# Patient Record
Sex: MALE | Race: WHITE | NOT HISPANIC OR LATINO | Employment: OTHER | URBAN - METROPOLITAN AREA
[De-identification: names, ages, dates, MRNs, and addresses within clinical notes are randomized per-mention and may not be internally consistent; named-entity substitution may affect disease eponyms.]

---

## 2017-02-11 ENCOUNTER — RESULTS ENCOUNTER (OUTPATIENT)
Dept: FAMILY MEDICINE CLINIC | Facility: CLINIC | Age: 82
End: 2017-02-11

## 2017-02-11 DIAGNOSIS — I10 ESSENTIAL HYPERTENSION: ICD-10-CM

## 2017-02-11 DIAGNOSIS — E78.5 HYPERLIPIDEMIA: ICD-10-CM

## 2017-02-11 DIAGNOSIS — R79.89 ELEVATED TSH: ICD-10-CM

## 2017-03-14 LAB
ALBUMIN SERPL-MCNC: 4 G/DL (ref 3.5–5.2)
ALBUMIN/GLOB SERPL: 1.5 G/DL
ALP SERPL-CCNC: 58 U/L (ref 39–117)
ALT SERPL-CCNC: 17 U/L (ref 1–41)
AST SERPL-CCNC: 16 U/L (ref 1–40)
BASOPHILS # BLD AUTO: 0.08 10*3/MM3 (ref 0–0.2)
BASOPHILS NFR BLD AUTO: 1.1 % (ref 0–1.5)
BILIRUB SERPL-MCNC: 0.7 MG/DL (ref 0.1–1.2)
BUN SERPL-MCNC: 16 MG/DL (ref 8–23)
BUN/CREAT SERPL: 15.4 (ref 7–25)
CALCIUM SERPL-MCNC: 9.8 MG/DL (ref 8.6–10.5)
CHLORIDE SERPL-SCNC: 103 MMOL/L (ref 98–107)
CHOLEST SERPL-MCNC: 175 MG/DL (ref 0–200)
CO2 SERPL-SCNC: 25.6 MMOL/L (ref 22–29)
CREAT SERPL-MCNC: 1.04 MG/DL (ref 0.76–1.27)
EOSINOPHIL # BLD AUTO: 0.48 10*3/MM3 (ref 0–0.7)
EOSINOPHIL NFR BLD AUTO: 6.7 % (ref 0.3–6.2)
ERYTHROCYTE [DISTWIDTH] IN BLOOD BY AUTOMATED COUNT: 14.1 % (ref 11.5–14.5)
GLOBULIN SER CALC-MCNC: 2.6 GM/DL
GLUCOSE SERPL-MCNC: 95 MG/DL (ref 65–99)
HCT VFR BLD AUTO: 42.7 % (ref 40.4–52.2)
HDLC SERPL-MCNC: 49 MG/DL (ref 40–60)
HGB BLD-MCNC: 13.9 G/DL (ref 13.7–17.6)
IMM GRANULOCYTES # BLD: 0.02 10*3/MM3 (ref 0–0.03)
IMM GRANULOCYTES NFR BLD: 0.3 % (ref 0–0.5)
LDLC SERPL CALC-MCNC: 92 MG/DL (ref 0–100)
LDLC/HDLC SERPL: 1.88 {RATIO}
LYMPHOCYTES # BLD AUTO: 2.08 10*3/MM3 (ref 0.9–4.8)
LYMPHOCYTES NFR BLD AUTO: 28.9 % (ref 19.6–45.3)
MCH RBC QN AUTO: 31.4 PG (ref 27–32.7)
MCHC RBC AUTO-ENTMCNC: 32.6 G/DL (ref 32.6–36.4)
MCV RBC AUTO: 96.6 FL (ref 79.8–96.2)
MONOCYTES # BLD AUTO: 0.6 10*3/MM3 (ref 0.2–1.2)
MONOCYTES NFR BLD AUTO: 8.3 % (ref 5–12)
NEUTROPHILS # BLD AUTO: 3.94 10*3/MM3 (ref 1.9–8.1)
NEUTROPHILS NFR BLD AUTO: 54.7 % (ref 42.7–76)
PLATELET # BLD AUTO: 258 10*3/MM3 (ref 140–500)
POTASSIUM SERPL-SCNC: 4.9 MMOL/L (ref 3.5–5.2)
PROT SERPL-MCNC: 6.6 G/DL (ref 6–8.5)
RBC # BLD AUTO: 4.42 10*6/MM3 (ref 4.6–6)
SODIUM SERPL-SCNC: 141 MMOL/L (ref 136–145)
TRIGL SERPL-MCNC: 170 MG/DL (ref 0–150)
TSH SERPL DL<=0.005 MIU/L-ACNC: 5.54 MIU/ML (ref 0.27–4.2)
VLDLC SERPL CALC-MCNC: 34 MG/DL (ref 5–40)
WBC # BLD AUTO: 7.2 10*3/MM3 (ref 4.5–10.7)

## 2017-03-30 ENCOUNTER — OFFICE VISIT (OUTPATIENT)
Dept: FAMILY MEDICINE CLINIC | Facility: CLINIC | Age: 82
End: 2017-03-30

## 2017-03-30 VITALS
DIASTOLIC BLOOD PRESSURE: 77 MMHG | WEIGHT: 178 LBS | HEART RATE: 59 BPM | RESPIRATION RATE: 16 BRPM | HEIGHT: 69 IN | SYSTOLIC BLOOD PRESSURE: 130 MMHG | TEMPERATURE: 98 F | BODY MASS INDEX: 26.36 KG/M2

## 2017-03-30 DIAGNOSIS — R79.89 ELEVATED TSH: ICD-10-CM

## 2017-03-30 DIAGNOSIS — I10 ESSENTIAL HYPERTENSION: Primary | ICD-10-CM

## 2017-03-30 DIAGNOSIS — E78.49 OTHER HYPERLIPIDEMIA: ICD-10-CM

## 2017-03-30 PROCEDURE — 99214 OFFICE O/P EST MOD 30 MIN: CPT | Performed by: FAMILY MEDICINE

## 2017-03-30 RX ORDER — ATORVASTATIN CALCIUM 10 MG/1
10 TABLET, FILM COATED ORAL NIGHTLY
Qty: 90 TABLET | Refills: 1 | Status: SHIPPED | OUTPATIENT
Start: 2017-03-30 | End: 2017-09-20 | Stop reason: SDUPTHER

## 2017-03-30 RX ORDER — LISINOPRIL 20 MG/1
20 TABLET ORAL DAILY
Qty: 90 TABLET | Refills: 1 | Status: SHIPPED | OUTPATIENT
Start: 2017-03-30 | End: 2017-09-20 | Stop reason: SDUPTHER

## 2017-03-30 NOTE — PROGRESS NOTES
"Chief Complaint   Patient presents with   • Hypertension   • Hyperlipidemia       Subjective   This patient presents the office to review labs.  His blood pressure is controlled on current therapy.  Lipids are stable.  He continues to have elevated thyroid-stimulating hormone.  We discussed the mechanism of this abnormal lab during today's office visit.  His previous neck pain has resolved.    Review of Systems   Constitutional: Negative for fatigue.   Cardiovascular: Negative for chest pain.       Objective   /77  Pulse 59  Temp 98 °F (36.7 °C) (Oral)   Resp 16  Ht 68.5\" (174 cm)  Wt 178 lb (80.7 kg)  BMI 26.67 kg/m2  Body mass index is 26.67 kg/(m^2).  Physical Exam   Constitutional: He is cooperative. No distress.   Eyes: Conjunctivae and lids are normal.   Neck: Carotid bruit is not present. No tracheal deviation present.   Cardiovascular: Normal rate, regular rhythm and normal heart sounds.    No murmur heard.  Pulmonary/Chest: Effort normal and breath sounds normal.   Neurological: He is alert. He is not disoriented.   Skin: Skin is warm and dry.   Psychiatric: He has a normal mood and affect. His speech is normal and behavior is normal.   Vitals reviewed.      Assessment/Plan     Problem List Items Addressed This Visit        Cardiovascular and Mediastinum    Hyperlipidemia    Relevant Medications    atorvastatin (LIPITOR) 10 MG tablet    Other Relevant Orders    Lipid Panel With LDL/HDL Ratio    Essential hypertension - Primary    Relevant Medications    lisinopril (PRINIVIL,ZESTRIL) 20 MG tablet    Other Relevant Orders    Comprehensive metabolic panel    CBC and Differential       Other    Elevated TSH    Relevant Orders    TSH    T4, Free    T3, Free          Outpatient Encounter Prescriptions as of 3/30/2017   Medication Sig Dispense Refill   • acetaminophen (TYLENOL) 500 MG tablet Take 2 tablets po every 8 hours as needed for pain not to exceed 6 tablets/24 hours 180 tablet 11   • Vitamin D, " Cholecalciferol, 1000 UNITS capsule Take  by mouth.     • atorvastatin (LIPITOR) 10 MG tablet Take 1 tablet by mouth Every Night for 180 days. 90 tablet 1   • lisinopril (PRINIVIL,ZESTRIL) 20 MG tablet Take 1 tablet by mouth Daily for 180 days. 90 tablet 1   • [DISCONTINUED] atorvastatin (LIPITOR) 10 MG tablet Take 1 tablet by mouth every night for 180 days. 90 tablet 1   • [DISCONTINUED] lisinopril (PRINIVIL,ZESTRIL) 20 MG tablet Take 1 tablet by mouth daily for 180 days. 90 tablet 1     No facility-administered encounter medications on file as of 3/30/2017.        Orders Placed This Encounter   Procedures   • Comprehensive metabolic panel     Standing Status:   Future     Standing Expiration Date:   12/25/2017   • Lipid Panel With LDL/HDL Ratio     Standing Status:   Future     Standing Expiration Date:   12/25/2017   • TSH     Standing Status:   Future     Standing Expiration Date:   12/25/2017   • T4, Free     Standing Status:   Future     Standing Expiration Date:   12/25/2017   • T3, Free     Standing Status:   Future     Standing Expiration Date:   12/25/2017       Continue with current treatment plan.         F/U in 6 months

## 2017-08-27 ENCOUNTER — RESULTS ENCOUNTER (OUTPATIENT)
Dept: FAMILY MEDICINE CLINIC | Facility: CLINIC | Age: 82
End: 2017-08-27

## 2017-08-27 DIAGNOSIS — I10 ESSENTIAL HYPERTENSION: ICD-10-CM

## 2017-08-27 DIAGNOSIS — E78.49 OTHER HYPERLIPIDEMIA: ICD-10-CM

## 2017-08-27 DIAGNOSIS — R79.89 ELEVATED TSH: ICD-10-CM

## 2017-09-14 LAB
ALBUMIN SERPL-MCNC: 4.3 G/DL (ref 3.5–5.2)
ALBUMIN/GLOB SERPL: 1.6 G/DL
ALP SERPL-CCNC: 61 U/L (ref 39–117)
ALT SERPL-CCNC: 18 U/L (ref 1–41)
AST SERPL-CCNC: 20 U/L (ref 1–40)
BASOPHILS # BLD AUTO: 0.1 10*3/MM3 (ref 0–0.2)
BASOPHILS NFR BLD AUTO: 1.4 % (ref 0–1.5)
BILIRUB SERPL-MCNC: 0.8 MG/DL (ref 0.1–1.2)
BUN SERPL-MCNC: 22 MG/DL (ref 8–23)
BUN/CREAT SERPL: 17.3 (ref 7–25)
CALCIUM SERPL-MCNC: 10 MG/DL (ref 8.6–10.5)
CHLORIDE SERPL-SCNC: 101 MMOL/L (ref 98–107)
CHOLEST SERPL-MCNC: 164 MG/DL (ref 0–200)
CO2 SERPL-SCNC: 25.3 MMOL/L (ref 22–29)
CREAT SERPL-MCNC: 1.27 MG/DL (ref 0.76–1.27)
EOSINOPHIL # BLD AUTO: 0.53 10*3/MM3 (ref 0–0.7)
EOSINOPHIL NFR BLD AUTO: 7.5 % (ref 0.3–6.2)
ERYTHROCYTE [DISTWIDTH] IN BLOOD BY AUTOMATED COUNT: 14.4 % (ref 11.5–14.5)
GLOBULIN SER CALC-MCNC: 2.7 GM/DL
GLUCOSE SERPL-MCNC: 96 MG/DL (ref 65–99)
HCT VFR BLD AUTO: 42.9 % (ref 40.4–52.2)
HDLC SERPL-MCNC: 55 MG/DL (ref 40–60)
HGB BLD-MCNC: 14.2 G/DL (ref 13.7–17.6)
IMM GRANULOCYTES # BLD: 0 10*3/MM3 (ref 0–0.03)
IMM GRANULOCYTES NFR BLD: 0 % (ref 0–0.5)
LDLC SERPL CALC-MCNC: 84 MG/DL (ref 0–100)
LDLC/HDLC SERPL: 1.53 {RATIO}
LYMPHOCYTES # BLD AUTO: 1.96 10*3/MM3 (ref 0.9–4.8)
LYMPHOCYTES NFR BLD AUTO: 27.9 % (ref 19.6–45.3)
MCH RBC QN AUTO: 32.4 PG (ref 27–32.7)
MCHC RBC AUTO-ENTMCNC: 33.1 G/DL (ref 32.6–36.4)
MCV RBC AUTO: 97.9 FL (ref 79.8–96.2)
MONOCYTES # BLD AUTO: 0.63 10*3/MM3 (ref 0.2–1.2)
MONOCYTES NFR BLD AUTO: 9 % (ref 5–12)
NEUTROPHILS # BLD AUTO: 3.8 10*3/MM3 (ref 1.9–8.1)
NEUTROPHILS NFR BLD AUTO: 54.2 % (ref 42.7–76)
PLATELET # BLD AUTO: 291 10*3/MM3 (ref 140–500)
POTASSIUM SERPL-SCNC: 4.9 MMOL/L (ref 3.5–5.2)
PROT SERPL-MCNC: 7 G/DL (ref 6–8.5)
RBC # BLD AUTO: 4.38 10*6/MM3 (ref 4.6–6)
SODIUM SERPL-SCNC: 141 MMOL/L (ref 136–145)
T3FREE SERPL-MCNC: 2.7 PG/ML (ref 2–4.4)
T4 FREE SERPL-MCNC: 0.94 NG/DL (ref 0.93–1.7)
TRIGL SERPL-MCNC: 123 MG/DL (ref 0–150)
TSH SERPL DL<=0.005 MIU/L-ACNC: 8.06 MIU/ML (ref 0.27–4.2)
VLDLC SERPL CALC-MCNC: 24.6 MG/DL (ref 5–40)
WBC # BLD AUTO: 7.02 10*3/MM3 (ref 4.5–10.7)

## 2017-09-20 ENCOUNTER — OFFICE VISIT (OUTPATIENT)
Dept: FAMILY MEDICINE CLINIC | Facility: CLINIC | Age: 82
End: 2017-09-20

## 2017-09-20 VITALS
HEIGHT: 69 IN | TEMPERATURE: 97.4 F | WEIGHT: 174 LBS | SYSTOLIC BLOOD PRESSURE: 124 MMHG | HEART RATE: 64 BPM | RESPIRATION RATE: 16 BRPM | DIASTOLIC BLOOD PRESSURE: 70 MMHG | BODY MASS INDEX: 25.77 KG/M2

## 2017-09-20 DIAGNOSIS — R79.89 ELEVATED TSH: ICD-10-CM

## 2017-09-20 DIAGNOSIS — I10 ESSENTIAL HYPERTENSION: Primary | ICD-10-CM

## 2017-09-20 DIAGNOSIS — E78.49 OTHER HYPERLIPIDEMIA: ICD-10-CM

## 2017-09-20 PROCEDURE — 99214 OFFICE O/P EST MOD 30 MIN: CPT | Performed by: FAMILY MEDICINE

## 2017-09-20 RX ORDER — ATORVASTATIN CALCIUM 10 MG/1
10 TABLET, FILM COATED ORAL NIGHTLY
Qty: 90 TABLET | Refills: 1 | Status: SHIPPED | OUTPATIENT
Start: 2017-09-20 | End: 2018-04-02 | Stop reason: SDUPTHER

## 2017-09-20 RX ORDER — LISINOPRIL 20 MG/1
20 TABLET ORAL DAILY
Qty: 90 TABLET | Refills: 1 | Status: SHIPPED | OUTPATIENT
Start: 2017-09-20 | End: 2018-04-02 | Stop reason: SDUPTHER

## 2017-09-20 NOTE — PROGRESS NOTES
"Chief Complaint   Patient presents with   • Hypertension   • Hyperlipidemia       Subjective   This patient presents the office to review labs and refill medications.  Blood pressure is to goal on current therapy.  Lipids are stable.  He continues to have mildly progressive elevation of TSH.  His thyroid hormones are within normal limits.  He has no symptoms other than mild swelling of the ankles due to his thyroid.  I have reviewed and updated his medications, medical history and problem list during today's office visit.        Social History   Substance Use Topics   • Smoking status: Former Smoker   • Smokeless tobacco: Not on file   • Alcohol use Yes       Review of Systems    Objective   /70  Pulse 64  Temp 97.4 °F (36.3 °C) (Oral)   Resp 16  Ht 68.5\" (174 cm)  Wt 174 lb (78.9 kg)  BMI 26.07 kg/m2  Body mass index is 26.07 kg/(m^2).  Physical Exam    Data Reviewed:    CMP:  Lab Results   Component Value Date    GLU 96 09/13/2017    BUN 22 09/13/2017    CREATININE 1.27 09/13/2017    EGFRIFNONA 54 (L) 09/13/2017    EGFRIFAFRI 66 09/13/2017     09/13/2017    K 4.9 09/13/2017     09/13/2017    CALCIUM 10.0 09/13/2017    PROTENTOTREF 7.0 09/13/2017    ALBUMIN 4.30 09/13/2017    LABGLOBREF 2.7 09/13/2017    BILITOT 0.8 09/13/2017    ALKPHOS 61 09/13/2017    AST 20 09/13/2017    ALT 18 09/13/2017     CBC w/ diff:   Lab Results   Component Value Date    WBC 7.02 09/13/2017    RBC 4.38 (L) 09/13/2017    HGB 14.2 09/13/2017    HCT 42.9 09/13/2017    MCV 97.9 (H) 09/13/2017    MCH 32.4 09/13/2017    MCHC 33.1 09/13/2017    RDW 14.4 09/13/2017     09/13/2017    NEUTRORELPCT 54.2 09/13/2017    LYMPHORELPCT 27.9 09/13/2017    MONORELPCT 9.0 09/13/2017    EOSRELPCT 7.5 (H) 09/13/2017    BASORELPCT 1.4 09/13/2017     LIPID PANEL:  Lab Results   Component Value Date    CHLPL 164 09/13/2017    TRIG 123 09/13/2017    HDL 55 09/13/2017    VLDL 24.6 09/13/2017    LDL 84 09/13/2017    LDLHDL 1.53 " 09/13/2017     TSH:  Lab Results   Component Value Date    TSH 8.060 (H) 09/13/2017       Assessment/Plan     Problem List Items Addressed This Visit        Cardiovascular and Mediastinum    Hyperlipidemia    Relevant Medications    atorvastatin (LIPITOR) 10 MG tablet    Other Relevant Orders    Lipid Panel With LDL/HDL Ratio    Essential hypertension - Primary    Relevant Medications    lisinopril (PRINIVIL,ZESTRIL) 20 MG tablet    Other Relevant Orders    Comprehensive metabolic panel    CBC and Differential       Other    Elevated TSH    Relevant Orders    TSH          Outpatient Encounter Prescriptions as of 9/20/2017   Medication Sig Dispense Refill   • atorvastatin (LIPITOR) 10 MG tablet Take 1 tablet by mouth Every Night for 180 days. 90 tablet 1   • lisinopril (PRINIVIL,ZESTRIL) 20 MG tablet Take 1 tablet by mouth Daily for 180 days. 90 tablet 1   • Vitamin D, Cholecalciferol, 1000 UNITS capsule Take  by mouth.     • [DISCONTINUED] atorvastatin (LIPITOR) 10 MG tablet Take 1 tablet by mouth Every Night for 180 days. 90 tablet 1   • [DISCONTINUED] lisinopril (PRINIVIL,ZESTRIL) 20 MG tablet Take 1 tablet by mouth Daily for 180 days. 90 tablet 1     No facility-administered encounter medications on file as of 9/20/2017.        Orders Placed This Encounter   Procedures   • Comprehensive metabolic panel     Standing Status:   Future     Standing Expiration Date:   6/17/2018   • Lipid Panel With LDL/HDL Ratio     Standing Status:   Future     Standing Expiration Date:   6/17/2018   • TSH     Standing Status:   Future     Standing Expiration Date:   6/17/2018   • CBC and Differential     Standing Status:   Future     Standing Expiration Date:   6/17/2018     Order Specific Question:   Manual Differential     Answer:   No       Continue with current treatment plan.         F/U in 6 months

## 2018-02-17 ENCOUNTER — RESULTS ENCOUNTER (OUTPATIENT)
Dept: FAMILY MEDICINE CLINIC | Facility: CLINIC | Age: 83
End: 2018-02-17

## 2018-02-17 DIAGNOSIS — R79.89 ELEVATED TSH: ICD-10-CM

## 2018-02-17 DIAGNOSIS — E78.49 OTHER HYPERLIPIDEMIA: ICD-10-CM

## 2018-02-17 DIAGNOSIS — I10 ESSENTIAL HYPERTENSION: ICD-10-CM

## 2018-03-27 LAB
ALBUMIN SERPL-MCNC: 4.3 G/DL (ref 3.5–5.2)
ALBUMIN/GLOB SERPL: 1.5 G/DL
ALP SERPL-CCNC: 61 U/L (ref 39–117)
ALT SERPL-CCNC: 13 U/L (ref 1–41)
AST SERPL-CCNC: 13 U/L (ref 1–40)
BASOPHILS # BLD AUTO: 0.09 10*3/MM3 (ref 0–0.2)
BASOPHILS NFR BLD AUTO: 1.2 % (ref 0–1.5)
BILIRUB SERPL-MCNC: 0.9 MG/DL (ref 0.1–1.2)
BUN SERPL-MCNC: 16 MG/DL (ref 8–23)
BUN/CREAT SERPL: 13.8 (ref 7–25)
CALCIUM SERPL-MCNC: 9.9 MG/DL (ref 8.6–10.5)
CHLORIDE SERPL-SCNC: 104 MMOL/L (ref 98–107)
CHOLEST SERPL-MCNC: 165 MG/DL (ref 0–200)
CO2 SERPL-SCNC: 26.9 MMOL/L (ref 22–29)
CREAT SERPL-MCNC: 1.16 MG/DL (ref 0.76–1.27)
EOSINOPHIL # BLD AUTO: 0.55 10*3/MM3 (ref 0–0.7)
EOSINOPHIL NFR BLD AUTO: 7.2 % (ref 0.3–6.2)
ERYTHROCYTE [DISTWIDTH] IN BLOOD BY AUTOMATED COUNT: 14.3 % (ref 11.5–14.5)
GFR SERPLBLD CREATININE-BSD FMLA CKD-EPI: 60 ML/MIN/1.73
GFR SERPLBLD CREATININE-BSD FMLA CKD-EPI: 73 ML/MIN/1.73
GLOBULIN SER CALC-MCNC: 2.8 GM/DL
GLUCOSE SERPL-MCNC: 100 MG/DL (ref 65–99)
HCT VFR BLD AUTO: 44.9 % (ref 40.4–52.2)
HDLC SERPL-MCNC: 51 MG/DL (ref 40–60)
HGB BLD-MCNC: 14.2 G/DL (ref 13.7–17.6)
IMM GRANULOCYTES # BLD: 0.02 10*3/MM3 (ref 0–0.03)
IMM GRANULOCYTES NFR BLD: 0.3 % (ref 0–0.5)
LDLC SERPL CALC-MCNC: 89 MG/DL (ref 0–100)
LDLC/HDLC SERPL: 1.74 {RATIO}
LYMPHOCYTES # BLD AUTO: 2.48 10*3/MM3 (ref 0.9–4.8)
LYMPHOCYTES NFR BLD AUTO: 32.3 % (ref 19.6–45.3)
MCH RBC QN AUTO: 31.4 PG (ref 27–32.7)
MCHC RBC AUTO-ENTMCNC: 31.6 G/DL (ref 32.6–36.4)
MCV RBC AUTO: 99.3 FL (ref 79.8–96.2)
MONOCYTES # BLD AUTO: 0.57 10*3/MM3 (ref 0.2–1.2)
MONOCYTES NFR BLD AUTO: 7.4 % (ref 5–12)
NEUTROPHILS # BLD AUTO: 3.97 10*3/MM3 (ref 1.9–8.1)
NEUTROPHILS NFR BLD AUTO: 51.6 % (ref 42.7–76)
PLATELET # BLD AUTO: 252 10*3/MM3 (ref 140–500)
POTASSIUM SERPL-SCNC: 5.3 MMOL/L (ref 3.5–5.2)
PROT SERPL-MCNC: 7.1 G/DL (ref 6–8.5)
RBC # BLD AUTO: 4.52 10*6/MM3 (ref 4.6–6)
SODIUM SERPL-SCNC: 142 MMOL/L (ref 136–145)
TRIGL SERPL-MCNC: 127 MG/DL (ref 0–150)
TSH SERPL DL<=0.005 MIU/L-ACNC: 6.21 MIU/ML (ref 0.27–4.2)
VLDLC SERPL CALC-MCNC: 25.4 MG/DL (ref 5–40)
WBC # BLD AUTO: 7.68 10*3/MM3 (ref 4.5–10.7)

## 2018-04-02 ENCOUNTER — OFFICE VISIT (OUTPATIENT)
Dept: FAMILY MEDICINE CLINIC | Facility: CLINIC | Age: 83
End: 2018-04-02

## 2018-04-02 VITALS
BODY MASS INDEX: 28.61 KG/M2 | HEIGHT: 66 IN | WEIGHT: 178 LBS | SYSTOLIC BLOOD PRESSURE: 136 MMHG | HEART RATE: 61 BPM | TEMPERATURE: 97.4 F | DIASTOLIC BLOOD PRESSURE: 72 MMHG | RESPIRATION RATE: 16 BRPM

## 2018-04-02 DIAGNOSIS — R35.1 NOCTURIA: ICD-10-CM

## 2018-04-02 DIAGNOSIS — E78.49 OTHER HYPERLIPIDEMIA: ICD-10-CM

## 2018-04-02 DIAGNOSIS — R79.89 ELEVATED TSH: ICD-10-CM

## 2018-04-02 DIAGNOSIS — I10 ESSENTIAL HYPERTENSION: Primary | ICD-10-CM

## 2018-04-02 PROBLEM — Z85.038 HISTORY OF COLON CANCER: Status: ACTIVE | Noted: 2018-04-02

## 2018-04-02 PROCEDURE — 99214 OFFICE O/P EST MOD 30 MIN: CPT | Performed by: FAMILY MEDICINE

## 2018-04-02 RX ORDER — LISINOPRIL 20 MG/1
20 TABLET ORAL DAILY
Qty: 90 TABLET | Refills: 1 | Status: SHIPPED | OUTPATIENT
Start: 2018-04-02 | End: 2018-09-27 | Stop reason: SDUPTHER

## 2018-04-02 RX ORDER — ATORVASTATIN CALCIUM 10 MG/1
10 TABLET, FILM COATED ORAL NIGHTLY
Qty: 90 TABLET | Refills: 1 | Status: SHIPPED | OUTPATIENT
Start: 2018-04-02 | End: 2018-09-27 | Stop reason: SDUPTHER

## 2018-04-02 NOTE — PROGRESS NOTES
"Chief Complaint   Patient presents with   • Hypertension   • Hyperlipidemia       Subjective   Gab Maloney presents to the office today to refill his medications and review recent labs. No medication side effects are reported.  His blood pressure is controlled.  Lipids are controlled on current therapy.  He continues to have elevation of TSH that has shown interval improvement.    I have reviewed and updated his medications, medical history and problem list during today's office visit.     Social History   Substance Use Topics   • Smoking status: Former Smoker   • Smokeless tobacco: Never Used   • Alcohol use Yes     Review of Systems   Constitutional: Negative for fatigue.   Cardiovascular: Negative for chest pain.   Genitourinary:        Nocturia     Objective   /72   Pulse 61   Temp 97.4 °F (36.3 °C) (Oral)   Resp 16   Ht 167.1 cm (65.8\")   Wt 80.7 kg (178 lb)   BMI 28.90 kg/m²   Body mass index is 28.9 kg/m².  Physical Exam   Constitutional: He is oriented to person, place, and time. He appears well-developed. No distress.   Eyes: Conjunctivae and lids are normal.   Neck: Carotid bruit is not present.   Cardiovascular: Normal rate, regular rhythm and normal heart sounds.    Pulmonary/Chest: Effort normal and breath sounds normal.   Neurological: He is alert and oriented to person, place, and time.   Skin: Skin is warm and dry.   Psychiatric: He has a normal mood and affect. His behavior is normal.   Vitals reviewed.      Data Reviewed:         Lab Results   Component Value Date     (H) 03/27/2018    BUN 16 03/27/2018    CREATININE 1.16 03/27/2018    EGFRIFNONA 60 (L) 03/27/2018    EGFRIFAFRI 73 03/27/2018     03/27/2018    K 5.3 (H) 03/27/2018     03/27/2018    CALCIUM 9.9 03/27/2018    PROTENTOTREF 7.1 03/27/2018    ALBUMIN 4.30 03/27/2018    LABGLOBREF 2.8 03/27/2018    BILITOT 0.9 03/27/2018    ALKPHOS 61 03/27/2018    AST 13 03/27/2018    ALT 13 03/27/2018     CBC w/ " diff:   Lab Results   Component Value Date    WBC 7.68 03/27/2018    RBC 4.52 (L) 03/27/2018    HGB 14.2 03/27/2018    HCT 44.9 03/27/2018    MCV 99.3 (H) 03/27/2018    MCH 31.4 03/27/2018    MCHC 31.6 (L) 03/27/2018    RDW 14.3 03/27/2018     03/27/2018    NEUTRORELPCT 51.6 03/27/2018    LYMPHORELPCT 32.3 03/27/2018    MONORELPCT 7.4 03/27/2018    EOSRELPCT 7.2 (H) 03/27/2018    BASORELPCT 1.2 03/27/2018     LIPID PANEL:  Lab Results   Component Value Date    CHLPL 165 03/27/2018    TRIG 127 03/27/2018    HDL 51 03/27/2018    VLDL 25.4 03/27/2018    LDL 89 03/27/2018    LDLHDL 1.74 03/27/2018     TSH:  Lab Results   Component Value Date    TSH 6.210 (H) 03/27/2018       Assessment/Plan   Problem List Items Addressed This Visit        Cardiovascular and Mediastinum    Other hyperlipidemia    Relevant Medications    atorvastatin (LIPITOR) 10 MG tablet    Other Relevant Orders    Lipid panel    Essential hypertension - Primary    Relevant Medications    lisinopril (PRINIVIL,ZESTRIL) 20 MG tablet    Other Relevant Orders    Comprehensive metabolic panel    CBC and Differential       Genitourinary    Nocturia    Relevant Orders    PSA       Other    Elevated TSH    Relevant Orders    TSH      Other Visit Diagnoses    None.       F/U in 6 months       Orders Placed This Encounter   Procedures   • Comprehensive metabolic panel     Standing Status:   Future     Standing Expiration Date:   12/28/2018   • Lipid panel     Standing Status:   Future     Standing Expiration Date:   12/28/2018   • TSH     Standing Status:   Future     Standing Expiration Date:   12/28/2018   • PSA     Standing Status:   Future     Standing Expiration Date:   12/28/2018   • CBC and Differential     Standing Status:   Future     Standing Expiration Date:   12/28/2018     Order Specific Question:   Manual Differential     Answer:   No

## 2018-08-15 ENCOUNTER — PREP FOR SURGERY (OUTPATIENT)
Dept: OTHER | Facility: HOSPITAL | Age: 83
End: 2018-08-15

## 2018-08-15 DIAGNOSIS — Z86.010 HISTORY OF COLON POLYPS: Primary | ICD-10-CM

## 2018-08-15 PROBLEM — Z86.0100 HISTORY OF COLON POLYPS: Status: ACTIVE | Noted: 2018-08-15

## 2018-08-30 ENCOUNTER — RESULTS ENCOUNTER (OUTPATIENT)
Dept: FAMILY MEDICINE CLINIC | Facility: CLINIC | Age: 83
End: 2018-08-30

## 2018-08-30 DIAGNOSIS — R79.89 ELEVATED TSH: ICD-10-CM

## 2018-08-30 DIAGNOSIS — I10 ESSENTIAL HYPERTENSION: ICD-10-CM

## 2018-08-30 DIAGNOSIS — R35.1 NOCTURIA: ICD-10-CM

## 2018-08-30 DIAGNOSIS — E78.49 OTHER HYPERLIPIDEMIA: ICD-10-CM

## 2018-09-18 LAB
ALBUMIN SERPL-MCNC: 4.3 G/DL (ref 3.5–5.2)
ALBUMIN/GLOB SERPL: 1.7 G/DL
ALP SERPL-CCNC: 55 U/L (ref 39–117)
ALT SERPL-CCNC: 19 U/L (ref 1–41)
AST SERPL-CCNC: 19 U/L (ref 1–40)
BASOPHILS # BLD AUTO: 0.08 10*3/MM3 (ref 0–0.2)
BASOPHILS NFR BLD AUTO: 1.1 % (ref 0–1.5)
BILIRUB SERPL-MCNC: 0.8 MG/DL (ref 0.1–1.2)
BUN SERPL-MCNC: 19 MG/DL (ref 8–23)
BUN/CREAT SERPL: 17.3 (ref 7–25)
CALCIUM SERPL-MCNC: 9.6 MG/DL (ref 8.6–10.5)
CHLORIDE SERPL-SCNC: 103 MMOL/L (ref 98–107)
CHOLEST SERPL-MCNC: 157 MG/DL (ref 0–200)
CO2 SERPL-SCNC: 25.2 MMOL/L (ref 22–29)
CREAT SERPL-MCNC: 1.1 MG/DL (ref 0.76–1.27)
EOSINOPHIL # BLD AUTO: 0.63 10*3/MM3 (ref 0–0.7)
EOSINOPHIL NFR BLD AUTO: 9 % (ref 0.3–6.2)
ERYTHROCYTE [DISTWIDTH] IN BLOOD BY AUTOMATED COUNT: 14.4 % (ref 11.5–14.5)
GLOBULIN SER CALC-MCNC: 2.6 GM/DL
GLUCOSE SERPL-MCNC: 90 MG/DL (ref 65–99)
HCT VFR BLD AUTO: 42.3 % (ref 40.4–52.2)
HDLC SERPL-MCNC: 49 MG/DL (ref 40–60)
HGB BLD-MCNC: 14 G/DL (ref 13.7–17.6)
IMM GRANULOCYTES # BLD: 0.02 10*3/MM3 (ref 0–0.03)
IMM GRANULOCYTES NFR BLD: 0.3 % (ref 0–0.5)
LDLC SERPL CALC-MCNC: 84 MG/DL (ref 0–100)
LYMPHOCYTES # BLD AUTO: 2.26 10*3/MM3 (ref 0.9–4.8)
LYMPHOCYTES NFR BLD AUTO: 32.1 % (ref 19.6–45.3)
MCH RBC QN AUTO: 31.7 PG (ref 27–32.7)
MCHC RBC AUTO-ENTMCNC: 33.1 G/DL (ref 32.6–36.4)
MCV RBC AUTO: 95.9 FL (ref 79.8–96.2)
MONOCYTES # BLD AUTO: 0.5 10*3/MM3 (ref 0.2–1.2)
MONOCYTES NFR BLD AUTO: 7.1 % (ref 5–12)
NEUTROPHILS # BLD AUTO: 3.56 10*3/MM3 (ref 1.9–8.1)
NEUTROPHILS NFR BLD AUTO: 50.7 % (ref 42.7–76)
PLATELET # BLD AUTO: 255 10*3/MM3 (ref 140–500)
POTASSIUM SERPL-SCNC: 4.8 MMOL/L (ref 3.5–5.2)
PROT SERPL-MCNC: 6.9 G/DL (ref 6–8.5)
PSA SERPL-MCNC: 1.05 NG/ML (ref 0–4)
RBC # BLD AUTO: 4.41 10*6/MM3 (ref 4.6–6)
SODIUM SERPL-SCNC: 140 MMOL/L (ref 136–145)
TRIGL SERPL-MCNC: 120 MG/DL (ref 0–150)
TSH SERPL DL<=0.005 MIU/L-ACNC: 5.07 MIU/ML (ref 0.27–4.2)
VLDLC SERPL CALC-MCNC: 24 MG/DL (ref 5–40)
WBC # BLD AUTO: 7.03 10*3/MM3 (ref 4.5–10.7)

## 2018-09-27 ENCOUNTER — OFFICE VISIT (OUTPATIENT)
Dept: FAMILY MEDICINE CLINIC | Facility: CLINIC | Age: 83
End: 2018-09-27

## 2018-09-27 VITALS
HEIGHT: 66 IN | HEART RATE: 50 BPM | TEMPERATURE: 97.2 F | WEIGHT: 171 LBS | RESPIRATION RATE: 16 BRPM | SYSTOLIC BLOOD PRESSURE: 130 MMHG | DIASTOLIC BLOOD PRESSURE: 68 MMHG | BODY MASS INDEX: 27.48 KG/M2

## 2018-09-27 DIAGNOSIS — E78.49 OTHER HYPERLIPIDEMIA: ICD-10-CM

## 2018-09-27 DIAGNOSIS — R35.1 NOCTURIA: ICD-10-CM

## 2018-09-27 DIAGNOSIS — I10 ESSENTIAL HYPERTENSION: Primary | ICD-10-CM

## 2018-09-27 DIAGNOSIS — R79.89 ELEVATED TSH: ICD-10-CM

## 2018-09-27 PROCEDURE — 99214 OFFICE O/P EST MOD 30 MIN: CPT | Performed by: FAMILY MEDICINE

## 2018-09-27 RX ORDER — LISINOPRIL 20 MG/1
20 TABLET ORAL DAILY
Qty: 90 TABLET | Refills: 1 | Status: SHIPPED | OUTPATIENT
Start: 2018-09-27 | End: 2019-04-03 | Stop reason: SDUPTHER

## 2018-09-27 RX ORDER — ATORVASTATIN CALCIUM 10 MG/1
10 TABLET, FILM COATED ORAL NIGHTLY
Qty: 90 TABLET | Refills: 1 | Status: SHIPPED | OUTPATIENT
Start: 2018-09-27 | End: 2019-04-03 | Stop reason: SDUPTHER

## 2018-09-27 NOTE — PROGRESS NOTES
"Chief Complaint   Patient presents with   • Hypertension   • Hyperlipidemia       Subjective     Gab Maloney presents to the office today to refill his medications and review recent labs. No medication side effects are reported.  His blood pressure is controlled on current therapy.  Lipids are stable with current medications.  Elevated TSH is showing some improvement since last visit.  PSA is within normal limits.    I have reviewed and updated his medications, medical history and problem list during today's office visit.      Patient Care Team:  Franck Moreno MD as PCP - General  Franck Moreno MD as PCP - Family Medicine    Social History   Substance Use Topics   • Smoking status: Former Smoker   • Smokeless tobacco: Never Used   • Alcohol use Yes       Review of Systems   Constitutional: Negative for fatigue.   Cardiovascular: Negative for chest pain.       Objective     /68   Pulse 50   Temp 97.2 °F (36.2 °C) (Oral)   Resp 16   Ht 167.1 cm (65.8\")   Wt 77.6 kg (171 lb)   BMI 27.77 kg/m²     Body mass index is 27.77 kg/m².    Physical Exam   Constitutional: He is oriented to person, place, and time. He appears well-developed. No distress.   Eyes: Conjunctivae and lids are normal.   Neck: Carotid bruit is not present.   Cardiovascular: Normal rate, regular rhythm and normal heart sounds.    Pulmonary/Chest: Effort normal and breath sounds normal.   Neurological: He is alert and oriented to person, place, and time.   Skin: Skin is warm and dry.   Psychiatric: He has a normal mood and affect. His behavior is normal.   Vitals reviewed.      Data Reviewed:         CMP:  Lab Results   Component Value Date    GLU 90 09/18/2018    BUN 19 09/18/2018    CREATININE 1.10 09/18/2018    EGFRIFNONA 64 09/18/2018    EGFRIFAFRI 78 09/18/2018     09/18/2018    K 4.8 09/18/2018     09/18/2018    CALCIUM 9.6 09/18/2018    PROTENTOTREF 6.9 09/18/2018    ALBUMIN 4.30 09/18/2018    LABGLOBREF 2.6 " 09/18/2018    BILITOT 0.8 09/18/2018    ALKPHOS 55 09/18/2018    AST 19 09/18/2018    ALT 19 09/18/2018     CBC w/ diff:   Lab Results   Component Value Date    WBC 7.68 03/27/2018    RBC 4.41 (L) 09/18/2018    HGB 14.0 09/18/2018    HCT 42.3 09/18/2018    MCV 95.9 09/18/2018    MCH 31.7 09/18/2018    MCHC 33.1 09/18/2018    RDW 14.4 09/18/2018     09/18/2018    NEUTRORELPCT 50.7 09/18/2018    LYMPHORELPCT 32.1 09/18/2018    MONORELPCT 7.1 09/18/2018    EOSRELPCT 9.0 (H) 09/18/2018    BASORELPCT 1.1 09/18/2018     LIPID PANEL:  Lab Results   Component Value Date    CHLPL 157 09/18/2018    TRIG 120 09/18/2018    HDL 49 09/18/2018    VLDL 24 09/18/2018    LDL 84 09/18/2018    LDLHDL 1.74 03/27/2018     TSH:  Lab Results   Component Value Date    TSH 5.070 (H) 09/18/2018     PSA:  Lab Results   Component Value Date    PSA 1.050 09/18/2018       Assessment/Plan     Problem List Items Addressed This Visit     Other hyperlipidemia     Lipid abnormalities are unchanged.  Pharmacotherapy as ordered.  Lipids will be reassessed in 6 months.         Relevant Medications    atorvastatin (LIPITOR) 10 MG tablet    Other Relevant Orders    Lipid Panel With LDL/HDL Ratio    Essential hypertension - Primary     Hypertension is unchanged.  Continue current treatment regimen.  Blood pressure will be reassessed at the next regular appointment.         Relevant Medications    lisinopril (PRINIVIL,ZESTRIL) 20 MG tablet    Other Relevant Orders    Comprehensive metabolic panel    CBC and Differential    Elevated TSH     stable         Relevant Orders    TSH    Nocturia     stable               Orders Placed This Encounter   Procedures   • Comprehensive metabolic panel     Standing Status:   Future     Standing Expiration Date:   6/24/2019   • Lipid Panel With LDL/HDL Ratio     Standing Status:   Future     Standing Expiration Date:   6/24/2019   • TSH     Standing Status:   Future     Standing Expiration Date:   6/24/2019   • CBC  and Differential     Standing Status:   Future     Standing Expiration Date:   6/24/2019     Order Specific Question:   Manual Differential     Answer:   No         Current Outpatient Prescriptions:   •  atorvastatin (LIPITOR) 10 MG tablet, Take 1 tablet by mouth Every Night for 180 days., Disp: 90 tablet, Rfl: 1  •  lisinopril (PRINIVIL,ZESTRIL) 20 MG tablet, Take 1 tablet by mouth Daily for 180 days., Disp: 90 tablet, Rfl: 1  •  Vitamin D, Cholecalciferol, 1000 UNITS capsule, Take  by mouth., Disp: , Rfl:     Return in about 6 months (around 3/27/2019) for Medicare Wellness and regular visit, 30 minutes.

## 2018-09-27 NOTE — PATIENT INSTRUCTIONS
Check on insurance coverage and cost for adacel Tdap(tetanus plus whooping cough vaccine) and get the immunization at your local pharmacy  Check on insurance coverage and cost for Shingrix (newest shingles vaccine) and get the immunization at your local pharmacy. It is more effective than the old Zostavax vaccine and is recommended even if you have had the Zostavax vaccine in the past. For more information, please look at the website below:    https://www.cdc.gov/vaccines/vpd/shingles/public/shingrix/index.html    Annual flu shot has been recommended to patient. Optimal timing of this vaccination is in mid October of each year.       Calorie Counting for Weight Loss  Calories are units of energy. Your body needs a certain amount of calories from food to keep you going throughout the day. When you eat more calories than your body needs, your body stores the extra calories as fat. When you eat fewer calories than your body needs, your body burns fat to get the energy it needs.  Calorie counting means keeping track of how many calories you eat and drink each day. Calorie counting can be helpful if you need to lose weight. If you make sure to eat fewer calories than your body needs, you should lose weight. Ask your health care provider what a healthy weight is for you.  For calorie counting to work, you will need to eat the right number of calories in a day in order to lose a healthy amount of weight per week. A dietitian can help you determine how many calories you need in a day and will give you suggestions on how to reach your calorie goal.  · A healthy amount of weight to lose per week is usually 1-2 lb (0.5-0.9 kg). This usually means that your daily calorie intake should be reduced by 500-750 calories.  · Eating 1,200 - 1,500 calories per day can help most women lose weight.  · Eating 1,500 - 1,800 calories per day can help most men lose weight.    What do I need to know about calorie counting?  In order to meet  your daily calorie goal, you will need to:  · Find out how many calories are in each food you would like to eat. Try to do this before you eat.  · Decide how much of the food you plan to eat.  · Write down what you ate and how many calories it had. Doing this is called keeping a food log.    To successfully lose weight, it is important to balance calorie counting with a healthy lifestyle that includes regular activity. Aim for 150 minutes of moderate exercise (such as walking) or 75 minutes of vigorous exercise (such as running) each week.  Where do I find calorie information?    The number of calories in a food can be found on a Nutrition Facts label. If a food does not have a Nutrition Facts label, try to look up the calories online or ask your dietitian for help.  Remember that calories are listed per serving. If you choose to have more than one serving of a food, you will have to multiply the calories per serving by the amount of servings you plan to eat. For example, the label on a package of bread might say that a serving size is 1 slice and that there are 90 calories in a serving. If you eat 1 slice, you will have eaten 90 calories. If you eat 2 slices, you will have eaten 180 calories.  How do I keep a food log?  Immediately after each meal, record the following information in your food log:  · What you ate. Don't forget to include toppings, sauces, and other extras on the food.  · How much you ate. This can be measured in cups, ounces, or number of items.  · How many calories each food and drink had.  · The total number of calories in the meal.    Keep your food log near you, such as in a small notebook in your pocket, or use a mobile kim or website. Some programs will calculate calories for you and show you how many calories you have left for the day to meet your goal.  What are some calorie counting tips?  · Use your calories on foods and drinks that will fill you up and not leave you hungry:  ? Some  "examples of foods that fill you up are nuts and nut butters, vegetables, lean proteins, and high-fiber foods like whole grains. High-fiber foods are foods with more than 5 g fiber per serving.  ? Drinks such as sodas, specialty coffee drinks, alcohol, and juices have a lot of calories, yet do not fill you up.  · Eat nutritious foods and avoid empty calories. Empty calories are calories you get from foods or beverages that do not have many vitamins or protein, such as candy, sweets, and soda. It is better to have a nutritious high-calorie food (such as an avocado) than a food with few nutrients (such as a bag of chips).  · Know how many calories are in the foods you eat most often. This will help you calculate calorie counts faster.  · Pay attention to calories in drinks. Low-calorie drinks include water and unsweetened drinks.  · Pay attention to nutrition labels for \"low fat\" or \"fat free\" foods. These foods sometimes have the same amount of calories or more calories than the full fat versions. They also often have added sugar, starch, or salt, to make up for flavor that was removed with the fat.  · Find a way of tracking calories that works for you. Get creative. Try different apps or programs if writing down calories does not work for you.  What are some portion control tips?  · Know how many calories are in a serving. This will help you know how many servings of a certain food you can have.  · Use a measuring cup to measure serving sizes. You could also try weighing out portions on a kitchen scale. With time, you will be able to estimate serving sizes for some foods.  · Take some time to put servings of different foods on your favorite plates, bowls, and cups so you know what a serving looks like.  · Try not to eat straight from a bag or box. Doing this can lead to overeating. Put the amount you would like to eat in a cup or on a plate to make sure you are eating the right portion.  · Use smaller plates, " glasses, and bowls to prevent overeating.  · Try not to multitask (for example, watch TV or use your computer) while eating. If it is time to eat, sit down at a table and enjoy your food. This will help you to know when you are full. It will also help you to be aware of what you are eating and how much you are eating.  What are tips for following this plan?  Reading food labels  · Check the calorie count compared to the serving size. The serving size may be smaller than what you are used to eating.  · Check the source of the calories. Make sure the food you are eating is high in vitamins and protein and low in saturated and trans fats.  Shopping  · Read nutrition labels while you shop. This will help you make healthy decisions before you decide to purchase your food.  · Make a grocery list and stick to it.  Cooking  · Try to cook your favorite foods in a healthier way. For example, try baking instead of frying.  · Use low-fat dairy products.  Meal planning  · Use more fruits and vegetables. Half of your plate should be fruits and vegetables.  · Include lean proteins like poultry and fish.  How do I count calories when eating out?  · Ask for smaller portion sizes.  · Consider sharing an entree and sides instead of getting your own entree.  · If you get your own entree, eat only half. Ask for a box at the beginning of your meal and put the rest of your entree in it so you are not tempted to eat it.  · If calories are listed on the menu, choose the lower calorie options.  · Choose dishes that include vegetables, fruits, whole grains, low-fat dairy products, and lean protein.  · Choose items that are boiled, broiled, grilled, or steamed. Stay away from items that are buttered, battered, fried, or served with cream sauce. Items labeled “crispy” are usually fried, unless stated otherwise.  · Choose water, low-fat milk, unsweetened iced tea, or other drinks without added sugar. If you want an alcoholic beverage, choose a  lower calorie option such as a glass of wine or light beer.  · Ask for dressings, sauces, and syrups on the side. These are usually high in calories, so you should limit the amount you eat.  · If you want a salad, choose a garden salad and ask for grilled meats. Avoid extra toppings like moore, cheese, or fried items. Ask for the dressing on the side, or ask for olive oil and vinegar or lemon to use as dressing.  · Estimate how many servings of a food you are given. For example, a serving of cooked rice is ½ cup or about the size of half a baseball. Knowing serving sizes will help you be aware of how much food you are eating at restaurants. The list below tells you how big or small some common portion sizes are based on everyday objects:  ? 1 oz--4 stacked dice.  ? 3 oz--1 deck of cards.  ? 1 tsp--1 die.  ? 1 Tbsp--½ a ping-pong ball.  ? 2 Tbsp--1 ping-pong ball.  ? ½ cup--½ baseball.  ? 1 cup--1 baseball.  Summary  · Calorie counting means keeping track of how many calories you eat and drink each day. If you eat fewer calories than your body needs, you should lose weight.  · A healthy amount of weight to lose per week is usually 1-2 lb (0.5-0.9 kg). This usually means reducing your daily calorie intake by 500-750 calories.  · The number of calories in a food can be found on a Nutrition Facts label. If a food does not have a Nutrition Facts label, try to look up the calories online or ask your dietitian for help.  · Use your calories on foods and drinks that will fill you up, and not on foods and drinks that will leave you hungry.  · Use smaller plates, glasses, and bowls to prevent overeating.  This information is not intended to replace advice given to you by your health care provider. Make sure you discuss any questions you have with your health care provider.  Document Released: 12/18/2006 Document Revised: 11/17/2017 Document Reviewed: 11/17/2017  Elsevier Interactive Patient Education © 2018 [employer]  [employer]

## 2018-11-07 ENCOUNTER — ANESTHESIA (OUTPATIENT)
Dept: GASTROENTEROLOGY | Facility: HOSPITAL | Age: 83
End: 2018-11-07

## 2018-11-07 ENCOUNTER — ANESTHESIA EVENT (OUTPATIENT)
Dept: GASTROENTEROLOGY | Facility: HOSPITAL | Age: 83
End: 2018-11-07

## 2018-11-07 ENCOUNTER — HOSPITAL ENCOUNTER (OUTPATIENT)
Facility: HOSPITAL | Age: 83
Setting detail: HOSPITAL OUTPATIENT SURGERY
Discharge: HOME | End: 2018-11-07
Attending: COLON & RECTAL SURGERY | Admitting: COLON & RECTAL SURGERY

## 2018-11-07 VITALS
RESPIRATION RATE: 18 BRPM | BODY MASS INDEX: 25.01 KG/M2 | DIASTOLIC BLOOD PRESSURE: 65 MMHG | OXYGEN SATURATION: 95 % | SYSTOLIC BLOOD PRESSURE: 108 MMHG | HEIGHT: 68 IN | TEMPERATURE: 97.8 F | HEART RATE: 68 BPM | WEIGHT: 165 LBS

## 2018-11-07 DIAGNOSIS — Z86.010 HISTORY OF COLON POLYPS: ICD-10-CM

## 2018-11-07 PROCEDURE — 94640 AIRWAY INHALATION TREATMENT: CPT

## 2018-11-07 PROCEDURE — 94799 UNLISTED PULMONARY SVC/PX: CPT

## 2018-11-07 PROCEDURE — 25010000002 PROPOFOL 10 MG/ML EMULSION: Performed by: ANESTHESIOLOGY

## 2018-11-07 PROCEDURE — 45380 COLONOSCOPY AND BIOPSY: CPT | Performed by: COLON & RECTAL SURGERY

## 2018-11-07 PROCEDURE — 88305 TISSUE EXAM BY PATHOLOGIST: CPT | Performed by: COLON & RECTAL SURGERY

## 2018-11-07 RX ORDER — PROPOFOL 10 MG/ML
VIAL (ML) INTRAVENOUS CONTINUOUS PRN
Status: DISCONTINUED | OUTPATIENT
Start: 2018-11-07 | End: 2018-11-07 | Stop reason: SURG

## 2018-11-07 RX ORDER — SODIUM CHLORIDE 0.9 % (FLUSH) 0.9 %
3 SYRINGE (ML) INJECTION EVERY 12 HOURS SCHEDULED
Status: DISCONTINUED | OUTPATIENT
Start: 2018-11-07 | End: 2018-11-07 | Stop reason: HOSPADM

## 2018-11-07 RX ORDER — LIDOCAINE HYDROCHLORIDE 20 MG/ML
INJECTION, SOLUTION INFILTRATION; PERINEURAL AS NEEDED
Status: DISCONTINUED | OUTPATIENT
Start: 2018-11-07 | End: 2018-11-07 | Stop reason: SURG

## 2018-11-07 RX ORDER — PROPOFOL 10 MG/ML
VIAL (ML) INTRAVENOUS AS NEEDED
Status: DISCONTINUED | OUTPATIENT
Start: 2018-11-07 | End: 2018-11-07 | Stop reason: SURG

## 2018-11-07 RX ORDER — SODIUM CHLORIDE, SODIUM LACTATE, POTASSIUM CHLORIDE, CALCIUM CHLORIDE 600; 310; 30; 20 MG/100ML; MG/100ML; MG/100ML; MG/100ML
30 INJECTION, SOLUTION INTRAVENOUS CONTINUOUS PRN
Status: DISCONTINUED | OUTPATIENT
Start: 2018-11-07 | End: 2018-11-07 | Stop reason: HOSPADM

## 2018-11-07 RX ORDER — IPRATROPIUM BROMIDE AND ALBUTEROL SULFATE 2.5; .5 MG/3ML; MG/3ML
3 SOLUTION RESPIRATORY (INHALATION) ONCE
Status: COMPLETED | OUTPATIENT
Start: 2018-11-07 | End: 2018-11-07

## 2018-11-07 RX ORDER — SODIUM CHLORIDE 0.9 % (FLUSH) 0.9 %
3-10 SYRINGE (ML) INJECTION AS NEEDED
Status: DISCONTINUED | OUTPATIENT
Start: 2018-11-07 | End: 2018-11-07 | Stop reason: HOSPADM

## 2018-11-07 RX ADMIN — SODIUM CHLORIDE, POTASSIUM CHLORIDE, SODIUM LACTATE AND CALCIUM CHLORIDE: 600; 310; 30; 20 INJECTION, SOLUTION INTRAVENOUS at 09:47

## 2018-11-07 RX ADMIN — LIDOCAINE HYDROCHLORIDE 40 MG: 20 INJECTION, SOLUTION INFILTRATION; PERINEURAL at 09:51

## 2018-11-07 RX ADMIN — IPRATROPIUM BROMIDE AND ALBUTEROL SULFATE 3 ML: 2.5; .5 SOLUTION RESPIRATORY (INHALATION) at 10:22

## 2018-11-07 RX ADMIN — PROPOFOL 50 MG: 10 INJECTION, EMULSION INTRAVENOUS at 09:51

## 2018-11-07 RX ADMIN — PROPOFOL 140 MCG/KG/MIN: 10 INJECTION, EMULSION INTRAVENOUS at 09:51

## 2018-11-07 RX ADMIN — SODIUM CHLORIDE, POTASSIUM CHLORIDE, SODIUM LACTATE AND CALCIUM CHLORIDE 30 ML/HR: 600; 310; 30; 20 INJECTION, SOLUTION INTRAVENOUS at 09:19

## 2018-11-07 NOTE — H&P
Gab Maloney is a 82 y.o. male  who is referred by Sabina Sparrow MD for a colonoscopy. He is an  has a history of previous colon cancer.     He denies any change in bowel function, melena, or hematochezia.    Past Medical History:   Diagnosis Date   • Arthritis    • BPH (benign prostatic hyperplasia)    • Cataract     BILATERAL   • Colon polyps    • Dupuytren's contracture    • Elevated TSH    • History of chemotherapy 2011    FOLLOWED BY DR. MURTAZA REDMAN   • History of radiation therapy 2011    FOLLOWED BY DR. ROYCE BECKWITH   • Hyperlipidemia    • Hypertension    • Nocturia    • Poliomyelitis 1950   • Rectal cancer (CMS/HCC) 03/22/2011   • Serum potassium elevated 03/07/2016       Past Surgical History:   Procedure Laterality Date   • APPENDECTOMY N/A 1943   • COLON SURGERY N/A 07/08/2011    LOW ANTERIOR RESECTION OF THE RECTOSIGMOID WITH TEMPORARY ILEOSTOMY, DR. FLIP EVANGELISTA AT [facility]   • COLON SURGERY N/A 10/11/2011    TAKEDOWN OF ILEOSTOMY, DR. FLIP EVANGELISTA AT [facility]   • COLONOSCOPY N/A 06/17/2011    CY TO UPPER SIGMOID WITH BIOPSY AND TATTOOING, DR. FLIP EVANGELISTA AT [facility]   • COLONOSCOPY N/A 10/11/2011    SCOPED PRIOR TO TAKEDOWN OF ILEOSTOMY, ENTIRE COLON WNL, RESCOPE IN 1 YR, DR. FLIP EVANGELISTA AT [facility]   • COLONOSCOPY W/ POLYPECTOMY N/A 11/04/2015    5 MM SESSILE HYPERPLASTIC POLYP IN HEPATIC FLEXURE, 5 MM SESSILE HYPERPLASTIC POLYP IN DESCENDING, RESCOPE IN 3 YRS, DR. SABINA SPARROW AT [facility]   • COLONOSCOPY W/ POLYPECTOMY N/A 10/31/2013    2 ADENOMATOUS POLYPS, 3 HYPERPLASTIC POLYPS, DIVERTICULOSIS, PATENT END TO END ANASTAMOSIS, RESCOPE IN 2 YRS, DR. SABINA SPARROW AT [facility]   • COLONOSCOPY W/ POLYPECTOMY N/A 03/22/2011    RECTAL MASS, ADENOMATOUS POLYP, DR. MARYAM YBARRA AT [place]    • COLONOSCOPY W/ POLYPECTOMY N/A 10/24/2012    1 TUBULAR ADENOMA POLYP, 2 HYPERPLASTIC POLYPS, PATENT ANASTAMOSIS, RESCOPE IN 1 YR, DR. SBAINA SPARROW AT [facility]   • DUPUYTREN CONTRACTURE RELEASE N/A 2005   • EYE SURGERY Bilateral  2006    CATARACT EXTRACTION   • EYE SURGERY  2011    LASER   • EYE SURGERY  2012    LASER   • PORTACATH PLACEMENT Right 04/15/2011    RIGHT JUGULAR, DR. KISHA CONTRERAS AT [facility]   • SIGMOIDOSCOPY N/A 03/31/2011    LESION AT 6 CM ABOVE DENATATE LINE, DR. FLIP EVANGELISTA       Prescriptions Prior to Admission   Medication Sig Dispense Refill Last Dose   • atorvastatin (LIPITOR) 10 MG tablet Take 1 tablet by mouth Every Night for 180 days. 90 tablet 1 11/6/2018 at Unknown time   • lisinopril (PRINIVIL,ZESTRIL) 20 MG tablet Take 1 tablet by mouth Daily for 180 days. 90 tablet 1 11/6/2018 at Unknown time   • Vitamin D, Cholecalciferol, 1000 UNITS capsule Take  by mouth.   11/6/2018 at Unknown time       Allergies   Allergen Reactions   • Asa [Aspirin] Anaphylaxis     SWELLING OF THROAT   • Ibuprofen Swelling     SWELLING OF EYES       Family History   Problem Relation Age of Onset   • Heart disease Father    • Diabetes Father    • Prostate cancer Brother    • No Known Problems Maternal Grandmother    • No Known Problems Maternal Grandfather    • No Known Problems Paternal Grandmother    • No Known Problems Paternal Grandfather    • Pancreatitis Mother        Social History     Social History   • Marital status:      Spouse name: N/A   • Number of children: N/A   • Years of education: N/A     Occupational History   • Not on file.     Social History Main Topics   • Smoking status: Former Smoker     Years: 20.00     Types: Cigarettes   • Smokeless tobacco: Never Used      Comment: quit 48 yrs ago   • Alcohol use Yes      Comment: RARELY   • Drug use: No   • Sexual activity: Defer     Other Topics Concern   • Not on file     Social History Narrative   • No narrative on file       Review of Systems   Gastrointestinal: Negative for abdominal pain, nausea and vomiting.   All other systems reviewed and are negative.      Vitals:    11/07/18 0908   BP: 119/66   Pulse: 68   Resp: 16   Temp: 97.8 °F (36.6 °C)   SpO2: 95%          Physical Exam   Constitutional: He is oriented to person, place, and time. He appears well-developed and well-nourished.   HENT:   Head: Normocephalic and atraumatic.   Eyes: Pupils are equal, round, and reactive to light. EOM are normal.   Cardiovascular: Regular rhythm.    Pulmonary/Chest: Effort normal.   Abdominal: Soft. He exhibits no distension.   Musculoskeletal: Normal range of motion.   Neurological: He is alert and oriented to person, place, and time.   Skin: Skin is warm and dry.   Psychiatric: Judgment and thought content normal.         Assessment/Plan      previous colon cancer.         I recommend colonoscopy.  I described risks, benefits of the procedure with the patient including but not limited to bleeding, infection, possibility of perforation and possible polypectomy. All of the patient's questions were answered and they would like to proceed with the above recommendations.

## 2018-11-07 NOTE — ANESTHESIA POSTPROCEDURE EVALUATION
"Patient: Gab Maloney    Procedure Summary     Date:  11/07/18 Room / Location:  [facility] ENDOSCOPY 9 /  [facility]    Anesthesia Start:  0947 Anesthesia Stop:  1016    Procedure:  COLONOSCOPY INTO CECUM WITH POLYPECTOMY (N/A ) Diagnosis:       History of colon polyps      (History of colon polyps [Z86.010])    Surgeon:  Velia Sparrow MD Provider:  Bradford Wakefield MD    Anesthesia Type:  MAC ASA Status:  3          Anesthesia Type: MAC  Last vitals  BP   101/77 (11/07/18 1024)   Temp   36.6 °C (97.8 °F) (11/07/18 0908)   Pulse   67 (11/07/18 1028)   Resp   20 (11/07/18 1028)     SpO2   94 % (11/07/18 1024)     Post Anesthesia Care and Evaluation    Patient location during evaluation: bedside  Patient participation: complete - patient participated  Level of consciousness: awake  Pain score: 2  Pain management: adequate  Airway patency: patent  Anesthetic complications: No anesthetic complications  PONV Status: none  Cardiovascular status: acceptable  Respiratory status: acceptable  Hydration status: acceptable    Comments: /77 (BP Location: Left arm, Patient Position: Lying)   Pulse 67   Temp 36.6 °C (97.8 °F) (Oral)   Resp 20   Ht 172.7 cm (68\")   Wt 74.8 kg (165 lb)   SpO2 94%   BMI 25.09 kg/m²         "

## 2018-11-07 NOTE — ANESTHESIA PREPROCEDURE EVALUATION
Anesthesia Evaluation     Patient summary reviewed and Nursing notes reviewed   NPO Solid Status: > 8 hours  NPO Liquid Status: > 2 hours           Airway   Mallampati: II  TM distance: >3 FB  Neck ROM: full  Dental - normal exam     Pulmonary - normal exam   (+) a smoker Former,   Cardiovascular - normal exam    (+) hypertension, hyperlipidemia,       Neuro/Psych- negative ROS  GI/Hepatic/Renal/Endo - negative ROS     Musculoskeletal     Abdominal    Substance History - negative use     OB/GYN negative ob/gyn ROS         Other   (+) arthritis   history of cancer                    Anesthesia Plan    ASA 3     MAC     Anesthetic plan, all risks, benefits, and alternatives have been provided, discussed and informed consent has been obtained with: patient.

## 2018-11-08 LAB
CYTO UR: NORMAL
LAB AP CASE REPORT: NORMAL
PATH REPORT.FINAL DX SPEC: NORMAL
PATH REPORT.GROSS SPEC: NORMAL

## 2019-02-24 ENCOUNTER — RESULTS ENCOUNTER (OUTPATIENT)
Dept: FAMILY MEDICINE CLINIC | Facility: CLINIC | Age: 84
End: 2019-02-24

## 2019-02-24 DIAGNOSIS — E78.49 OTHER HYPERLIPIDEMIA: ICD-10-CM

## 2019-02-24 DIAGNOSIS — I10 ESSENTIAL HYPERTENSION: ICD-10-CM

## 2019-02-24 DIAGNOSIS — R79.89 ELEVATED TSH: ICD-10-CM

## 2019-03-26 LAB
ALBUMIN SERPL-MCNC: 4.3 G/DL (ref 3.5–5.2)
ALBUMIN/GLOB SERPL: 1.7 G/DL
ALP SERPL-CCNC: 59 U/L (ref 39–117)
ALT SERPL-CCNC: 14 U/L (ref 1–41)
AST SERPL-CCNC: 14 U/L (ref 1–40)
BASOPHILS # BLD AUTO: 0.11 10*3/MM3 (ref 0–0.2)
BASOPHILS NFR BLD AUTO: 1.5 % (ref 0–1.5)
BILIRUB SERPL-MCNC: 0.6 MG/DL (ref 0.2–1.2)
BUN SERPL-MCNC: 19 MG/DL (ref 8–23)
BUN/CREAT SERPL: 14.3 (ref 7–25)
CALCIUM SERPL-MCNC: 9.4 MG/DL (ref 8.6–10.5)
CHLORIDE SERPL-SCNC: 102 MMOL/L (ref 98–107)
CHOLEST SERPL-MCNC: 163 MG/DL (ref 0–200)
CO2 SERPL-SCNC: 27.6 MMOL/L (ref 22–29)
CREAT SERPL-MCNC: 1.33 MG/DL (ref 0.76–1.27)
EOSINOPHIL # BLD AUTO: 0.56 10*3/MM3 (ref 0–0.4)
EOSINOPHIL NFR BLD AUTO: 7.5 % (ref 0.3–6.2)
ERYTHROCYTE [DISTWIDTH] IN BLOOD BY AUTOMATED COUNT: 13.9 % (ref 12.3–15.4)
GLOBULIN SER CALC-MCNC: 2.5 GM/DL
GLUCOSE SERPL-MCNC: 98 MG/DL (ref 65–99)
HCT VFR BLD AUTO: 45.8 % (ref 37.5–51)
HDLC SERPL-MCNC: 49 MG/DL (ref 40–60)
HGB BLD-MCNC: 14.4 G/DL (ref 13–17.7)
IMM GRANULOCYTES # BLD AUTO: 0.03 10*3/MM3 (ref 0–0.05)
IMM GRANULOCYTES NFR BLD AUTO: 0.4 % (ref 0–0.5)
LDLC SERPL CALC-MCNC: 83 MG/DL (ref 0–100)
LDLC/HDLC SERPL: 1.7 {RATIO}
LYMPHOCYTES # BLD AUTO: 2.03 10*3/MM3 (ref 0.7–3.1)
LYMPHOCYTES NFR BLD AUTO: 27 % (ref 19.6–45.3)
MCH RBC QN AUTO: 31.2 PG (ref 26.6–33)
MCHC RBC AUTO-ENTMCNC: 31.4 G/DL (ref 31.5–35.7)
MCV RBC AUTO: 99.1 FL (ref 79–97)
MONOCYTES # BLD AUTO: 0.67 10*3/MM3 (ref 0.1–0.9)
MONOCYTES NFR BLD AUTO: 8.9 % (ref 5–12)
NEUTROPHILS # BLD AUTO: 4.11 10*3/MM3 (ref 1.4–7)
NEUTROPHILS NFR BLD AUTO: 54.7 % (ref 42.7–76)
NRBC BLD AUTO-RTO: 0 /100 WBC (ref 0–0)
PLATELET # BLD AUTO: 267 10*3/MM3 (ref 140–450)
POTASSIUM SERPL-SCNC: 5.1 MMOL/L (ref 3.5–5.2)
PROT SERPL-MCNC: 6.8 G/DL (ref 6–8.5)
RBC # BLD AUTO: 4.62 10*6/MM3 (ref 4.14–5.8)
SODIUM SERPL-SCNC: 141 MMOL/L (ref 136–145)
TRIGL SERPL-MCNC: 154 MG/DL (ref 0–150)
TSH SERPL DL<=0.005 MIU/L-ACNC: 7.96 MIU/ML (ref 0.27–4.2)
VLDLC SERPL CALC-MCNC: 30.8 MG/DL (ref 5–40)
WBC # BLD AUTO: 7.51 10*3/MM3 (ref 3.4–10.8)

## 2019-04-03 ENCOUNTER — OFFICE VISIT (OUTPATIENT)
Dept: FAMILY MEDICINE CLINIC | Facility: CLINIC | Age: 84
End: 2019-04-03

## 2019-04-03 VITALS
BODY MASS INDEX: 26.37 KG/M2 | OXYGEN SATURATION: 98 % | RESPIRATION RATE: 16 BRPM | HEIGHT: 68 IN | TEMPERATURE: 97 F | HEART RATE: 53 BPM | SYSTOLIC BLOOD PRESSURE: 131 MMHG | DIASTOLIC BLOOD PRESSURE: 74 MMHG | WEIGHT: 174 LBS

## 2019-04-03 DIAGNOSIS — I10 ESSENTIAL HYPERTENSION: Primary | ICD-10-CM

## 2019-04-03 DIAGNOSIS — E78.49 OTHER HYPERLIPIDEMIA: ICD-10-CM

## 2019-04-03 DIAGNOSIS — R79.89 ELEVATED TSH: ICD-10-CM

## 2019-04-03 PROCEDURE — 99214 OFFICE O/P EST MOD 30 MIN: CPT | Performed by: FAMILY MEDICINE

## 2019-04-03 RX ORDER — LISINOPRIL 20 MG/1
20 TABLET ORAL DAILY
Qty: 90 TABLET | Refills: 1 | Status: SHIPPED | OUTPATIENT
Start: 2019-04-03 | End: 2019-10-07 | Stop reason: SDUPTHER

## 2019-04-03 RX ORDER — ATORVASTATIN CALCIUM 10 MG/1
10 TABLET, FILM COATED ORAL NIGHTLY
Qty: 90 TABLET | Refills: 1 | Status: SHIPPED | OUTPATIENT
Start: 2019-04-03 | End: 2019-10-07 | Stop reason: SDUPTHER

## 2019-04-03 RX ORDER — HEPATITIS A VACCINE 1440 [IU]/ML
1 INJECTION, SUSPENSION INTRAMUSCULAR
COMMUNITY
Start: 2019-01-03 | End: 2019-04-03

## 2019-04-03 NOTE — PROGRESS NOTES
"Chief Complaint   Patient presents with   • Med Management       Subjective     Gab Maloney presents to the office today to refill his medications and review recent labs. No medication side effects are reported.  Today his blood pressure is controlled.  Lipids are stable on current therapy.  Once again he has elevated TSH.  He denies any fatigue.  We will check thyroid ultrasound.  Overall he feels well.    I have reviewed and updated his medications, medical history and problem list during today's office visit.      Patient Care Team:  Franck Moreno MD as PCP - General  Franck Moreno MD as PCP - Family Medicine    Social History     Tobacco Use   • Smoking status: Former Smoker     Years: 20.00     Types: Cigarettes   • Smokeless tobacco: Never Used   • Tobacco comment: quit 48 yrs ago   Substance Use Topics   • Alcohol use: Yes     Comment: RARELY       Review of Systems   Constitutional: Negative for fatigue.   Cardiovascular: Negative for chest pain.       Objective     /74   Pulse 53   Temp 97 °F (36.1 °C) (Oral)   Resp 16   Ht 172.7 cm (68\")   Wt 78.9 kg (174 lb)   SpO2 98%   BMI 26.46 kg/m²     Body mass index is 26.46 kg/m².    Physical Exam   Constitutional: He is oriented to person, place, and time. He appears well-developed. No distress.   Eyes: Conjunctivae and lids are normal.   Neck: Carotid bruit is not present.   Cardiovascular: Normal rate, regular rhythm and normal heart sounds.   Pulmonary/Chest: Effort normal and breath sounds normal.   Neurological: He is alert and oriented to person, place, and time.   Skin: Skin is warm and dry.   Psychiatric: He has a normal mood and affect. His behavior is normal.   Vitals reviewed.      Data Reviewed:             Lab Results   Component Value Date    GLU 98 03/26/2019    BUN 19 03/26/2019    CREATININE 1.33 (H) 03/26/2019    EGFRIFNONA 51 (L) 03/26/2019    EGFRIFAFRI 62 03/26/2019     03/26/2019    K 5.1 03/26/2019     " 03/26/2019    CO2 27.6 03/26/2019    CALCIUM 9.4 03/26/2019    ALBUMIN 4.30 03/26/2019    LABGLOBREF 2.5 03/26/2019    BILITOT 0.6 03/26/2019    ALKPHOS 59 03/26/2019    AST 14 03/26/2019    ALT 14 03/26/2019    CHLPL 163 03/26/2019    TRIG 154 (H) 03/26/2019    HDL 49 03/26/2019    VLDL 30.8 03/26/2019    LDL 83 03/26/2019    LDLHDL 1.70 03/26/2019    WBC 7.51 03/26/2019    RBC 4.62 03/26/2019    HCT 45.8 03/26/2019    MCV 99.1 (H) 03/26/2019    MCH 31.2 03/26/2019    MCHC 31.4 (L) 03/26/2019    RDW 13.9 03/26/2019    TSH 7.960 (H) 03/26/2019    PSA 1.050 09/18/2018          Assessment/Plan     Diagnoses and all orders for this visit:    1. Essential hypertension (Primary)  Assessment & Plan:  Hypertension is unchanged.  Continue current treatment regimen.  Blood pressure will be reassessed at the next regular appointment.    Orders:  -     lisinopril (PRINIVIL,ZESTRIL) 20 MG tablet; Take 1 tablet by mouth Daily for 180 days.  Dispense: 90 tablet; Refill: 1  -     Comprehensive Metabolic Panel; Future  -     CBC & Differential; Future  -     TSH; Future    2. Other hyperlipidemia  Assessment & Plan:  Lipid abnormalities are unchanged.  Pharmacotherapy as ordered.  Lipids will be reassessed in 6 months.    Orders:  -     atorvastatin (LIPITOR) 10 MG tablet; Take 1 tablet by mouth Every Night for 180 days.  Dispense: 90 tablet; Refill: 1  -     Lipid Panel With / Chol / HDL Ratio; Future  -     CK; Future    3. Elevated TSH  Assessment & Plan:  Worsening, check thyroid US    Orders:  -     US Thyroid; Future  -     TSH; Future      Orders Placed This Encounter   Procedures   • US Thyroid     Standing Status:   Future     Standing Expiration Date:   9/30/2019     Order Specific Question:   Reason for Exam:     Answer:   elevated tsh   • Comprehensive Metabolic Panel     Standing Status:   Future     Standing Expiration Date:   12/29/2019   • Lipid Panel With / Chol / HDL Ratio     Standing Status:   Future      Standing Expiration Date:   12/29/2019   • CK     Standing Status:   Future     Standing Expiration Date:   12/29/2019   • TSH     Standing Status:   Future     Standing Expiration Date:   12/29/2019   • CBC & Differential     Standing Status:   Future     Standing Expiration Date:   12/29/2019     Order Specific Question:   Manual Differential     Answer:   No         Current Outpatient Medications:   •  atorvastatin (LIPITOR) 10 MG tablet, Take 1 tablet by mouth Every Night for 180 days., Disp: 90 tablet, Rfl: 1  •  lisinopril (PRINIVIL,ZESTRIL) 20 MG tablet, Take 1 tablet by mouth Daily for 180 days., Disp: 90 tablet, Rfl: 1  •  Vitamin D, Cholecalciferol, 1000 UNITS capsule, Take  by mouth., Disp: , Rfl:     Return in about 6 months (around 10/3/2019) for Medicare Wellness and regular visit, 30 minutes.

## 2019-04-11 ENCOUNTER — HOSPITAL ENCOUNTER (OUTPATIENT)
Dept: ULTRASOUND IMAGING | Facility: HOSPITAL | Age: 84
Discharge: HOME | End: 2019-04-11
Admitting: FAMILY MEDICINE

## 2019-04-11 DIAGNOSIS — R79.89 ELEVATED TSH: ICD-10-CM

## 2019-04-11 PROCEDURE — 76536 US EXAM OF HEAD AND NECK: CPT

## 2019-04-15 NOTE — PROGRESS NOTES
Please call Gab about the normal result. Please send him a copy of the result.  Have him call us if there is any further questions. Thanks, Dr. Moreno

## 2019-08-31 ENCOUNTER — RESULTS ENCOUNTER (OUTPATIENT)
Dept: FAMILY MEDICINE CLINIC | Facility: CLINIC | Age: 84
End: 2019-08-31

## 2019-08-31 DIAGNOSIS — E78.49 OTHER HYPERLIPIDEMIA: ICD-10-CM

## 2019-08-31 DIAGNOSIS — R79.89 ELEVATED TSH: ICD-10-CM

## 2019-08-31 DIAGNOSIS — I10 ESSENTIAL HYPERTENSION: ICD-10-CM

## 2019-09-25 LAB
ALBUMIN SERPL-MCNC: 4.1 G/DL (ref 3.5–5.2)
ALBUMIN/GLOB SERPL: 1.7 G/DL
ALP SERPL-CCNC: 56 U/L (ref 39–117)
ALT SERPL-CCNC: 13 U/L (ref 1–41)
AST SERPL-CCNC: 15 U/L (ref 1–40)
BASOPHILS # BLD AUTO: 0.09 10*3/MM3 (ref 0–0.2)
BASOPHILS NFR BLD AUTO: 1.3 % (ref 0–1.5)
BILIRUB SERPL-MCNC: 0.6 MG/DL (ref 0.2–1.2)
BUN SERPL-MCNC: 21 MG/DL (ref 8–23)
BUN/CREAT SERPL: 19.4 (ref 7–25)
CALCIUM SERPL-MCNC: 9.2 MG/DL (ref 8.6–10.5)
CHLORIDE SERPL-SCNC: 103 MMOL/L (ref 98–107)
CHOLEST SERPL-MCNC: 169 MG/DL (ref 0–200)
CHOLEST/HDLC SERPL: 3.31 {RATIO}
CK SERPL-CCNC: 58 U/L (ref 20–200)
CO2 SERPL-SCNC: 26.2 MMOL/L (ref 22–29)
CREAT SERPL-MCNC: 1.08 MG/DL (ref 0.76–1.27)
EOSINOPHIL # BLD AUTO: 0.47 10*3/MM3 (ref 0–0.4)
EOSINOPHIL NFR BLD AUTO: 6.9 % (ref 0.3–6.2)
ERYTHROCYTE [DISTWIDTH] IN BLOOD BY AUTOMATED COUNT: 13.1 % (ref 12.3–15.4)
GLOBULIN SER CALC-MCNC: 2.4 GM/DL
GLUCOSE SERPL-MCNC: 95 MG/DL (ref 65–99)
HCT VFR BLD AUTO: 40.9 % (ref 37.5–51)
HDLC SERPL-MCNC: 51 MG/DL (ref 40–60)
HGB BLD-MCNC: 13.3 G/DL (ref 13–17.7)
IMM GRANULOCYTES # BLD AUTO: 0.03 10*3/MM3 (ref 0–0.05)
IMM GRANULOCYTES NFR BLD AUTO: 0.4 % (ref 0–0.5)
LDLC SERPL CALC-MCNC: 95 MG/DL (ref 0–100)
LYMPHOCYTES # BLD AUTO: 1.95 10*3/MM3 (ref 0.7–3.1)
LYMPHOCYTES NFR BLD AUTO: 28.5 % (ref 19.6–45.3)
MCH RBC QN AUTO: 31 PG (ref 26.6–33)
MCHC RBC AUTO-ENTMCNC: 32.5 G/DL (ref 31.5–35.7)
MCV RBC AUTO: 95.3 FL (ref 79–97)
MONOCYTES # BLD AUTO: 0.62 10*3/MM3 (ref 0.1–0.9)
MONOCYTES NFR BLD AUTO: 9.1 % (ref 5–12)
NEUTROPHILS # BLD AUTO: 3.68 10*3/MM3 (ref 1.7–7)
NEUTROPHILS NFR BLD AUTO: 53.8 % (ref 42.7–76)
NRBC BLD AUTO-RTO: 0 /100 WBC (ref 0–0.2)
PLATELET # BLD AUTO: 247 10*3/MM3 (ref 140–450)
POTASSIUM SERPL-SCNC: 4.7 MMOL/L (ref 3.5–5.2)
PROT SERPL-MCNC: 6.5 G/DL (ref 6–8.5)
RBC # BLD AUTO: 4.29 10*6/MM3 (ref 4.14–5.8)
SODIUM SERPL-SCNC: 141 MMOL/L (ref 136–145)
TRIGL SERPL-MCNC: 117 MG/DL (ref 0–150)
TSH SERPL DL<=0.005 MIU/L-ACNC: 5.1 UIU/ML (ref 0.27–4.2)
VLDLC SERPL CALC-MCNC: 23.4 MG/DL
WBC # BLD AUTO: 6.84 10*3/MM3 (ref 3.4–10.8)

## 2019-10-07 ENCOUNTER — OFFICE VISIT (OUTPATIENT)
Dept: FAMILY MEDICINE CLINIC | Facility: CLINIC | Age: 84
End: 2019-10-07

## 2019-10-07 VITALS
RESPIRATION RATE: 16 BRPM | HEIGHT: 68 IN | TEMPERATURE: 98.9 F | HEART RATE: 61 BPM | SYSTOLIC BLOOD PRESSURE: 129 MMHG | OXYGEN SATURATION: 98 % | WEIGHT: 172 LBS | DIASTOLIC BLOOD PRESSURE: 72 MMHG | BODY MASS INDEX: 26.07 KG/M2

## 2019-10-07 DIAGNOSIS — I10 ESSENTIAL HYPERTENSION: ICD-10-CM

## 2019-10-07 DIAGNOSIS — R35.1 NOCTURIA: ICD-10-CM

## 2019-10-07 DIAGNOSIS — R79.89 ELEVATED TSH: ICD-10-CM

## 2019-10-07 DIAGNOSIS — Z00.00 MEDICARE ANNUAL WELLNESS VISIT, SUBSEQUENT: Primary | ICD-10-CM

## 2019-10-07 DIAGNOSIS — E78.49 OTHER HYPERLIPIDEMIA: ICD-10-CM

## 2019-10-07 PROCEDURE — G0439 PPPS, SUBSEQ VISIT: HCPCS | Performed by: FAMILY MEDICINE

## 2019-10-07 PROCEDURE — 99214 OFFICE O/P EST MOD 30 MIN: CPT | Performed by: FAMILY MEDICINE

## 2019-10-07 RX ORDER — ATORVASTATIN CALCIUM 10 MG/1
10 TABLET, FILM COATED ORAL NIGHTLY
Qty: 90 TABLET | Refills: 1 | Status: SHIPPED | OUTPATIENT
Start: 2019-10-07 | End: 2020-06-25 | Stop reason: SDUPTHER

## 2019-10-07 RX ORDER — LISINOPRIL 20 MG/1
20 TABLET ORAL DAILY
Qty: 90 TABLET | Refills: 1 | Status: SHIPPED | OUTPATIENT
Start: 2019-10-07 | End: 2020-06-25 | Stop reason: SDUPTHER

## 2019-10-07 NOTE — PROGRESS NOTES
The ABCs of the Annual Wellness Visit  Subsequent Medicare Wellness Visit    Chief Complaint   Patient presents with   • Medicare Wellness-subsequent       Subjective   History of Present Illness:  Gab Maloney is a 83 y.o. male who presents for a Subsequent Medicare Wellness Visit.  He is also here to review labs and refill medicines.  Blood pressure control.  Lipids are controlled.  There has been interval improvement in TSH level.  Recent thyroid ultrasound is within normal limits.  He has no symptoms of hypothyroidism currently.  Nocturia symptoms are stable.    HEALTH RISK ASSESSMENT    Recent Hospitalizations:  No hospitalization(s) within the last year.    Current Medical Providers:  Patient Care Team:  Franck Moreno MD as PCP - General  Franck Moreno MD as PCP - Family Medicine    Smoking Status:  Social History     Tobacco Use   Smoking Status Former Smoker   • Years: 20.00   • Types: Cigarettes   Smokeless Tobacco Never Used   Tobacco Comment    quit age 35       Alcohol Consumption:  Social History     Substance and Sexual Activity   Alcohol Use Yes    Comment: RARELY       Depression Screen:   PHQ-2/PHQ-9 Depression Screening 10/7/2019   Little interest or pleasure in doing things 0   Feeling down, depressed, or hopeless 0   Total Score 0       Fall Risk Screen:  STEADI Fall Risk Assessment was completed, and patient is at LOW risk for falls.Assessment completed on:10/7/2019    Health Habits and Functional and Cognitive Screening:  Functional & Cognitive Status 10/9/2019   Do you have difficulty preparing food and eating? No   Do you have difficulty bathing yourself, getting dressed or grooming yourself? No   Do you have difficulty using the toilet? No   Do you have difficulty moving around from place to place? No   Do you have trouble with steps or getting out of a bed or a chair? No         Does the patient have evidence of cognitive impairment? No    Asprin use counseling:Does not need ASA  (and currently is not on it)    Age-appropriate Screening Schedule:  Refer to the list below for future screening recommendations based on patient's age, sex and/or medical conditions. Orders for these recommended tests are listed in the plan section. The patient has been provided with a written plan.    Health Maintenance   Topic Date Due   • TDAP/TD VACCINES (1 - Tdap) 11/19/1954   • ZOSTER VACCINE (2 of 3) 11/29/2010   • INFLUENZA VACCINE  11/20/2019 (Originally 8/1/2019)   • LIPID PANEL  09/24/2020   • PNEUMOCOCCAL VACCINES (65+ LOW/MEDIUM RISK)  Completed          The following portions of the patient's history were reviewed and updated as appropriate: allergies, current medications, past family history, past medical history, past social history, past surgical history and problem list.    Outpatient Medications Prior to Visit   Medication Sig Dispense Refill   • Vitamin D, Cholecalciferol, 1000 UNITS capsule Take  by mouth.     • atorvastatin (LIPITOR) 10 MG tablet Take 1 tablet by mouth Every Night for 180 days. 90 tablet 1   • lisinopril (PRINIVIL,ZESTRIL) 20 MG tablet Take 1 tablet by mouth Daily for 180 days. 90 tablet 1     No facility-administered medications prior to visit.        Patient Active Problem List   Diagnosis   • Other hyperlipidemia   • Essential hypertension   • Elevated TSH   • Nocturia   • History of colon cancer   • History of colon polyps       Advanced Care Planning:  Patient has an advance directive - a copy has not been provided. Have asked the patient to send this to us to add to record    Review of Systems   Constitutional: Negative for fatigue.   Cardiovascular: Negative for chest pain.       Compared to one year ago, the patient feels his physical health is the same.  Compared to one year ago, the patient feels his mental health is the same.    Reviewed chart for potential of high risk medication in the elderly: yes  Reviewed chart for potential of harmful drug interactions in the  "elderly:yes    Objective         Vitals:    10/07/19 1049   BP: 129/72   Pulse: 61   Resp: 16   Temp: 98.9 °F (37.2 °C)   TempSrc: Oral   SpO2: 98%   Weight: 78 kg (172 lb)   Height: 172.7 cm (67.99\")   PainSc: 0-No pain       Body mass index is 26.16 kg/m².  Discussed the patient's BMI with him. The BMI is above average; BMI management plan is completed.    Physical Exam   Constitutional: He is cooperative. No distress.   Eyes: Conjunctivae and lids are normal.   Neck: Carotid bruit is not present. No tracheal deviation present.   Cardiovascular: Normal rate, regular rhythm and normal heart sounds.   No murmur heard.  Pulmonary/Chest: Effort normal and breath sounds normal.   Neurological: He is alert. He is not disoriented.   Skin: Skin is warm and dry.   Psychiatric: He has a normal mood and affect. His speech is normal and behavior is normal.   Vitals reviewed.      Lab Results   Component Value Date    GLU 95 09/24/2019    CHLPL 169 09/24/2019    TRIG 117 09/24/2019    HDL 51 09/24/2019    LDL 95 09/24/2019    VLDL 23.4 09/24/2019        Assessment/Plan   Medicare Risks and Personalized Health Plan  CMS Preventative Services Quick Reference  Advance Directive Discussion  Fall Risk  Immunizations Discussed/Encouraged (specific immunizations; adacel Tdap, Influenza and Shingrix )  Prostate Cancer Screening     The above risks/problems have been discussed with the patient.  Pertinent information has been shared with the patient in the After Visit Summary.  Follow up plans and orders are seen below in the Assessment/Plan Section.    Diagnoses and all orders for this visit:    1. Medicare annual wellness visit, subsequent (Primary)    2. Essential hypertension  Assessment & Plan:  Hypertension is unchanged.  Continue current treatment regimen.  Blood pressure will be reassessed at the next regular appointment.    Orders:  -     lisinopril (PRINIVIL,ZESTRIL) 20 MG tablet; Take 1 tablet by mouth Daily for 180 days.  " Dispense: 90 tablet; Refill: 1  -     Comprehensive Metabolic Panel; Future  -     CBC & Differential; Future  -     TSH; Future    3. Other hyperlipidemia  Assessment & Plan:  Lipid abnormalities are unchanged.  Pharmacotherapy as ordered.  Lipids will be reassessed in 6 months.    Orders:  -     atorvastatin (LIPITOR) 10 MG tablet; Take 1 tablet by mouth Every Night for 180 days.  Dispense: 90 tablet; Refill: 1  -     Lipid Panel With / Chol / HDL Ratio; Future    4. Elevated TSH  Assessment & Plan:  Improved.  Thyroid ultrasound normal.    Orders:  -     TSH; Future    5. Nocturia  Assessment & Plan:  Condition unchanged. Pt does not want therapy for this.     Orders:  -     PSA DIAGNOSTIC; Future    Follow Up:  Return in about 6 months (around 4/7/2020) for Recheck.     An After Visit Summary and PPPS were given to the patient.

## 2019-10-07 NOTE — PATIENT INSTRUCTIONS
Check on insurance coverage and cost for Shingrix (newest shingles vaccine) and get the immunization at your local pharmacy. It is more effective than the old Zostavax vaccine and is recommended even if you have had the Zostavax vaccine in the past. For more information, please look at the website below:    https://www.cdc.gov/vaccines/vpd/shingles/public/shingrix/index.html    Check on insurance coverage and cost for adacel Tdap(tetanus plus whooping cough vaccine) and get the immunization at your local pharmacy  Annual flu shot has been recommended to patient. Optimal timing of this vaccination is in mid October of each year.     Exercising to Lose Weight  Exercise is structured, repetitive physical activity to improve fitness and health. Getting regular exercise is important for everyone. It is especially important if you are overweight. Being overweight increases your risk of heart disease, stroke, diabetes, high blood pressure, and several types of cancer. Reducing your calorie intake and exercising can help you lose weight.  Exercise is usually categorized as moderate or vigorous intensity. To lose weight, most people need to do a certain amount of moderate-intensity or vigorous-intensity exercise each week.  Moderate-intensity exercise    Moderate-intensity exercise is any activity that gets you moving enough to burn at least three times more energy (calories) than if you were sitting.  Examples of moderate exercise include:  · Walking a mile in 15 minutes.  · Doing light yard work.  · Biking at an easy pace.  Most people should get at least 150 minutes (2 hours and 30 minutes) a week of moderate-intensity exercise to maintain their body weight.  Vigorous-intensity exercise  Vigorous-intensity exercise is any activity that gets you moving enough to burn at least six times more calories than if you were sitting. When you exercise at this intensity, you should be working hard enough that you are not able to  carry on a conversation.  Examples of vigorous exercise include:  · Running.  · Playing a team sport, such as football, basketball, and soccer.  · Jumping rope.  Most people should get at least 75 minutes (1 hour and 15 minutes) a week of vigorous-intensity exercise to maintain their body weight.  How can exercise affect me?  When you exercise enough to burn more calories than you eat, you lose weight. Exercise also reduces body fat and builds muscle. The more muscle you have, the more calories you burn. Exercise also:  · Improves mood.  · Reduces stress and tension.  · Improves your overall fitness, flexibility, and endurance.  · Increases bone strength.  The amount of exercise you need to lose weight depends on:  · Your age.  · The type of exercise.  · Any health conditions you have.  · Your overall physical ability.  Talk to your health care provider about how much exercise you need and what types of activities are safe for you.  What actions can I take to lose weight?  Nutrition    · Make changes to your diet as told by your health care provider or diet and nutrition specialist (dietitian). This may include:  ? Eating fewer calories.  ? Eating more protein.  ? Eating less unhealthy fats.  ? Eating a diet that includes fresh fruits and vegetables, whole grains, low-fat dairy products, and lean protein.  ? Avoiding foods with added fat, salt, and sugar.  · Drink plenty of water while you exercise to prevent dehydration or heat stroke.  Activity  · Choose an activity that you enjoy and set realistic goals. Your health care provider can help you make an exercise plan that works for you.  · Exercise at a moderate or vigorous intensity most days of the week.  ? The intensity of exercise may vary from person to person. You can tell how intense a workout is for you by paying attention to your breathing and heartbeat. Most people will notice their breathing and heartbeat get faster with more intense exercise.  · Do  resistance training twice each week, such as:  ? Push-ups.  ? Sit-ups.  ? Lifting weights.  ? Using resistance bands.  · Getting short amounts of exercise can be just as helpful as long structured periods of exercise. If you have trouble finding time to exercise, try to include exercise in your daily routine.  ? Get up, stretch, and walk around every 30 minutes throughout the day.  ? Go for a walk during your lunch break.  ? Park your car farther away from your destination.  ? If you take public transportation, get off one stop early and walk the rest of the way.  ? Make phone calls while standing up and walking around.  ? Take the stairs instead of elevators or escalators.  · Wear comfortable clothes and shoes with good support.  · Do not exercise so much that you hurt yourself, feel dizzy, or get very short of breath.  Where to find more information  · [place] Department of Health and Human Services: www.hhs.gov  · Centers for Disease Control and Prevention (CDC): www.cdc.gov  Contact a health care provider:  · Before starting a new exercise program.  · If you have questions or concerns about your weight.  · If you have a medical problem that keeps you from exercising.  Get help right away if you have any of the following while exercising:  · Injury.  · Dizziness.  · Difficulty breathing or shortness of breath that does not go away when you stop exercising.  · Chest pain.  · Rapid heartbeat.  Summary  · Being overweight increases your risk of heart disease, stroke, diabetes, high blood pressure, and several types of cancer.  · Losing weight happens when you burn more calories than you eat.  · Reducing the amount of calories you eat in addition to getting regular moderate or vigorous exercise each week helps you lose weight.  This information is not intended to replace advice given to you by your health care provider. Make sure you discuss any questions you have with your health care provider.  Document Released:  01/20/2012 Document Revised: 12/31/2018 Document Reviewed: 12/31/2018  [employer] Interactive Patient Education © 2019 [employer]      Fall Prevention in the Home, Adult  Falls can cause injuries and can affect people from all age groups. There are many simple things that you can do to make your home safe and to help prevent falls. Ask for help when making these changes, if needed.  What actions can I take to prevent falls?  General instructions  · Use good lighting in all rooms. Replace any light bulbs that burn out.  · Turn on lights if it is dark. Use night-lights.  · Place frequently used items in easy-to-reach places. Lower the shelves around your home if necessary.  · Set up furniture so that there are clear paths around it. Avoid moving your furniture around.  · Remove throw rugs and other tripping hazards from the floor.  · Avoid walking on wet floors.  · Fix any uneven floor surfaces.  · Add color or contrast paint or tape to grab bars and handrails in your home. Place contrasting color strips on the first and last steps of stairways.  · When you use a stepladder, make sure that it is completely opened and that the sides are firmly locked. Have someone hold the ladder while you are using it. Do not climb a closed stepladder.  · Be aware of any and all pets.  What can I do in the bathroom?    · Keep the floor dry. Immediately clean up any water that spills onto the floor.  · Remove soap buildup in the tub or shower on a regular basis.  · Use non-skid mats or decals on the floor of the tub or shower.  · Attach bath mats securely with double-sided, non-slip rug tape.  · If you need to sit down while you are in the shower, use a plastic, non-slip stool.  · Install grab bars by the toilet and in the tub and shower. Do not use towel bars as grab bars.  What can I do in the bedroom?  · Make sure that a bedside light is easy to reach.  · Do not use oversized bedding that drapes onto the floor.  · Have a firm chair  that has side arms to use for getting dressed.  What can I do in the kitchen?  · Clean up any spills right away.  · If you need to reach for something above you, use a sturdy step stool that has a grab bar.  · Keep electrical cables out of the way.  · Do not use floor polish or wax that makes floors slippery. If you must use wax, make sure that it is non-skid floor wax.  What can I do in the stairways?  · Do not leave any items on the stairs.  · Make sure that you have a light switch at the top of the stairs and the bottom of the stairs. Have them installed if you do not have them.  · Make sure that there are handrails on both sides of the stairs. Fix handrails that are broken or loose. Make sure that handrails are as long as the stairways.  · Install non-slip stair treads on all stairs in your home.  · Avoid having throw rugs at the top or bottom of stairways, or secure the rugs with carpet tape to prevent them from moving.  · Choose a carpet design that does not hide the edge of steps on the stairway.  · Check any carpeting to make sure that it is firmly attached to the stairs. Fix any carpet that is loose or worn.  What can I do on the outside of my home?  · Use bright outdoor lighting.  · Regularly repair the edges of walkways and driveways and fix any cracks.  · Remove high doorway thresholds.  · Trim any shrubbery on the main path into your home.  · Regularly check that handrails are securely fastened and in good repair. Both sides of any steps should have handrails.  · Install guardrails along the edges of any raised decks or porches.  · Clear walkways of debris and clutter, including tools and rocks.  · Have leaves, snow, and ice cleared regularly.  · Use sand or salt on walkways during winter months.  · In the garage, clean up any spills right away, including grease or oil spills.  What other actions can I take?  · Wear closed-toe shoes that fit well and support your feet. Wear shoes that have rubber soles  or low heels.  · Use mobility aids as needed, such as canes, walkers, scooters, and crutches.  · Review your medicines with your health care provider. Some medicines can cause dizziness or changes in blood pressure, which increase your risk of falling.  Talk with your health care provider about other ways that you can decrease your risk of falls. This may include working with a physical therapist or  to improve your strength, balance, and endurance.  Where to find more information  · Centers for Disease Control and Prevention, STEADI: https://www.cdc.gov  · National [employer] on Aging: https://tj6tbzh.kai.nih.gov  Contact a health care provider if:  · You are afraid of falling at home.  · You feel weak, drowsy, or dizzy at home.  · You fall at home.  Summary  · There are many simple things that you can do to make your home safe and to help prevent falls.  · Ways to make your home safe include removing tripping hazards and installing grab bars in the bathroom.  · Ask for help when making these changes in your home.  This information is not intended to replace advice given to you by your health care provider. Make sure you discuss any questions you have with your health care provider.  Document Released: 12/08/2003 Document Revised: 08/02/2018 Document Reviewed: 08/02/2018  [employer] Interactive Patient Education © 2019 [employer]

## 2020-03-05 ENCOUNTER — RESULTS ENCOUNTER (OUTPATIENT)
Dept: FAMILY MEDICINE CLINIC | Facility: CLINIC | Age: 85
End: 2020-03-05

## 2020-03-05 DIAGNOSIS — E78.49 OTHER HYPERLIPIDEMIA: ICD-10-CM

## 2020-03-05 DIAGNOSIS — R79.89 ELEVATED TSH: ICD-10-CM

## 2020-03-05 DIAGNOSIS — R35.1 NOCTURIA: ICD-10-CM

## 2020-03-05 DIAGNOSIS — I10 ESSENTIAL HYPERTENSION: ICD-10-CM

## 2020-06-18 LAB
ALBUMIN SERPL-MCNC: 4.3 G/DL (ref 3.5–5.2)
ALBUMIN/GLOB SERPL: 1.6 G/DL
ALP SERPL-CCNC: 55 U/L (ref 39–117)
ALT SERPL-CCNC: 19 U/L (ref 1–41)
AST SERPL-CCNC: 18 U/L (ref 1–40)
BASOPHILS # BLD AUTO: 0.13 10*3/MM3 (ref 0–0.2)
BASOPHILS NFR BLD AUTO: 1.6 % (ref 0–1.5)
BILIRUB SERPL-MCNC: 0.7 MG/DL (ref 0.2–1.2)
BUN SERPL-MCNC: 18 MG/DL (ref 8–23)
BUN/CREAT SERPL: 15.8 (ref 7–25)
CALCIUM SERPL-MCNC: 9.6 MG/DL (ref 8.6–10.5)
CHLORIDE SERPL-SCNC: 102 MMOL/L (ref 98–107)
CHOLEST SERPL-MCNC: 171 MG/DL (ref 0–200)
CHOLEST/HDLC SERPL: 3.42 {RATIO}
CO2 SERPL-SCNC: 27 MMOL/L (ref 22–29)
CREAT SERPL-MCNC: 1.14 MG/DL (ref 0.76–1.27)
EOSINOPHIL # BLD AUTO: 0.67 10*3/MM3 (ref 0–0.4)
EOSINOPHIL NFR BLD AUTO: 8.1 % (ref 0.3–6.2)
ERYTHROCYTE [DISTWIDTH] IN BLOOD BY AUTOMATED COUNT: 13.1 % (ref 12.3–15.4)
GLOBULIN SER CALC-MCNC: 2.7 GM/DL
GLUCOSE SERPL-MCNC: 104 MG/DL (ref 65–99)
HCT VFR BLD AUTO: 40.8 % (ref 37.5–51)
HDLC SERPL-MCNC: 50 MG/DL (ref 40–60)
HGB BLD-MCNC: 13.7 G/DL (ref 13–17.7)
IMM GRANULOCYTES # BLD AUTO: 0.02 10*3/MM3 (ref 0–0.05)
IMM GRANULOCYTES NFR BLD AUTO: 0.2 % (ref 0–0.5)
LDLC SERPL CALC-MCNC: 93 MG/DL (ref 0–100)
LYMPHOCYTES # BLD AUTO: 2.6 10*3/MM3 (ref 0.7–3.1)
LYMPHOCYTES NFR BLD AUTO: 31.3 % (ref 19.6–45.3)
MCH RBC QN AUTO: 31.7 PG (ref 26.6–33)
MCHC RBC AUTO-ENTMCNC: 33.6 G/DL (ref 31.5–35.7)
MCV RBC AUTO: 94.4 FL (ref 79–97)
MONOCYTES # BLD AUTO: 0.76 10*3/MM3 (ref 0.1–0.9)
MONOCYTES NFR BLD AUTO: 9.1 % (ref 5–12)
NEUTROPHILS # BLD AUTO: 4.13 10*3/MM3 (ref 1.7–7)
NEUTROPHILS NFR BLD AUTO: 49.7 % (ref 42.7–76)
NRBC BLD AUTO-RTO: 0 /100 WBC (ref 0–0.2)
PLATELET # BLD AUTO: 248 10*3/MM3 (ref 140–450)
POTASSIUM SERPL-SCNC: 5 MMOL/L (ref 3.5–5.2)
PROT SERPL-MCNC: 7 G/DL (ref 6–8.5)
PSA SERPL-MCNC: 2.33 NG/ML (ref 0–4)
RBC # BLD AUTO: 4.32 10*6/MM3 (ref 4.14–5.8)
SODIUM SERPL-SCNC: 137 MMOL/L (ref 136–145)
TRIGL SERPL-MCNC: 138 MG/DL (ref 0–150)
TSH SERPL DL<=0.005 MIU/L-ACNC: 5.46 UIU/ML (ref 0.27–4.2)
VLDLC SERPL CALC-MCNC: 27.6 MG/DL
WBC # BLD AUTO: 8.31 10*3/MM3 (ref 3.4–10.8)

## 2020-06-25 ENCOUNTER — OFFICE VISIT (OUTPATIENT)
Dept: FAMILY MEDICINE CLINIC | Facility: CLINIC | Age: 85
End: 2020-06-25

## 2020-06-25 VITALS
RESPIRATION RATE: 16 BRPM | BODY MASS INDEX: 26.07 KG/M2 | SYSTOLIC BLOOD PRESSURE: 125 MMHG | TEMPERATURE: 97.7 F | WEIGHT: 172 LBS | HEART RATE: 47 BPM | DIASTOLIC BLOOD PRESSURE: 60 MMHG | OXYGEN SATURATION: 98 % | HEIGHT: 68 IN

## 2020-06-25 DIAGNOSIS — E78.49 OTHER HYPERLIPIDEMIA: ICD-10-CM

## 2020-06-25 DIAGNOSIS — R79.89 ELEVATED TSH: ICD-10-CM

## 2020-06-25 DIAGNOSIS — I10 ESSENTIAL HYPERTENSION: Primary | ICD-10-CM

## 2020-06-25 DIAGNOSIS — R35.1 NOCTURIA: ICD-10-CM

## 2020-06-25 PROBLEM — Z86.0100 HISTORY OF COLON POLYPS: Status: RESOLVED | Noted: 2018-08-15 | Resolved: 2020-06-25

## 2020-06-25 PROBLEM — Z86.010 HISTORY OF COLON POLYPS: Status: RESOLVED | Noted: 2018-08-15 | Resolved: 2020-06-25

## 2020-06-25 PROCEDURE — 99214 OFFICE O/P EST MOD 30 MIN: CPT | Performed by: FAMILY MEDICINE

## 2020-06-25 RX ORDER — LISINOPRIL 20 MG/1
20 TABLET ORAL DAILY
Qty: 90 TABLET | Refills: 1 | Status: SHIPPED | OUTPATIENT
Start: 2020-06-25 | End: 2020-12-16 | Stop reason: SDUPTHER

## 2020-06-25 RX ORDER — ATORVASTATIN CALCIUM 10 MG/1
10 TABLET, FILM COATED ORAL NIGHTLY
Qty: 90 TABLET | Refills: 1 | Status: SHIPPED | OUTPATIENT
Start: 2020-06-25 | End: 2020-12-16 | Stop reason: SDUPTHER

## 2020-06-25 NOTE — PROGRESS NOTES
"   Subjective       Chief Complaint   Patient presents with   • Hypertension     6 mo          HPI:       HPI     Gab Maloney is a 84 y.o. male who presents to the office today to review labs and refill medicines.  Blood pressure controlled.  Lipids are stable.  No progression of elevation of TSH. Nocturia stable. He does not wish to check PSA. He states he would not do anything if it were elevated due to his age.         Review of Systems   Constitutional: Negative for fatigue.   Cardiovascular: Negative for chest pain.   Genitourinary:        Nocturia is stable        I have reviewed and confirmed the accuracy of the ROS as documented by myself or MA/LPN/RN Franck Moreno MD   The patient's ACP documentation has  and needs to be reviewed again.      PE:   Objective   /60   Pulse (!) 47   Temp 97.7 °F (36.5 °C) (Tympanic)   Resp 16   Ht 172.7 cm (67.99\")   Wt 78 kg (172 lb)   SpO2 98%   BMI 26.16 kg/m²     Body mass index is 26.16 kg/m².    Physical Exam   Constitutional: He is oriented to person, place, and time. He appears well-developed. No distress.   Eyes: Conjunctivae and lids are normal.   Neck: Carotid bruit is not present.   Cardiovascular: Normal rate, regular rhythm and normal heart sounds.   Pulmonary/Chest: Effort normal and breath sounds normal.   Neurological: He is alert and oriented to person, place, and time.   Skin: Skin is warm and dry.   Psychiatric: He has a normal mood and affect. His behavior is normal.   Vitals reviewed.         Data Reviewed:                  A/P:     Assessment/Plan   Diagnoses and all orders for this visit:    1. Essential hypertension (Primary)  Assessment & Plan:  Hypertension is unchanged.  Continue current treatment regimen.  Blood pressure will be reassessed at the next regular appointment.    Orders:  -     lisinopril (PRINIVIL,ZESTRIL) 20 MG tablet; Take 1 tablet by mouth Daily for 180 days.  Dispense: 90 tablet; Refill: 1    2. Other " hyperlipidemia  Assessment & Plan:  Lipid abnormalities are unchanged.  Pharmacotherapy as ordered.  Lipids will be reassessed in 6 months.    Orders:  -     atorvastatin (LIPITOR) 10 MG tablet; Take 1 tablet by mouth Every Night for 180 days.  Dispense: 90 tablet; Refill: 1    3. Elevated TSH  Assessment & Plan:  Condition stable. Continue annual monitoring      4. Nocturia  Assessment & Plan:  Continue monitoring for stability of symptoms          Follow up:    Return in about 25 weeks (around 12/17/2020) for Medicare Wellness and regular visit, 30 minutes.

## 2020-07-13 ENCOUNTER — TRANSCRIBE ORDERS (OUTPATIENT)
Dept: ADMINISTRATIVE | Facility: HOSPITAL | Age: 85
End: 2020-07-13

## 2020-07-13 DIAGNOSIS — H49.03: Primary | ICD-10-CM

## 2020-07-26 ENCOUNTER — HOSPITAL ENCOUNTER (OUTPATIENT)
Dept: MRI IMAGING | Facility: HOSPITAL | Age: 85
Discharge: HOME | End: 2020-07-26
Admitting: OPHTHALMOLOGY

## 2020-07-26 DIAGNOSIS — H49.03: ICD-10-CM

## 2020-07-26 PROCEDURE — 25010000002 GADOTERATE MEGLUMINE 7.5 MMOL/15ML SOLUTION: Performed by: OPHTHALMOLOGY

## 2020-07-26 PROCEDURE — A9575 INJ GADOTERATE MEGLUMI 0.1ML: HCPCS | Performed by: OPHTHALMOLOGY

## 2020-07-26 PROCEDURE — 70553 MRI BRAIN STEM W/O & W/DYE: CPT

## 2020-07-26 RX ORDER — GADOTERATE MEGLUMINE 376.9 MG/ML
15 INJECTION INTRAVENOUS
Status: COMPLETED | OUTPATIENT
Start: 2020-07-26 | End: 2020-07-26

## 2020-07-26 RX ADMIN — GADOTERATE MEGLUMINE 15 ML: 376.9 INJECTION, SOLUTION INTRAVENOUS at 10:50

## 2020-12-16 ENCOUNTER — OFFICE VISIT (OUTPATIENT)
Dept: FAMILY MEDICINE CLINIC | Facility: CLINIC | Age: 85
End: 2020-12-16

## 2020-12-16 VITALS
WEIGHT: 169 LBS | HEART RATE: 65 BPM | HEIGHT: 68 IN | TEMPERATURE: 96.9 F | SYSTOLIC BLOOD PRESSURE: 120 MMHG | DIASTOLIC BLOOD PRESSURE: 75 MMHG | BODY MASS INDEX: 25.61 KG/M2

## 2020-12-16 DIAGNOSIS — I10 ESSENTIAL HYPERTENSION: ICD-10-CM

## 2020-12-16 DIAGNOSIS — E78.49 OTHER HYPERLIPIDEMIA: ICD-10-CM

## 2020-12-16 DIAGNOSIS — R35.1 NOCTURIA: ICD-10-CM

## 2020-12-16 DIAGNOSIS — Z00.00 MEDICARE ANNUAL WELLNESS VISIT, SUBSEQUENT: Primary | ICD-10-CM

## 2020-12-16 DIAGNOSIS — R79.89 ELEVATED TSH: ICD-10-CM

## 2020-12-16 PROCEDURE — 99214 OFFICE O/P EST MOD 30 MIN: CPT | Performed by: FAMILY MEDICINE

## 2020-12-16 PROCEDURE — G0439 PPPS, SUBSEQ VISIT: HCPCS | Performed by: FAMILY MEDICINE

## 2020-12-16 RX ORDER — ATORVASTATIN CALCIUM 10 MG/1
10 TABLET, FILM COATED ORAL NIGHTLY
Qty: 90 TABLET | Refills: 1 | Status: SHIPPED | OUTPATIENT
Start: 2020-12-16 | End: 2021-06-28 | Stop reason: SDUPTHER

## 2020-12-16 RX ORDER — LISINOPRIL 20 MG/1
20 TABLET ORAL DAILY
Qty: 90 TABLET | Refills: 1 | Status: SHIPPED | OUTPATIENT
Start: 2020-12-16 | End: 2021-06-28 | Stop reason: SDUPTHER

## 2020-12-16 NOTE — PROGRESS NOTES
The ABCs of the Annual Wellness Visit  Subsequent Medicare Wellness Visit    Chief Complaint   Patient presents with   • Medicare Wellness-subsequent   • Hyperlipidemia   • Hypertension       Subjective   History of Present Illness:  Gab Maloney is a 85 y.o. male who presents for a Subsequent Medicare Wellness Visit.  He is also here to refill medicines.  Blood pressure control.  Lipids stable.  Nocturia stable.  Overall he feels well.    Preventative measures discussed during today's Medicare wellness visit.  He is due for Adacel Tdap which he will get at the pharmacy.    HEALTH RISK ASSESSMENT    Recent Hospitalizations:  No hospitalization(s) within the last year.    Current Medical Providers:  Patient Care Team:  Franck Moreno MD as PCP - General  Franck Moreno MD as PCP - Family Medicine    Smoking Status:  Social History     Tobacco Use   Smoking Status Former Smoker   • Years: 20.00   • Types: Cigarettes   Smokeless Tobacco Never Used   Tobacco Comment    quit age 35       Alcohol Consumption:  Social History     Substance and Sexual Activity   Alcohol Use Yes    Comment: RARELY       Depression Screen:   PHQ-2/PHQ-9 Depression Screening 12/16/2020   Little interest or pleasure in doing things 0   Feeling down, depressed, or hopeless 0   Trouble falling or staying asleep, or sleeping too much 0   Feeling tired or having little energy 0   Poor appetite or overeating 0   Feeling bad about yourself - or that you are a failure or have let yourself or your family down 0   Trouble concentrating on things, such as reading the newspaper or watching television 0   Moving or speaking so slowly that other people could have noticed. Or the opposite - being so fidgety or restless that you have been moving around a lot more than usual 0   Thoughts that you would be better off dead, or of hurting yourself in some way 0   Total Score 0       Fall Risk Screen:  STEADI Fall Risk Assessment was completed, and patient  is at LOW risk for falls.Assessment completed on:6/25/2020    Health Habits and Functional and Cognitive Screening:  Functional & Cognitive Status 12/16/2020   Do you have difficulty preparing food and eating? No   Do you have difficulty bathing yourself, getting dressed or grooming yourself? No   Do you have difficulty using the toilet? No   Do you have difficulty moving around from place to place? No   Do you have trouble with steps or getting out of a bed or a chair? No   Current Diet Well Balanced Diet   Dental Exam Up to date   Eye Exam Up to date   Exercise (times per week) 3 times per week   Current Exercises Include Walking   Current Exercise Activities Include -   Do you need help using the phone?  No   Are you deaf or do you have serious difficulty hearing?  No   Do you need help with transportation? No   Do you need help shopping? No   Do you need help preparing meals?  No   Do you need help with housework?  No   Do you need help with laundry? No   Do you need help taking your medications? No   Do you need help managing money? No   Do you ever drive or ride in a car without wearing a seat belt? No   Have you felt unusual stress, anger or loneliness in the last month? No   Who do you live with? Spouse   If you need help, do you have trouble finding someone available to you? No   Have you been bothered in the last four weeks by sexual problems? -   Do you have difficulty concentrating, remembering or making decisions? No         Does the patient have evidence of cognitive impairment? No    Asprin use counseling:Does not need ASA (and currently is not on it)    Age-appropriate Screening Schedule:  Refer to the list below for future screening recommendations based on patient's age, sex and/or medical conditions. Orders for these recommended tests are listed in the plan section. The patient has been provided with a written plan.    Health Maintenance   Topic Date Due   • TDAP/TD VACCINES (1 - Tdap) 11/19/1954  "  • LIPID PANEL  06/17/2021   • INFLUENZA VACCINE  Completed   • ZOSTER VACCINE  Completed          The following portions of the patient's history were reviewed and updated as appropriate: allergies, current medications, past family history, past medical history, past social history, past surgical history and problem list.    Outpatient Medications Prior to Visit   Medication Sig Dispense Refill   • Vitamin D, Cholecalciferol, 1000 UNITS capsule Take  by mouth.     • atorvastatin (LIPITOR) 10 MG tablet Take 1 tablet by mouth Every Night for 180 days. 90 tablet 1   • lisinopril (PRINIVIL,ZESTRIL) 20 MG tablet Take 1 tablet by mouth Daily for 180 days. 90 tablet 1     No facility-administered medications prior to visit.        Patient Active Problem List   Diagnosis   • Other hyperlipidemia   • Essential hypertension   • Elevated TSH   • Nocturia   • History of colon cancer       Advanced Care Planning:  ACP discussion was held with the patient during this visit. Patient has an advance directive (not in EMR), copy requested.    Review of Systems   Constitutional: Negative for fatigue.   Cardiovascular: Negative for chest pain.       Compared to one year ago, the patient feels his physical health is the same.  Compared to one year ago, the patient feels his mental health is the same.    Reviewed chart for potential of high risk medication in the elderly: yes  Reviewed chart for potential of harmful drug interactions in the elderly:yes    Objective         Vitals:    12/16/20 1035   BP: 120/75   Pulse: 65   Temp: 96.9 °F (36.1 °C)   Weight: 76.7 kg (169 lb)   Height: 172.7 cm (67.99\")       Body mass index is 25.7 kg/m².  Discussed the patient's BMI with him. The BMI is in the acceptable range.    Physical Exam  Vitals signs reviewed.   Constitutional:       General: He is not in acute distress.  Eyes:      General: Lids are normal.      Conjunctiva/sclera: Conjunctivae normal.   Neck:      Vascular: No carotid bruit. "      Trachea: No tracheal deviation.   Cardiovascular:      Rate and Rhythm: Normal rate and regular rhythm.      Heart sounds: Normal heart sounds. No murmur.   Pulmonary:      Effort: Pulmonary effort is normal.      Breath sounds: Normal breath sounds.   Skin:     General: Skin is warm and dry.   Neurological:      Mental Status: He is alert. He is not disoriented.   Psychiatric:         Speech: Speech normal.         Behavior: Behavior normal. Behavior is cooperative.               Assessment/Plan   Medicare Risks and Personalized Health Plan  CMS Preventative Services Quick Reference  Advance Directive Discussion  Diabetic Lab Screening   Fall Risk  Immunizations Discussed/Encouraged (specific immunizations; adacel Tdap )  Prostate Cancer Screening     The above risks/problems have been discussed with the patient.  Pertinent information has been shared with the patient in the After Visit Summary.  Follow up plans and orders are seen below in the Assessment/Plan Section.    Diagnoses and all orders for this visit:    1. Medicare annual wellness visit, subsequent (Primary)  Comments:  Information on fall prevention, immunizations and healthy exercise shared in AVS.  Patient to bring in living will for scanning    2. Other hyperlipidemia  Assessment & Plan:  Lipid abnormalities are unchanged.  Pharmacotherapy as ordered.  Lipids will be reassessed in 6 months.    Orders:  -     atorvastatin (LIPITOR) 10 MG tablet; Take 1 tablet by mouth Every Night for 180 days.  Dispense: 90 tablet; Refill: 1  -     Lipid Panel With LDL / HDL Ratio; Future  -     CK; Future    3. Essential hypertension  Assessment & Plan:  Hypertension is unchanged.  Continue current treatment regimen.  Blood pressure will be reassessed at the next regular appointment.    Orders:  -     lisinopril (PRINIVIL,ZESTRIL) 20 MG tablet; Take 1 tablet by mouth Daily for 180 days.  Dispense: 90 tablet; Refill: 1  -     Comprehensive Metabolic Panel;  Future  -     CBC & Differential; Future  -     TSH; Future    4. Elevated TSH  Assessment & Plan:  Check labs in 6 months    Orders:  -     TSH; Future    5. Nocturia  Assessment & Plan:  Nocturia symptoms are stable. PSA will be monitored with annual labs.       Orders:  -     PSA DIAGNOSTIC; Future    Follow Up:  Return in about 6 months (around 6/16/2021) for Recheck/Medication.     An After Visit Summary and PPPS were given to the patient.

## 2020-12-16 NOTE — PATIENT INSTRUCTIONS
Check on insurance coverage and cost for adacel Tdap(tetanus plus whooping cough vaccine) and get the immunization at your local pharmacy      Fall Prevention in the Home, Adult  Falls can cause injuries and can affect people from all age groups. There are many simple things that you can do to make your home safe and to help prevent falls. Ask for help when making these changes, if needed.  What actions can I take to prevent falls?  General instructions  · Use good lighting in all rooms. Replace any light bulbs that burn out.  · Turn on lights if it is dark. Use night-lights.  · Place frequently used items in easy-to-reach places. Lower the shelves around your home if necessary.  · Set up furniture so that there are clear paths around it. Avoid moving your furniture around.  · Remove throw rugs and other tripping hazards from the floor.  · Avoid walking on wet floors.  · Fix any uneven floor surfaces.  · Add color or contrast paint or tape to grab bars and handrails in your home. Place contrasting color strips on the first and last steps of stairways.  · When you use a stepladder, make sure that it is completely opened and that the sides are firmly locked. Have someone hold the ladder while you are using it. Do not climb a closed stepladder.  · Be aware of any and all pets.  What can I do in the bathroom?         · Keep the floor dry. Immediately clean up any water that spills onto the floor.  · Remove soap buildup in the tub or shower on a regular basis.  · Use non-skid mats or decals on the floor of the tub or shower.  · Attach bath mats securely with double-sided, non-slip rug tape.  · If you need to sit down while you are in the shower, use a plastic, non-slip stool.  · Install grab bars by the toilet and in the tub and shower. Do not use towel bars as grab bars.  What can I do in the bedroom?  · Make sure that a bedside light is easy to reach.  · Do not use oversized bedding that drapes onto the floor.  · Have  a firm chair that has side arms to use for getting dressed.  What can I do in the kitchen?  · Clean up any spills right away.  · If you need to reach for something above you, use a sturdy step stool that has a grab bar.  · Keep electrical cables out of the way.  · Do not use floor polish or wax that makes floors slippery. If you must use wax, make sure that it is non-skid floor wax.  What can I do in the stairways?  · Do not leave any items on the stairs.  · Make sure that you have a light switch at the top of the stairs and the bottom of the stairs. Have them installed if you do not have them.  · Make sure that there are handrails on both sides of the stairs. Fix handrails that are broken or loose. Make sure that handrails are as long as the stairways.  · Install non-slip stair treads on all stairs in your home.  · Avoid having throw rugs at the top or bottom of stairways, or secure the rugs with carpet tape to prevent them from moving.  · Choose a carpet design that does not hide the edge of steps on the stairway.  · Check any carpeting to make sure that it is firmly attached to the stairs. Fix any carpet that is loose or worn.  What can I do on the outside of my home?  · Use bright outdoor lighting.  · Regularly repair the edges of walkways and driveways and fix any cracks.  · Remove high doorway thresholds.  · Trim any shrubbery on the main path into your home.  · Regularly check that handrails are securely fastened and in good repair. Both sides of any steps should have handrails.  · Install guardrails along the edges of any raised decks or porches.  · Clear walkways of debris and clutter, including tools and rocks.  · Have leaves, snow, and ice cleared regularly.  · Use sand or salt on walkways during winter months.  · In the garage, clean up any spills right away, including grease or oil spills.  What other actions can I take?  · Wear closed-toe shoes that fit well and support your feet. Wear shoes that have  rubber soles or low heels.  · Use mobility aids as needed, such as canes, walkers, scooters, and crutches.  · Review your medicines with your health care provider. Some medicines can cause dizziness or changes in blood pressure, which increase your risk of falling.  Talk with your health care provider about other ways that you can decrease your risk of falls. This may include working with a physical therapist or  to improve your strength, balance, and endurance.  Where to find more information  · Centers for Disease Control and Prevention, STEADI: https://www.cdc.gov  · National [place] on Aging: https://hh8dqyl.kai.nih.gov  Contact a health care provider if:  · You are afraid of falling at home.  · You feel weak, drowsy, or dizzy at home.  · You fall at home.  Summary  · There are many simple things that you can do to make your home safe and to help prevent falls.  · Ways to make your home safe include removing tripping hazards and installing grab bars in the bathroom.  · Ask for help when making these changes in your home.  This information is not intended to replace advice given to you by your health care provider. Make sure you discuss any questions you have with your health care provider.  Document Revised: 11/30/2018 Document Reviewed: 08/02/2018  [employer] Patient Education © 2020 [employer]      Exercising to Stay Healthy  To become healthy and stay healthy, it is recommended that you do moderate-intensity and vigorous-intensity exercise. You can tell that you are exercising at a moderate intensity if your heart starts beating faster and you start breathing faster but can still hold a conversation. You can tell that you are exercising at a vigorous intensity if you are breathing much harder and faster and cannot hold a conversation while exercising.  Exercising regularly is important. It has many health benefits, such as:  · Improving overall fitness, flexibility, and endurance.  · Increasing bone  density.  · Helping with weight control.  · Decreasing body fat.  · Increasing muscle strength.  · Reducing stress and tension.  · Improving overall health.  How often should I exercise?  Choose an activity that you enjoy, and set realistic goals. Your health care provider can help you make an activity plan that works for you.  Exercise regularly as told by your health care provider. This may include:  · Doing strength training two times a week, such as:  ? Lifting weights.  ? Using resistance bands.  ? Push-ups.  ? Sit-ups.  ? Yoga.  · Doing a certain intensity of exercise for a given amount of time. Choose from these options:  ? A total of 150 minutes of moderate-intensity exercise every week.  ? A total of 75 minutes of vigorous-intensity exercise every week.  ? A mix of moderate-intensity and vigorous-intensity exercise every week.  Children, pregnant women, people who have not exercised regularly, people who are overweight, and older adults may need to talk with a health care provider about what activities are safe to do. If you have a medical condition, be sure to talk with your health care provider before you start a new exercise program.  What are some exercise ideas?  Moderate-intensity exercise ideas include:  · Walking 1 mile (1.6 km) in about 15 minutes.  · Biking.  · Hiking.  · Golfing.  · Dancing.  · Water aerobics.  Vigorous-intensity exercise ideas include:  · Walking 4.5 miles (7.2 km) or more in about 1 hour.  · Jogging or running 5 miles (8 km) in about 1 hour.  · Biking 10 miles (16.1 km) or more in about 1 hour.  · Lap swimming.  · Roller-skating or in-line skating.  · Cross-country skiing.  · Vigorous competitive sports, such as football, basketball, and soccer.  · Jumping rope.  · Aerobic dancing.  What are some everyday activities that can help me to get exercise?  · Yard work, such as:  ? Pushing a .  ? Raking and bagging leaves.  · Washing your car.  · Pushing a  stroller.  · Shoveling snow.  · Gardening.  · Washing windows or floors.  How can I be more active in my day-to-day activities?  · Use stairs instead of an elevator.  · Take a walk during your lunch break.  · If you drive, park your car farther away from your work or school.  · If you take public transportation, get off one stop early and walk the rest of the way.  · Stand up or walk around during all of your indoor phone calls.  · Get up, stretch, and walk around every 30 minutes throughout the day.  · Enjoy exercise with a friend. Support to continue exercising will help you keep a regular routine of activity.  What guidelines can I follow while exercising?  · Before you start a new exercise program, talk with your health care provider.  · Do not exercise so much that you hurt yourself, feel dizzy, or get very short of breath.  · Wear comfortable clothes and wear shoes with good support.  · Drink plenty of water while you exercise to prevent dehydration or heat stroke.  · Work out until your breathing and your heartbeat get faster.  Where to find more information  · [place] Department of Health and Human Services: www.hhs.gov  · Centers for Disease Control and Prevention (CDC): www.cdc.gov  Summary  · Exercising regularly is important. It will improve your overall fitness, flexibility, and endurance.  · Regular exercise also will improve your overall health. It can help you control your weight, reduce stress, and improve your bone density.  · Do not exercise so much that you hurt yourself, feel dizzy, or get very short of breath.  · Before you start a new exercise program, talk with your health care provider.  This information is not intended to replace advice given to you by your health care provider. Make sure you discuss any questions you have with your health care provider.  Document Revised: 11/30/2018 Document Reviewed: 11/08/2018  [employer]vier Patient Education © 2020 [employer]

## 2021-06-28 ENCOUNTER — OFFICE VISIT (OUTPATIENT)
Dept: FAMILY MEDICINE CLINIC | Facility: CLINIC | Age: 86
End: 2021-06-28

## 2021-06-28 VITALS
HEIGHT: 68 IN | OXYGEN SATURATION: 96 % | TEMPERATURE: 97 F | BODY MASS INDEX: 25.46 KG/M2 | SYSTOLIC BLOOD PRESSURE: 123 MMHG | HEART RATE: 63 BPM | DIASTOLIC BLOOD PRESSURE: 74 MMHG | WEIGHT: 168 LBS | RESPIRATION RATE: 16 BRPM

## 2021-06-28 DIAGNOSIS — R79.89 ELEVATED TSH: ICD-10-CM

## 2021-06-28 DIAGNOSIS — E87.5 SERUM POTASSIUM ELEVATED: ICD-10-CM

## 2021-06-28 DIAGNOSIS — E78.49 OTHER HYPERLIPIDEMIA: ICD-10-CM

## 2021-06-28 DIAGNOSIS — I10 ESSENTIAL HYPERTENSION: Primary | ICD-10-CM

## 2021-06-28 DIAGNOSIS — R35.1 NOCTURIA: ICD-10-CM

## 2021-06-28 PROCEDURE — 99214 OFFICE O/P EST MOD 30 MIN: CPT | Performed by: FAMILY MEDICINE

## 2021-06-28 RX ORDER — ATORVASTATIN CALCIUM 10 MG/1
10 TABLET, FILM COATED ORAL NIGHTLY
Qty: 90 TABLET | Refills: 1 | Status: SHIPPED | OUTPATIENT
Start: 2021-06-28 | End: 2021-11-29 | Stop reason: SDUPTHER

## 2021-06-28 RX ORDER — LISINOPRIL 20 MG/1
20 TABLET ORAL DAILY
Qty: 90 TABLET | Refills: 1 | Status: SHIPPED | OUTPATIENT
Start: 2021-06-28 | End: 2021-11-29 | Stop reason: SDUPTHER

## 2021-06-29 LAB — POTASSIUM SERPL-SCNC: 4.7 MMOL/L (ref 3.5–5.2)

## 2021-06-30 ENCOUNTER — TELEPHONE (OUTPATIENT)
Dept: FAMILY MEDICINE CLINIC | Facility: CLINIC | Age: 86
End: 2021-06-30

## 2021-06-30 NOTE — TELEPHONE ENCOUNTER
PATIENT CALLED DR. MCCLURE ASSISTANT BACK.  PLEASE CALL BACK  [phone]  ATTEMPTED WARM TRANSFER

## 2021-11-29 ENCOUNTER — TELEPHONE (OUTPATIENT)
Dept: FAMILY MEDICINE CLINIC | Facility: CLINIC | Age: 86
End: 2021-11-29

## 2021-11-29 DIAGNOSIS — E78.49 OTHER HYPERLIPIDEMIA: ICD-10-CM

## 2021-11-29 DIAGNOSIS — I10 ESSENTIAL HYPERTENSION: ICD-10-CM

## 2021-11-29 RX ORDER — ATORVASTATIN CALCIUM 10 MG/1
10 TABLET, FILM COATED ORAL NIGHTLY
Qty: 90 TABLET | Refills: 1 | Status: SHIPPED | OUTPATIENT
Start: 2021-11-29 | End: 2022-06-23 | Stop reason: SDUPTHER

## 2021-11-29 RX ORDER — LISINOPRIL 20 MG/1
20 TABLET ORAL DAILY
Qty: 90 TABLET | Refills: 1 | Status: SHIPPED | OUTPATIENT
Start: 2021-11-29 | End: 2022-06-23 | Stop reason: SDUPTHER

## 2021-11-29 NOTE — TELEPHONE ENCOUNTER
I returned pt call two times pt did not answer phone kept ringing.    *Pt and pt wife can be scheduled same day, but 15 minutes apart from each other.  Ok to schedule that way Thanks

## 2021-11-29 NOTE — TELEPHONE ENCOUNTER
Caller: April Maloney    Relationship: Emergency Contact    Best call back number: [phone]     What is the best time to reach you: ANY    Who are you requesting to speak with (clinical staff, provider,  specific staff member): CLINICAL      What was the call regarding: WIFE AND   WANT TO COME ON THE SAME DAY.  [facility] WILL NOT ALLOW HUB TO SCHEDULE BOTH PATIENTS ON THE SAME DAY AS A SME.  PLEASE ADVISE    Do you require a callback: YES

## 2021-12-23 ENCOUNTER — PRIOR AUTHORIZATION (OUTPATIENT)
Dept: FAMILY MEDICINE CLINIC | Facility: CLINIC | Age: 86
End: 2021-12-23

## 2021-12-23 ENCOUNTER — OFFICE VISIT (OUTPATIENT)
Dept: FAMILY MEDICINE CLINIC | Facility: CLINIC | Age: 86
End: 2021-12-23

## 2021-12-23 VITALS
BODY MASS INDEX: 26.22 KG/M2 | RESPIRATION RATE: 16 BRPM | TEMPERATURE: 97.5 F | DIASTOLIC BLOOD PRESSURE: 62 MMHG | HEART RATE: 65 BPM | SYSTOLIC BLOOD PRESSURE: 119 MMHG | HEIGHT: 68 IN | WEIGHT: 173 LBS | OXYGEN SATURATION: 96 %

## 2021-12-23 DIAGNOSIS — M54.42 ACUTE LEFT-SIDED LOW BACK PAIN WITH LEFT-SIDED SCIATICA: ICD-10-CM

## 2021-12-23 DIAGNOSIS — Z00.00 MEDICARE ANNUAL WELLNESS VISIT, SUBSEQUENT: Primary | ICD-10-CM

## 2021-12-23 PROCEDURE — 99214 OFFICE O/P EST MOD 30 MIN: CPT | Performed by: FAMILY MEDICINE

## 2021-12-23 PROCEDURE — 1160F RVW MEDS BY RX/DR IN RCRD: CPT | Performed by: FAMILY MEDICINE

## 2021-12-23 PROCEDURE — G0439 PPPS, SUBSEQ VISIT: HCPCS | Performed by: FAMILY MEDICINE

## 2021-12-23 PROCEDURE — 1170F FXNL STATUS ASSESSED: CPT | Performed by: FAMILY MEDICINE

## 2021-12-23 RX ORDER — METHYLPREDNISOLONE 4 MG/1
TABLET ORAL
Qty: 21 TABLET | Refills: 0 | Status: SHIPPED | OUTPATIENT
Start: 2021-12-23 | End: 2021-12-29

## 2021-12-23 RX ORDER — CYCLOBENZAPRINE HCL 5 MG
5 TABLET ORAL NIGHTLY PRN
Qty: 15 TABLET | Refills: 0 | Status: SHIPPED | OUTPATIENT
Start: 2021-12-23 | End: 2022-01-07

## 2021-12-23 NOTE — TELEPHONE ENCOUNTER
Instant APPROVAL    [place] Case: [id number], Status: Approved, Coverage Starts on: 9/23/2021 12:00:00 AM, Coverage Ends on: 12/23/2022 12:00:00 AM

## 2021-12-23 NOTE — PATIENT INSTRUCTIONS
You are due for adacel Tdap vaccination. (provides protection against tetanus, diptheria and whooping cough) Please  get the immunization at your local pharmacy at your earliest convenience. This immunization will next be due in 10 years. Please click on the link for more information about this vaccine.    [facility] Tdap Vaccine Information      Medicare Wellness  Personal Prevention Plan of Service     Date of Office Visit:    Encounter Provider:  Franck Moreno MD  Place of Service:  [facility]  Patient Name: Gab Maloney  :  1935    As part of the Medicare Wellness portion of your visit today, we are providing you with this personalized preventive plan of services (PPPS). This plan is based upon recommendations of the [place] Preventive Services Task Force (USPSTF) and the Advisory Committee on Immunization Practices (ACIP).    This lists the preventive care services that should be considered, and provides dates of when you are due. Items listed as completed are up-to-date and do not require any further intervention.    Health Maintenance   Topic Date Due   • TDAP/TD VACCINES (1 - Tdap) Never done   • LIPID PANEL  2022   • ANNUAL WELLNESS VISIT  2022   • COVID-19 Vaccine  Completed   • INFLUENZA VACCINE  Completed   • Pneumococcal Vaccine 65+  Completed   • ZOSTER VACCINE  Completed       No orders of the defined types were placed in this encounter.      No follow-ups on file.

## 2021-12-23 NOTE — PROGRESS NOTES
The ABCs of the Annual Wellness Visit  Subsequent Medicare Wellness Visit    Chief Complaint   Patient presents with   • Medicare Wellness-subsequent      Subjective    History of Present Illness:  Gab Maloney is a 86 y.o. male who presents for a Subsequent Medicare Wellness Visit.  Today he has a complaint of low back pain which has been present for about 3 weeks.  No known injury.  The pain starts in the left low back and travels down the back of his left leg to the lateral aspect of his knee.  Pain is achy in nature and intensity is 7 out of 10.  Coughing makes the pain worse.  No fever sweats or chills.    Preventative measures discussed during today's Medicare wellness.  He is due for Adacel Tdap.  He will bring in copy of living will for scanning into the electronic medical record.    The following portions of the patient's history were reviewed and   updated as appropriate: allergies, current medications, past family history, past medical history, past social history, past surgical history and problem list.    Compared to one year ago, the patient feels his physical   health is worse.    Compared to one year ago, the patient feels his mental   health is the same.    Recent Hospitalizations:  He was not admitted to the hospital during the last year.       Current Medical Providers:  Patient Care Team:  Franck Moreno MD as PCP - General  Franck Moreno MD as PCP - Family Medicine    Outpatient Medications Prior to Visit   Medication Sig Dispense Refill   • atorvastatin (LIPITOR) 10 MG tablet Take 1 tablet by mouth Every Night for 180 days. 90 tablet 1   • lisinopril (PRINIVIL,ZESTRIL) 20 MG tablet Take 1 tablet by mouth Daily for 180 days. 90 tablet 1   • Vitamin D, Cholecalciferol, 1000 UNITS capsule Take  by mouth.       No facility-administered medications prior to visit.       No opioid medication identified on active medication list. I have reviewed chart for other potential  high risk medication/s  "and harmful drug interactions in the elderly.          Aspirin is not on active medication list.  Aspirin use is contraindicated for this patient due to: history of aspirin allergy.  .    Patient Active Problem List   Diagnosis   • Other hyperlipidemia   • Essential hypertension   • Elevated TSH   • Nocturia   • History of colon cancer   • Acute left-sided low back pain with left-sided sciatica   • History of poliomyelitis     Advance Care Planning  Advance Directive is not on file.  ACP discussion was held with the patient during this visit. Patient has an advance directive (not in EMR), copy requested.          Objective    Vitals:    12/23/21 0757   BP: 119/62   BP Location: Left arm   Patient Position: Sitting   Cuff Size: Adult   Pulse: 65   Resp: 16   Temp: 97.5 °F (36.4 °C)   SpO2: 96%   Weight: 78.5 kg (173 lb)   Height: 172.7 cm (67.99\")     BMI Readings from Last 1 Encounters:   12/23/21 26.31 kg/m²   BMI is above normal parameters. Recommendations include: exercise counseling and nutrition counseling    Does the patient have evidence of cognitive impairment? No    Physical Exam  Vitals reviewed.   Constitutional:       General: He is not in acute distress.  Eyes:      General: Lids are normal.      Conjunctiva/sclera: Conjunctivae normal.   Neck:      Vascular: No carotid bruit.      Trachea: No tracheal deviation.   Cardiovascular:      Rate and Rhythm: Normal rate and regular rhythm.      Heart sounds: Normal heart sounds. No murmur heard.      Pulmonary:      Effort: Pulmonary effort is normal.      Breath sounds: Normal breath sounds.   Musculoskeletal:      Lumbar back: Decreased range of motion.        Back:       Comments: Positive straight leg raising left leg   Skin:     General: Skin is warm and dry.   Neurological:      Mental Status: He is alert. He is not disoriented.   Psychiatric:         Speech: Speech normal.         Behavior: Behavior normal. Behavior is cooperative.           "       HEALTH RISK ASSESSMENT    Smoking Status:  Social History     Tobacco Use   Smoking Status Former Smoker   • Years: 20.00   • Types: Cigarettes   Smokeless Tobacco Never Used   Tobacco Comment    quit age 35     Alcohol Consumption:  Social History     Substance and Sexual Activity   Alcohol Use Yes    Comment: RARELY     Fall Risk Screen:    OLGA Fall Risk Assessment was completed, and patient is at LOW risk for falls.Assessment completed on:12/23/2021    Depression Screening:  PHQ-2/PHQ-9 Depression Screening 12/23/2021   Little interest or pleasure in doing things 0   Feeling down, depressed, or hopeless 0   Trouble falling or staying asleep, or sleeping too much 0   Feeling tired or having little energy 0   Poor appetite or overeating 0   Feeling bad about yourself - or that you are a failure or have let yourself or your family down 0   Trouble concentrating on things, such as reading the newspaper or watching television 0   Moving or speaking so slowly that other people could have noticed. Or the opposite - being so fidgety or restless that you have been moving around a lot more than usual 0   Thoughts that you would be better off dead, or of hurting yourself in some way 0   Total Score 0   If you checked off any problems, how difficult have these problems made it for you to do your work, take care of things at home, or get along with other people? Not difficult at all       Health Habits and Functional and Cognitive Screening:  Functional & Cognitive Status 12/23/2021   Do you have difficulty preparing food and eating? No   Do you have difficulty bathing yourself, getting dressed or grooming yourself? No   Do you have difficulty using the toilet? No   Do you have difficulty moving around from place to place? No   Do you have trouble with steps or getting out of a bed or a chair? No   Current Diet Well Balanced Diet   Dental Exam Up to date   Eye Exam Up to date   Exercise (times per week) 7 times per  week   Current Exercises Include Walking   Current Exercise Activities Include -   Do you need help using the phone?  No   Are you deaf or do you have serious difficulty hearing?  No   Do you need help with transportation? No   Do you need help shopping? No   Do you need help preparing meals?  No   Do you need help with housework?  No   Do you need help with laundry? No   Do you need help taking your medications? No   Do you need help managing money? No   Do you ever drive or ride in a car without wearing a seat belt? No   Have you felt unusual stress, anger or loneliness in the last month? No   Who do you live with? Spouse   If you need help, do you have trouble finding someone available to you? No   Have you been bothered in the last four weeks by sexual problems? No   Do you have difficulty concentrating, remembering or making decisions? No       Age-appropriate Screening Schedule:  Refer to the list below for future screening recommendations based on patient's age, sex and/or medical conditions. Orders for these recommended tests are listed in the plan section. The patient has been provided with a written plan.    Health Maintenance   Topic Date Due   • TDAP/TD VACCINES (1 - Tdap) Never done   • LIPID PANEL  06/16/2022   • INFLUENZA VACCINE  Completed   • ZOSTER VACCINE  Completed              Assessment/Plan   CMS Preventative Services Quick Reference  Risk Factors Identified During Encounter  Immunizations Discussed/Encouraged (specific Immunizations; Tdap  The above risks/problems have been discussed with the patient.  Follow up actions/plans if indicated are seen below in the Assessment/Plan Section.  Pertinent information has been shared with the patient in the After Visit Summary.    Diagnoses and all orders for this visit:    1. Medicare annual wellness visit, subsequent (Primary)  Comments:  Continue with healthy diet and exercise.  Information on immunization shared.  Patient to bring in living will for  scanning.    2. Acute left-sided low back pain with left-sided sciatica  Assessment & Plan:  New problem.  Will treat with course of Medrol Dosepak and muscle relaxers at bedtime as needed.  If symptoms last greater than 3 weeks he will make an appointment for reevaluation.    Orders:  -     methylPREDNISolone (MEDROL) 4 MG dose pack; Take as directed on package instructions.  Dispense: 21 tablet; Refill: 0  -     cyclobenzaprine (FLEXERIL) 5 MG tablet; Take 1 tablet by mouth At Night As Needed for Muscle Spasms for up to 15 days. Avoid with driving. Do not use alcohol with this prescription  Dispense: 15 tablet; Refill: 0      Follow Up:   Return in about 6 months (around 6/23/2022) for recheck/refill medication.     An After Visit Summary and PPPS were made available to the patient.

## 2021-12-23 NOTE — ASSESSMENT & PLAN NOTE
New problem.  Will treat with course of Medrol Dosepak and muscle relaxers at bedtime as needed.  If symptoms last greater than 3 weeks he will make an appointment for reevaluation.

## 2022-01-10 ENCOUNTER — TELEPHONE (OUTPATIENT)
Dept: FAMILY MEDICINE CLINIC | Facility: CLINIC | Age: 87
End: 2022-01-10

## 2022-01-10 NOTE — TELEPHONE ENCOUNTER
Caller: April Maloney    Relationship: Emergency Contact    Best call back number: [phone]    What orders are you requesting (i.e. lab or imaging): X RAY ON LEFT HIP     In what timeframe would the patient need to come in: AS SOON AS POSSIBLE    Where will you receive your lab/imaging services:  [facility]    Additional notes: PAIN MEDICATIONS LAST TIME DID NOT HELP WITH PAIN

## 2022-01-11 ENCOUNTER — OFFICE VISIT (OUTPATIENT)
Dept: FAMILY MEDICINE CLINIC | Facility: CLINIC | Age: 87
End: 2022-01-11

## 2022-01-11 VITALS
HEIGHT: 68 IN | TEMPERATURE: 98.1 F | DIASTOLIC BLOOD PRESSURE: 75 MMHG | SYSTOLIC BLOOD PRESSURE: 136 MMHG | OXYGEN SATURATION: 99 % | BODY MASS INDEX: 25.76 KG/M2 | WEIGHT: 170 LBS | HEART RATE: 61 BPM | RESPIRATION RATE: 16 BRPM

## 2022-01-11 DIAGNOSIS — G89.29 CHRONIC BILATERAL LOW BACK PAIN WITH LEFT-SIDED SCIATICA: ICD-10-CM

## 2022-01-11 DIAGNOSIS — M54.42 CHRONIC BILATERAL LOW BACK PAIN WITH LEFT-SIDED SCIATICA: ICD-10-CM

## 2022-01-11 DIAGNOSIS — M54.42 ACUTE LEFT-SIDED LOW BACK PAIN WITH LEFT-SIDED SCIATICA: Primary | ICD-10-CM

## 2022-01-11 PROCEDURE — 99213 OFFICE O/P EST LOW 20 MIN: CPT | Performed by: NURSE PRACTITIONER

## 2022-01-11 PROCEDURE — 72110 X-RAY EXAM L-2 SPINE 4/>VWS: CPT | Performed by: NURSE PRACTITIONER

## 2022-01-11 NOTE — PROGRESS NOTES
Chief Complaint  Sciaticchristina De Guzman presents to [facility]     86 year old male presented to the clinic c/o some ongoing low back/ left hip/leg pain.  HE was seen in the office for his annual physical 12/21 and started on a steroid pack and flexeril to see if he would have any benefit.  He denies any help with pain.  States the pain is sharp and shooting down his left leg.  It is worse when he sits in the same position to long or with bending.      He has an active problem list of the following  Patient Active Problem List   Diagnosis   • Other hyperlipidemia   • Essential hypertension   • Elevated TSH   • Nocturia   • History of colon cancer   • Acute left-sided low back pain with left-sided sciatica   • History of poliomyelitis     He has been compliant with the following medications  Current Outpatient Medications on File Prior to Visit   Medication Sig Dispense Refill   • atorvastatin (LIPITOR) 10 MG tablet Take 1 tablet by mouth Every Night for 180 days. 90 tablet 1   • lisinopril (PRINIVIL,ZESTRIL) 20 MG tablet Take 1 tablet by mouth Daily for 180 days. 90 tablet 1   • Vitamin D, Cholecalciferol, 1000 UNITS capsule Take  by mouth.       No current facility-administered medications on file prior to visit.     He denies any medication side effects    The following portions of the patient's history were reviewed and updated as appropriate: allergies, current medications, past family history, past medical history, past social history, past surgical history, and problem list       Review of Systems   Constitutional: Negative for fatigue and fever.   Eyes: Negative for visual disturbance.   Respiratory: Negative for cough and shortness of breath.    Cardiovascular: Negative for chest pain, palpitations and leg swelling.   Musculoskeletal: Positive for arthralgias and myalgias.   Neurological: Negative for dizziness and light-headedness.        Objective   Vital  "Signs:   Vitals:    01/11/22 1423   BP: 136/75   Pulse: 61   Resp: 16   Temp: 98.1 °F (36.7 °C)   TempSrc: Tympanic   SpO2: 99%   Weight: 77.1 kg (170 lb)   Height: 172.7 cm (67.99\")   PainSc:   8   PainLoc: Leg        Physical Exam  Vitals reviewed.   Constitutional:       Appearance: Normal appearance. He is not ill-appearing.   HENT:      Head: Normocephalic.   Cardiovascular:      Rate and Rhythm: Normal rate and regular rhythm.      Pulses: Normal pulses.      Heart sounds: Normal heart sounds. No murmur heard.      Pulmonary:      Effort: Pulmonary effort is normal.      Breath sounds: Normal breath sounds.   Musculoskeletal:      Lumbar back: No spasms or tenderness. Positive left straight leg raise test.        Back:       Comments: Mild tenderness left gluteal  Decreased ROM   Skin:     General: Skin is warm.   Neurological:      Mental Status: He is alert and oriented to person, place, and time.   Psychiatric:         Mood and Affect: Mood normal.         Behavior: Behavior normal.         Thought Content: Thought content normal.         Judgment: Judgment normal.          Result Review :     The following data was reviewed by: EMILY Rhodes on 01/11/2022:  XR Spine Lumbar 4+ View (In Office) (01/11/2022 14:40)           Assessment and Plan    Diagnoses and all orders for this visit:    1. Acute left-sided low back pain with left-sided sciatica (Primary)  -     XR Spine Lumbar 4+ View (In Office)  -     Ambulatory Referral to Pain Management    plan  Xrays today-  Will have radiologist review  Pt does not want to have back surgery -    He would like to discuss pain management -  Has allergy severe to NSAIDS and is limited   Will get further imaging if needed to pain management    Follow Up   Return if symptoms worsen or fail to improve.  Patient was given instructions and counseling regarding his condition or for health maintenance advice. Please see specific information pulled into the AVS if " appropriate.

## 2022-01-24 ENCOUNTER — TELEPHONE (OUTPATIENT)
Dept: FAMILY MEDICINE CLINIC | Facility: CLINIC | Age: 87
End: 2022-01-24

## 2022-01-24 DIAGNOSIS — M50.30 DDD (DEGENERATIVE DISC DISEASE), CERVICAL: Primary | ICD-10-CM

## 2022-01-24 NOTE — TELEPHONE ENCOUNTER
Spoke with pt and wife-  Reviewed xray results.  Request for MRI from Pain management  Wife is encouraging him to try PT-  Order placed

## 2022-01-24 NOTE — TELEPHONE ENCOUNTER
Caller: Gab Maloney    Relationship: Self    Best call back number: [phone]    Caller requesting test results: PATIENT     What test was performed: X-RAY OF SPINE    When was the test performed: 1-11-22    Where was the test performed: OUR OFFICE     Additional notes:

## 2022-02-28 ENCOUNTER — HOSPITAL ENCOUNTER (OUTPATIENT)
Dept: MRI IMAGING | Facility: HOSPITAL | Age: 87
Discharge: HOME | End: 2022-02-28
Admitting: NURSE PRACTITIONER

## 2022-02-28 ENCOUNTER — TELEPHONE (OUTPATIENT)
Dept: FAMILY MEDICINE CLINIC | Facility: CLINIC | Age: 87
End: 2022-02-28

## 2022-02-28 DIAGNOSIS — M54.42 ACUTE LEFT-SIDED LOW BACK PAIN WITH LEFT-SIDED SCIATICA: ICD-10-CM

## 2022-02-28 DIAGNOSIS — M54.42 CHRONIC BILATERAL LOW BACK PAIN WITH LEFT-SIDED SCIATICA: ICD-10-CM

## 2022-02-28 DIAGNOSIS — G89.29 CHRONIC BILATERAL LOW BACK PAIN WITH LEFT-SIDED SCIATICA: ICD-10-CM

## 2022-02-28 PROCEDURE — 72148 MRI LUMBAR SPINE W/O DYE: CPT

## 2022-02-28 NOTE — TELEPHONE ENCOUNTER
Caller: Gab Maloney    Relationship: Self    Best call back number: [phone]    What is the medical concern/diagnosis: BACK PAIN    What specialty or service is being requested: PAIN MANAGEMENT     What is the provider, practice or medical service name: [facility]     What is the office location: [facility]    What is the office phone number:     Any additional details:

## 2022-03-02 NOTE — TELEPHONE ENCOUNTER
PATIENT IS CALLING IN STATING THAT HE HAD HIS MRI ON 02/28/22 AND IS WANTING TO BE CONTACTED WITH THE RESULTS AND WANTS TO KNOW IF HE CAN NOW GET REFERRED FOR PAIN MANAGEMENT.    PATIENT CALL BACK  [phone]  OK TO LEAVE A MESSAGE

## 2022-03-03 ENCOUNTER — TELEPHONE (OUTPATIENT)
Dept: FAMILY MEDICINE CLINIC | Facility: CLINIC | Age: 87
End: 2022-03-03

## 2022-03-03 DIAGNOSIS — M54.42 ACUTE LEFT-SIDED LOW BACK PAIN WITH LEFT-SIDED SCIATICA: ICD-10-CM

## 2022-03-03 DIAGNOSIS — M51.36 DDD (DEGENERATIVE DISC DISEASE), LUMBAR: Primary | ICD-10-CM

## 2022-03-03 NOTE — TELEPHONE ENCOUNTER
Pts Spouse called requesting you call her about his MRI. She wants to get a better understanding of what is going on with him.

## 2022-03-04 RX ORDER — ALLOPURINOL 100 MG/1
TABLET ORAL
Qty: 90 TABLET | Refills: 0 | Status: SHIPPED | OUTPATIENT
Start: 2022-03-04 | End: 2022-03-04

## 2022-03-14 ENCOUNTER — OFFICE VISIT (OUTPATIENT)
Dept: FAMILY MEDICINE CLINIC | Facility: CLINIC | Age: 87
End: 2022-03-14

## 2022-03-14 ENCOUNTER — TELEPHONE (OUTPATIENT)
Dept: FAMILY MEDICINE CLINIC | Facility: CLINIC | Age: 87
End: 2022-03-14

## 2022-03-14 VITALS
HEIGHT: 68 IN | TEMPERATURE: 97.7 F | OXYGEN SATURATION: 98 % | SYSTOLIC BLOOD PRESSURE: 134 MMHG | HEART RATE: 70 BPM | BODY MASS INDEX: 26.22 KG/M2 | DIASTOLIC BLOOD PRESSURE: 78 MMHG | WEIGHT: 173 LBS

## 2022-03-14 DIAGNOSIS — M54.42 CHRONIC BILATERAL LOW BACK PAIN WITH LEFT-SIDED SCIATICA: Primary | ICD-10-CM

## 2022-03-14 DIAGNOSIS — G89.29 CHRONIC BILATERAL LOW BACK PAIN WITH LEFT-SIDED SCIATICA: Primary | ICD-10-CM

## 2022-03-14 DIAGNOSIS — M51.36 DDD (DEGENERATIVE DISC DISEASE), LUMBAR: ICD-10-CM

## 2022-03-14 PROCEDURE — 99214 OFFICE O/P EST MOD 30 MIN: CPT | Performed by: NURSE PRACTITIONER

## 2022-03-14 RX ORDER — DULOXETIN HYDROCHLORIDE 30 MG/1
30 CAPSULE, DELAYED RELEASE ORAL NIGHTLY
Qty: 30 CAPSULE | Refills: 1 | Status: SHIPPED | OUTPATIENT
Start: 2022-03-14 | End: 2022-06-23 | Stop reason: SDUPTHER

## 2022-03-14 RX ORDER — DULOXETIN HYDROCHLORIDE 30 MG/1
30 CAPSULE, DELAYED RELEASE ORAL NIGHTLY
Qty: 30 CAPSULE | Refills: 1 | Status: SHIPPED | OUTPATIENT
Start: 2022-03-14 | End: 2022-03-14 | Stop reason: SDUPTHER

## 2022-03-14 NOTE — TELEPHONE ENCOUNTER
I talked to patient . Pt told would need to schedule an appointment for further evaluation dr garcias advised . Patient to call back and schedule an appointment

## 2022-03-14 NOTE — TELEPHONE ENCOUNTER
Caller: April Maloney    Relationship: Emergency Contact    Best call back number: [phone]     What medication are you requesting: PAIN MEDICATION     What are your current symptoms: BACK  PAIN     How long have you been experiencing symptoms: SINCE December     Have you had these symptoms before:    [x] Yes  [] No    Have you been treated for these symptoms before:   [x] Yes  [] No    If a prescription is needed, what is your preferred pharmacy and phone number: [address] [phone] [facility] [phone] [facility]     Additional notes: PATIENT IS ALLERGIC TO ASPRIN , PATIENT STATED DOSE PACK DIDN'T WORK  AND HAS BEEN TAKING TYLENOL  AND NOT WORKING . PATIENT HAD MRI 2 WEEKS AGO . PATIENT WANTS AN UPDATE ON PAIN MANAGEMENT . PLEASE CALL PATIENT AND ADVISE .

## 2022-05-25 NOTE — PROGRESS NOTES
Subjective   Patient ID: Gab Maloney is a 86 y.o. 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 Patient had a MRI L-spine done on 02.28.2022 at [facility].     Treatment:    Today he reports left sided low back pain with radiating leg pain. He has some numbness and tingling. He has tried tylenol for pain, nothing else further.     Patient, Provider, and MA are all wearing a mask in our office today.     History of Present Illness     Last November this patient began having pain in the left side of his lower back with radiation into his left leg.  He has a pain in the right hip as well but most of the pain is on the left side in his lower back and down the left leg.  He has no difficulty with bowel bladder control or other associated symptoms.  He has been treated so far with 2 injections which helped but then the pain started to come back a little bit.  He still cannot walk very far even with a constant pain is better.  When he walks any distance at all he has to stop.    The following portions of the patient's history were reviewed and updated as appropriate: allergies, current medications, past family history, past medical history, past social history, past surgical history and problem list.    Review of Systems   Constitutional: Negative for activity change.   HENT: Negative for congestion.    Eyes: Negative for visual disturbance.   Respiratory: Negative for chest tightness and shortness of breath.    Cardiovascular: Negative for chest pain.   Gastrointestinal: Negative for nausea and vomiting.   Endocrine: Negative for cold intolerance and heat intolerance.   Genitourinary: Negative for difficulty urinating.   Musculoskeletal: Positive for back pain.   Skin: Negative for rash and wound.   Allergic/Immunologic: Negative for environmental allergies.   Neurological: Positive for numbness.   Hematological: Does not  "bruise/bleed easily.   Psychiatric/Behavioral: Negative for sleep disturbance.       I have reviewed the review of systems as documented by my MA.      Objective     Vitals:    05/31/22 1425   BP: 130/70   Pulse: 63   Temp: 98.1 °F (36.7 °C)   SpO2: 97%   Weight: 78.6 kg (173 lb 4.5 oz)   Height: 172.7 cm (67.99\")     Body mass index is 26.36 kg/m².      Physical Exam  Constitutional:       Appearance: He is well-developed.   HENT:      Head: Normocephalic and atraumatic.   Eyes:      Extraocular Movements: EOM normal.      Conjunctiva/sclera: Conjunctivae normal.      Pupils: Pupils are equal, round, and reactive to light.   Neck:      Vascular: No carotid bruit.   Neurological:      Mental Status: He is oriented to person, place, and time.      Coordination: Finger-Nose-Finger Test and Heel to Shin Test normal.      Gait: Gait is intact.      Deep Tendon Reflexes:      Reflex Scores:       Tricep reflexes are 2+ on the right side and 2+ on the left side.       Bicep reflexes are 2+ on the right side and 2+ on the left side.       Brachioradialis reflexes are 2+ on the right side and 2+ on the left side.       Patellar reflexes are 2+ on the right side and 2+ on the left side.       Achilles reflexes are 2+ on the right side and 2+ on the left side.  Psychiatric:         Speech: Speech normal.       Neurologic Exam     Mental Status   Oriented to person, place, and time.   Registration of memory: Good recent and remote memory.   Attention: normal. Concentration: normal.   Speech: speech is normal   Level of consciousness: alert  Knowledge: consistent with education.     Cranial Nerves     CN II   Visual fields full to confrontation.   Visual acuity: normal    CN III, IV, VI   Pupils are equal, round, and reactive to light.  Extraocular motions are normal.     CN V   Facial sensation intact.   Right corneal reflex: normal  Left corneal reflex: normal    CN VII   Facial expression full, symmetric.   Right facial " weakness: none  Left facial weakness: none    CN VIII   Hearing: intact    CN IX, X   Palate: symmetric    CN XI   Right sternocleidomastoid strength: normal  Left sternocleidomastoid strength: normal    CN XII   Tongue: not atrophic  Tongue deviation: none    Motor Exam   Muscle bulk: normal  Right arm tone: normal  Left arm tone: normal  Right leg tone: normal  Left leg tone: normal    Strength   Strength 5/5 except as noted.     Sensory Exam   Light touch normal.     Gait, Coordination, and Reflexes     Gait  Gait: normal    Coordination   Finger to nose coordination: normal  Heel to shin coordination: normal    Reflexes   Right brachioradialis: 2+  Left brachioradialis: 2+  Right biceps: 2+  Left biceps: 2+  Right triceps: 2+  Left triceps: 2+  Right patellar: 2+  Left patellar: 2+  Right achilles: 2+  Left achilles: 2+  Right : 2+  Left : 2+          Assessment & Plan   Independent Review of Radiographic Studies:      I personally reviewed the images from the following studies.    I reviewed an MRI of his lumbar spine done on 28 February of this year.  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  On the axial images T12-L1 is widely open as is L1-2.  L2-3 shows a little disc bulging but is still mostly open.  L3-4 shows some right lateral recess stenosis.  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  L5-S1 mostly looks okay and there is a partially lumbarized first sacral vertebrae which looks okay.    Medical Decision Making:      I told the patient and his wife about the imaging.  I told him that the problem was coming from the synovial cyst in the stenosis.  I told him the only way we can fix stenosis is with surgery which is not a very good idea on someone 86 years old.  I suggested that before we get too aggressive we just try some physical therapy as a next step.  I told her to call me after the therapy and let me know how is doing.  If he  is still having problems then we will consider getting more aggressive.    Diagnoses and all orders for this visit:    1. Acute left-sided low back pain with left-sided sciatica (Primary)  -     Ambulatory Referral to Physical Therapy      Return for Recheck and call after treatment or consultation.

## 2022-05-31 ENCOUNTER — OFFICE VISIT (OUTPATIENT)
Dept: NEUROSURGERY | Facility: CLINIC | Age: 87
End: 2022-05-31

## 2022-05-31 VITALS
TEMPERATURE: 98.1 F | HEIGHT: 68 IN | HEART RATE: 63 BPM | SYSTOLIC BLOOD PRESSURE: 130 MMHG | WEIGHT: 173.28 LBS | OXYGEN SATURATION: 97 % | DIASTOLIC BLOOD PRESSURE: 70 MMHG | BODY MASS INDEX: 26.26 KG/M2

## 2022-05-31 DIAGNOSIS — M54.42 ACUTE LEFT-SIDED LOW BACK PAIN WITH LEFT-SIDED SCIATICA: Primary | ICD-10-CM

## 2022-05-31 PROCEDURE — 99203 OFFICE O/P NEW LOW 30 MIN: CPT | Performed by: NEUROLOGICAL SURGERY

## 2022-05-31 RX ORDER — ACETAMINOPHEN 500 MG
500 TABLET ORAL EVERY 6 HOURS PRN
COMMUNITY
End: 2023-01-05

## 2022-06-15 ENCOUNTER — TELEPHONE (OUTPATIENT)
Dept: FAMILY MEDICINE CLINIC | Facility: CLINIC | Age: 87
End: 2022-06-15

## 2022-06-15 DIAGNOSIS — R79.89 ELEVATED TSH: ICD-10-CM

## 2022-06-15 DIAGNOSIS — I10 ESSENTIAL HYPERTENSION: Primary | ICD-10-CM

## 2022-06-15 DIAGNOSIS — E78.49 OTHER HYPERLIPIDEMIA: ICD-10-CM

## 2022-06-17 LAB
ALBUMIN SERPL-MCNC: 3.9 G/DL (ref 3.6–4.6)
ALBUMIN/GLOB SERPL: 1.5 {RATIO} (ref 1.2–2.2)
ALP SERPL-CCNC: 68 IU/L (ref 44–121)
ALT SERPL-CCNC: 24 IU/L (ref 0–44)
AST SERPL-CCNC: 22 IU/L (ref 0–40)
BASOPHILS # BLD AUTO: 0.2 X10E3/UL (ref 0–0.2)
BASOPHILS NFR BLD AUTO: 2 %
BILIRUB SERPL-MCNC: 0.6 MG/DL (ref 0–1.2)
BUN SERPL-MCNC: 18 MG/DL (ref 8–27)
BUN/CREAT SERPL: 15 (ref 10–24)
CALCIUM SERPL-MCNC: 9.6 MG/DL (ref 8.6–10.2)
CHLORIDE SERPL-SCNC: 104 MMOL/L (ref 96–106)
CHOLEST SERPL-MCNC: 146 MG/DL (ref 100–199)
CK SERPL-CCNC: 58 U/L (ref 30–208)
CO2 SERPL-SCNC: 24 MMOL/L (ref 20–29)
CREAT SERPL-MCNC: 1.18 MG/DL (ref 0.76–1.27)
EGFRCR SERPLBLD CKD-EPI 2021: 60 ML/MIN/1.73
EOSINOPHIL # BLD AUTO: 0.6 X10E3/UL (ref 0–0.4)
EOSINOPHIL NFR BLD AUTO: 8 %
ERYTHROCYTE [DISTWIDTH] IN BLOOD BY AUTOMATED COUNT: 12.6 % (ref 11.6–15.4)
GLOBULIN SER CALC-MCNC: 2.6 G/DL (ref 1.5–4.5)
GLUCOSE SERPL-MCNC: 103 MG/DL (ref 65–99)
HCT VFR BLD AUTO: 42.8 % (ref 37.5–51)
HDLC SERPL-MCNC: 48 MG/DL
HGB BLD-MCNC: 13.8 G/DL (ref 13–17.7)
IMM GRANULOCYTES # BLD AUTO: 0 X10E3/UL (ref 0–0.1)
IMM GRANULOCYTES NFR BLD AUTO: 1 %
LDLC SERPL CALC-MCNC: 74 MG/DL (ref 0–99)
LYMPHOCYTES # BLD AUTO: 1.9 X10E3/UL (ref 0.7–3.1)
LYMPHOCYTES NFR BLD AUTO: 23 %
MCH RBC QN AUTO: 30.7 PG (ref 26.6–33)
MCHC RBC AUTO-ENTMCNC: 32.2 G/DL (ref 31.5–35.7)
MCV RBC AUTO: 95 FL (ref 79–97)
MONOCYTES # BLD AUTO: 0.7 X10E3/UL (ref 0.1–0.9)
MONOCYTES NFR BLD AUTO: 9 %
NEUTROPHILS # BLD AUTO: 4.7 X10E3/UL (ref 1.4–7)
NEUTROPHILS NFR BLD AUTO: 57 %
PLATELET # BLD AUTO: 295 X10E3/UL (ref 150–450)
POTASSIUM SERPL-SCNC: 4.7 MMOL/L (ref 3.5–5.2)
PROT SERPL-MCNC: 6.5 G/DL (ref 6–8.5)
RBC # BLD AUTO: 4.5 X10E6/UL (ref 4.14–5.8)
SODIUM SERPL-SCNC: 142 MMOL/L (ref 134–144)
T4 FREE SERPL-MCNC: 0.98 NG/DL (ref 0.82–1.77)
TRIGL SERPL-MCNC: 135 MG/DL (ref 0–149)
TSH SERPL DL<=0.005 MIU/L-ACNC: 6.68 UIU/ML (ref 0.45–4.5)
VLDLC SERPL CALC-MCNC: 24 MG/DL (ref 5–40)
WBC # BLD AUTO: 8.1 X10E3/UL (ref 3.4–10.8)

## 2022-06-20 ENCOUNTER — HOSPITAL ENCOUNTER (OUTPATIENT)
Dept: PHYSICAL THERAPY | Facility: HOSPITAL | Age: 87
Setting detail: THERAPIES SERIES
Discharge: HOME | End: 2022-06-20

## 2022-06-20 DIAGNOSIS — M48.061 SPINAL STENOSIS OF LUMBAR REGION, UNSPECIFIED WHETHER NEUROGENIC CLAUDICATION PRESENT: ICD-10-CM

## 2022-06-20 DIAGNOSIS — M54.50 CHRONIC BILATERAL LOW BACK PAIN, UNSPECIFIED WHETHER SCIATICA PRESENT: Primary | ICD-10-CM

## 2022-06-20 DIAGNOSIS — G89.29 CHRONIC BILATERAL LOW BACK PAIN, UNSPECIFIED WHETHER SCIATICA PRESENT: Primary | ICD-10-CM

## 2022-06-20 PROCEDURE — 97110 THERAPEUTIC EXERCISES: CPT | Performed by: PHYSICAL THERAPIST

## 2022-06-20 PROCEDURE — 97161 PT EVAL LOW COMPLEX 20 MIN: CPT | Performed by: PHYSICAL THERAPIST

## 2022-06-20 NOTE — THERAPY EVALUATION
Outpatient Physical Therapy Ortho Initial Evaluation  [facility]     Patient Name: Gab Maloney  : 1935  MRN: [id number]  Today's Date: 2022      Visit Date: 2022    Patient Active Problem List   Diagnosis   • Other hyperlipidemia   • Essential hypertension   • Elevated TSH   • Nocturia   • History of colon cancer   • Acute left-sided low back pain with left-sided sciatica   • History of poliomyelitis        Past Medical History:   Diagnosis Date   • Arthritis    • BPH (benign prostatic hyperplasia)    • Cataract     BILATERAL   • Colon polyps    • Dupuytren's contracture    • Elevated TSH    • History of chemotherapy     FOLLOWED BY DR. MURTAZA REDMAN   • History of colon polyps 8/15/2018    Added automatically from request for surgery [id number]   • History of radiation therapy     FOLLOWED BY DR. ROYCE BECKWITH   • Hyperlipidemia    • Hypertension    • Nocturia    • Poliomyelitis 1950   • Rectal cancer (HCC) 2011   • Serum potassium elevated 2016        Past Surgical History:   Procedure Laterality Date   • APPENDECTOMY N/A 1943   • COLON SURGERY N/A 2011    LOW ANTERIOR RESECTION OF THE RECTOSIGMOID WITH TEMPORARY ILEOSTOMY, DR. FLIP EVANGELISTA AT [facility]   • COLON SURGERY N/A 10/11/2011    TAKEDOWN OF ILEOSTOMY, DR. FLIP EVANGELISTA AT [facility]   • COLONOSCOPY N/A 2011    CY TO UPPER SIGMOID WITH BIOPSY AND TATTOOING, DR. FLIP EVANGELISTA AT [facility]   • COLONOSCOPY N/A 10/11/2011    SCOPED PRIOR TO TAKEDOWN OF ILEOSTOMY, ENTIRE COLON WNL, RESCOPE IN 1 YR, DR. FLIP EVANGELISTA AT [facility]   • COLONOSCOPY N/A 2018    5 MM HYPERPLASTIC POLYP IN DESCENDING, RESCOPE IN 5 YRS, DR. SABINA NUGENT AT [facility]   • COLONOSCOPY W/ POLYPECTOMY N/A 2015    5 MM SESSILE HYPERPLASTIC POLYP IN HEPATIC FLEXURE, 5 MM SESSILE HYPERPLASTIC POLYP IN DESCENDING, RESCOPE IN 3 YRS, DR. SABINA NUGENT AT [facility]   • COLONOSCOPY W/ POLYPECTOMY N/A 10/31/2013    2 ADENOMATOUS POLYPS, 3 HYPERPLASTIC  POLYPS, DIVERTICULOSIS, PATENT END TO END ANASTAMOSIS, RESCOPE IN 2 YRS, DR. SAIBNA NUGENT AT [facility]   • COLONOSCOPY W/ POLYPECTOMY N/A 03/22/2011    RECTAL MASS, ADENOMATOUS POLYP, DR. MARYAM YBARRA AT [place]    • COLONOSCOPY W/ POLYPECTOMY N/A 10/24/2012    1 TUBULAR ADENOMA POLYP, 2 HYPERPLASTIC POLYPS, PATENT ANASTAMOSIS, RESCOPE IN 1 YR, DR. SABINA NUGENT AT [facility]   • DUPUYTREN CONTRACTURE RELEASE N/A 2005   • EYE SURGERY Bilateral 2006    CATARACT EXTRACTION   • EYE SURGERY  2011    LASER   • EYE SURGERY  2012    LASER   • PORTACATH PLACEMENT Right 04/15/2011    RIGHT JUGULAR, DR. KISHA CONTRERAS AT [facility]   • SIGMOIDOSCOPY N/A 03/31/2011    LESION AT 6 CM ABOVE DENATATE LINE, DR. FLIP EVANGELISTA       Visit Dx:     ICD-10-CM ICD-9-CM   1. Chronic bilateral low back pain, unspecified whether sciatica present  M54.50 724.2    G89.29 338.29   2. Spinal stenosis of lumbar region, unspecified whether neurogenic claudication present  M48.061 724.02          Patient History     Row Name 06/20/22 1000             History    Chief Complaint Pain  -GR      Type of Pain Back pain  -GR      Date Current Problem(s) Began 11/01/21  -GR      Brief Description of Current Complaint Back/L hip pain since November of 2021. States it started as a dull pain and has progressed to more severe. He states his symptoms are now staying about the same.  He has numbnes tingling all the way down to his L foot that is intermittent.  He has had 2 EVIN injections since March at [facility]; they did provide relief to help him walk better; the 1st was better than the 2nd. He no longer c/o pain with coughing/sneezing. His walking is quite limited - about 10 steps. He is not bothered with sitting or sleeping. He takes tylenol for pain control; has not tried ice, heat, massage.  -GR      Patient/Caregiver Goals Relieve pain  -GR      Occupation/sports/leisure activities Retired ; used to enjoy walking with his wife  -GR              Pain     Pain  Location Back;Leg  -GR      Pain at Present 5  -GR      Pain at Best 5  -GR      Pain at Worst 10  -GR      Is your sleep disturbed? No  -GR              Fall Risk Assessment    Any falls in the past year: No  -GR              Services    Do you plan to receive Home Health services in the near future No  -GR              Daily Activities    Primary Language English  -GR      How does patient learn best? Listening;Reading;Demonstration  -GR      Pt Participated in POC and Goals Yes  -GR              Safety    Are you being hurt, hit, or frightened by anyone at home or in your life? No  -GR      Are you being neglected by a caregiver No  -GR      Have you had any of the following issues with N/A  -GR            User Key  (r) = Recorded By, (t) = Taken By, (c) = Cosigned By    Initials Name Provider Type    Eliud Su, PT Physical Therapist                 PT Ortho     Row Name 06/20/22 1000       Quarter Clearing    Quarter Clearing Lower Quarter Clearing  -GR       Myotomal Screen- Lower Quarter Clearing    Hip flexion (L2) Bilateral:;4 (Good)  R causes pain in L Lumbar  -GR    Knee extension (L3) Bilateral:;5 (Normal)  -GR    Ankle DF (L4) Bilateral:;4 (Good)  -GR    Great toe extension (L5) Right:;5 (Normal);Left:;4- (Good -)  -GR    Ankle PF (S1) Bilateral:;4 (Good)  -GR    Knee flexion (S2) Bilateral:;4 (Good)  -GR       Lumbar ROM Screen- Lower Quarter Clearing    Lumbar Flexion Impaired  to distal tibia  -GR    Lumbar Extension Impaired  75% impaired  -GR    Lumbar Lateral Flexion Impaired  50% impaired bilaterally; R causes contralateral pain  -GR    Lumbar Rotation Impaired  75% impaired bilaterally; R causes contralateral pain  -GR       Flexibility    Flexibility Tested? Lower Extremity  -GR       Lower Extremity Flexibility    Hamstrings Bilateral:;Moderately limited  -GR    Hip External Rotators Bilateral:;Moderately limited  -GR    Hip Internal Rotators Bilateral:;Moderately limited  -GR        Balance Skills Training    SLS R intact 5 sec, L intact 2 sec  -GR          User Key  (r) = Recorded By, (t) = Taken By, (c) = Cosigned By    Initials Name Provider Type    Eliud Su, PT Physical Therapist                            Therapy Education  Education Details: at length review of anatomy with spine model, body mechanics basics, spinal stenosis and effects of flexion vs extension, expectations of PT and POC      PT OP Goals     Row Name 06/20/22 1200          PT Short Term Goals    STG Date to Achieve 07/20/22  -GR     STG 1 Patient will be independent with initial HEP.  -GR     STG 1 Progress New  -GR     STG 2 Walking tolerance to 15 minutes or greater to increase household tasks.  -GR     STG 2 Progress New  -     STG 3 25% improvement in lateral and rotation trunk mobility to ease transfers.  -GR     STG 3 Progress New  -GR            Long Term Goals    LTG Date to Achieve 08/19/22  -GR     LTG 1 Patient will be independent with progressive HEP for long term condition management.  -GR     LTG 1 Progress New  -     LTG 2 Patient will resume stair navigation at [facility].  -GR     LTG 2 Progress New  -GR     LTG 3 Patient will ambulate 30 minutes or greater with pain </=3/10 to improve community participation.  -GR     LTG 3 Progress New  -GR     LTG 4 Patient will score </=44% disability on the modified HIEU to indicate improved perceieved ADL performance.  -     LTG 4 Progress New  -GR            Time Calculation    PT Goal Re-Cert Due Date 09/18/22  -           User Key  (r) = Recorded By, (t) = Taken By, (c) = Cosigned By    Initials Name Provider Type    Eliud Su, PT Physical Therapist                 PT Assessment/Plan     Row Name 06/20/22 1200          PT Assessment    Functional Limitations Impaired gait;Limitation in home management;Limitations in community activities;Limitations in functional capacity and performance;Performance in leisure activities;Performance in  self-care ADL  -GR     Impairments Balance;Gait;Pain;Muscle strength;Joint mobility;Poor body mechanics;Posture;Range of motion  -GR     Assessment Comments 85 y/o M referred to outpatient PT for evaluation and treatment of insidious onset LBP with L radiculopathy since Novemeber 2021, stenosis/DDD.  He presents with limited trunk mobility, decreased core strength, impaired gait (endurance and mechanics), inability to climb steps. PMH pertinent for HTN. His condition is stable. Recommend skilled PT to address functional deficits. Thank you for this referral.  -GR     Please refer to paper survey for additional self-reported information Yes  -GR     Rehab Potential Good  -GR     Patient/caregiver participated in establishment of treatment plan and goals Yes  -GR     Patient would benefit from skilled therapy intervention Yes  -GR            PT Plan    PT Frequency 2x/week  -GR     Predicted Duration of Therapy Intervention (PT) 10 visits  -GR     Planned CPT's? PT EVAL LOW COMPLEXITY: 01401;PT RE-EVAL: 52743;PT THER PROC EA 15 MIN: 31475;PT THER ACT EA 15 MIN: 37033;PT MANUAL THERAPY EA 15 MIN: 58417;PT NEUROMUSC RE-EDUCATION EA 15 MIN: 62471;PT SELF CARE/HOME MGMT/TRAIN EA 15: 79885;PT HOT OR COLD PACK TREAT [facility];PT ELECTRICAL STIM UNATTEND: ;PT ELECTRICAL STIM ATTD EA 15 MIN: 74338;PT TRACTION LUMBAR: 53189  -GR     Physical Therapy Interventions (Optional Details) home exercise program;manual therapy techniques;patient/family education;neuromuscular re-education;postural re-education;strengthening;stretching  -GR     PT Plan Comments Begin with nustep and HEP review.  Consider manual to lower lumbar/L hip.  Progress with core training/LE flexibility.  -GR           User Key  (r) = Recorded By, (t) = Taken By, (c) = Cosigned By    Initials Name Provider Type    GR Eliud Vanegas, PT Physical Therapist                   OP Exercises     Row Name 06/20/22 1200             Total Minutes    63510 - PT  Therapeutic Exercise Minutes 12  -GR              Exercise 1    Exercise Name 1 PPT  -GR      Cueing 1 Demo  -GR      Sets 1 1  -GR      Reps 1 10  -GR              Exercise 2    Exercise Name 2 LTR  -GR      Cueing 2 Demo  -GR      Sets 2 1  -GR      Reps 2 5  -GR              Exercise 3    Exercise Name 3 SKTC  -GR      Cueing 3 Demo  -GR      Sets 3 1  -GR      Reps 3 2  -GR      Time 3 10 sec  -GR              Exercise 4    Exercise Name 4 Piriformis stretch -supine cross body  -GR      Cueing 4 Demo  -GR      Sets 4 1  -GR      Reps 4 1  -GR      Time 4 20 seconds  -GR              Exercise 5    Exercise Name 5 Seated HS stretch  -GR      Cueing 5 Demo  -GR      Sets 5 1  -GR      Reps 5 1  -GR      Time 5 20 seconds  -GR            User Key  (r) = Recorded By, (t) = Taken By, (c) = Cosigned By    Initials Name Provider Type    GR Eliud Vanegas, PT Physical Therapist                              Outcome Measure Options: Modified Oswestry  Modified Oswestry  Modified Oswestry Score/Comments: 54% disability      Time Calculation:     Start Time: 1045  Stop Time: 1130  Time Calculation (min): 45 min  Total Timed Code Minutes- PT: 12 minute(s)  Timed Charges  64747 - PT Therapeutic Exercise Minutes: 12  Untimed Charges  PT Eval/Re-eval Minutes: 33  Total Minutes  Timed Charges Total Minutes: 12  Untimed Charges Total Minutes: 33   Total Minutes: 33     Therapy Charges for Today     Code Description Service Date Service Provider Modifiers Qty    05494463650 HC PT THER PROC EA 15 MIN 6/20/2022 Eliud Vanegas, PT GP 1    17018811188 HC PT EVAL LOW COMPLEXITY 2 6/20/2022 Eliud Vanegas, PT GP 1          PT G-Codes  Outcome Measure Options: Modified Oswestry  Modified Oswestry Score/Comments: 54% disability         Eliud Vanegas, PAMELA  6/20/2022

## 2022-06-23 ENCOUNTER — OFFICE VISIT (OUTPATIENT)
Dept: FAMILY MEDICINE CLINIC | Facility: CLINIC | Age: 87
End: 2022-06-23

## 2022-06-23 VITALS
HEART RATE: 65 BPM | HEIGHT: 68 IN | WEIGHT: 170 LBS | SYSTOLIC BLOOD PRESSURE: 112 MMHG | OXYGEN SATURATION: 98 % | TEMPERATURE: 98.7 F | BODY MASS INDEX: 25.76 KG/M2 | RESPIRATION RATE: 18 BRPM | DIASTOLIC BLOOD PRESSURE: 68 MMHG

## 2022-06-23 DIAGNOSIS — I10 ESSENTIAL HYPERTENSION: Primary | ICD-10-CM

## 2022-06-23 DIAGNOSIS — G89.29 CHRONIC BILATERAL LOW BACK PAIN WITH LEFT-SIDED SCIATICA: ICD-10-CM

## 2022-06-23 DIAGNOSIS — M54.42 CHRONIC BILATERAL LOW BACK PAIN WITH LEFT-SIDED SCIATICA: ICD-10-CM

## 2022-06-23 DIAGNOSIS — R79.89 ELEVATED TSH: ICD-10-CM

## 2022-06-23 DIAGNOSIS — E78.49 OTHER HYPERLIPIDEMIA: ICD-10-CM

## 2022-06-23 DIAGNOSIS — M51.36 DDD (DEGENERATIVE DISC DISEASE), LUMBAR: ICD-10-CM

## 2022-06-23 PROCEDURE — 99214 OFFICE O/P EST MOD 30 MIN: CPT | Performed by: FAMILY MEDICINE

## 2022-06-23 RX ORDER — DULOXETIN HYDROCHLORIDE 30 MG/1
30 CAPSULE, DELAYED RELEASE ORAL NIGHTLY
Qty: 90 CAPSULE | Refills: 2 | Status: SHIPPED | OUTPATIENT
Start: 2022-06-23 | End: 2023-01-05

## 2022-06-23 RX ORDER — LISINOPRIL 20 MG/1
20 TABLET ORAL DAILY
Qty: 90 TABLET | Refills: 2 | Status: SHIPPED | OUTPATIENT
Start: 2022-06-23 | End: 2023-01-05 | Stop reason: SDUPTHER

## 2022-06-23 RX ORDER — ATORVASTATIN CALCIUM 10 MG/1
10 TABLET, FILM COATED ORAL NIGHTLY
Qty: 90 TABLET | Refills: 2 | Status: SHIPPED | OUTPATIENT
Start: 2022-06-23 | End: 2023-01-05 | Stop reason: SDUPTHER

## 2022-06-23 NOTE — PROGRESS NOTES
"Chief Complaint  Hypertension (6 month f/u)    Subjective     {Problem List  Visit Diagnosis  Review (Popup)  Encounters  Notes  Medications  Labs  Result Review Imaging  Media :23}     Gab presents to [facility]   to refill medications and review labs.  No medication side effects are reported.     Ongoing back pain. Seeing neurosurgeon.             Objective   Vital Signs:   Vitals:    06/23/22 1112   BP: 112/68   Pulse: 65   Resp: 18   Temp: 98.7 °F (37.1 °C)   SpO2: 98%   Weight: 77.1 kg (170 lb)   Height: 172.7 cm (67.99\")                Physical Exam  Vitals reviewed.   Constitutional:       General: He is not in acute distress.  Eyes:      General: Lids are normal.      Conjunctiva/sclera: Conjunctivae normal.   Neck:      Vascular: No carotid bruit.      Trachea: No tracheal deviation.   Cardiovascular:      Rate and Rhythm: Normal rate and regular rhythm.      Heart sounds: Normal heart sounds. No murmur heard.  Pulmonary:      Effort: Pulmonary effort is normal.      Breath sounds: Normal breath sounds.   Skin:     General: Skin is warm and dry.   Neurological:      Mental Status: He is alert. He is not disoriented.   Psychiatric:         Speech: Speech normal.         Behavior: Behavior normal. Behavior is cooperative.          Result Review :                Assessment and Plan    Diagnoses and all orders for this visit:    1. Essential hypertension (Primary)  Assessment & Plan:  Hypertension is unchanged.  Continue current treatment regimen.  Blood pressure will be reassessed at the next regular appointment.    Orders:  -     lisinopril (PRINIVIL,ZESTRIL) 20 MG tablet; Take 1 tablet by mouth Daily for 270 days.  Dispense: 90 tablet; Refill: 2  -     Comprehensive Metabolic Panel; Future  -     CBC & Differential; Future  -     TSH; Future    2. Other hyperlipidemia  Assessment & Plan:  Lipid abnormalities are unchanged.  Pharmacotherapy as ordered.  Lipids will be " reassessed in 6 months.    Orders:  -     atorvastatin (LIPITOR) 10 MG tablet; Take 1 tablet by mouth Every Night for 270 days.  Dispense: 90 tablet; Refill: 2  -     Lipid Panel With / Chol / HDL Ratio; Future  -     CK; Future    3. Chronic bilateral low back pain with left-sided sciatica  -     DULoxetine (CYMBALTA) 30 MG capsule; Take 1 capsule by mouth Every Night for 270 days.  Dispense: 90 capsule; Refill: 2    4. DDD (degenerative disc disease), lumbar  -     DULoxetine (CYMBALTA) 30 MG capsule; Take 1 capsule by mouth Every Night for 270 days.  Dispense: 90 capsule; Refill: 2    5. Elevated TSH  Assessment & Plan:  Continue to monitor. asymptomatic    Orders:  -     TSH; Future      Follow Up   Return in about 7 months (around 1/23/2023) for Medicare Wellness & regular visit, [id number] min.  Patient was given instructions and counseling regarding his condition or for health maintenance advice. Please see specific information pulled into the AVS if appropriate.

## 2022-06-24 ENCOUNTER — HOSPITAL ENCOUNTER (OUTPATIENT)
Dept: PHYSICAL THERAPY | Facility: HOSPITAL | Age: 87
Setting detail: THERAPIES SERIES
Discharge: HOME | End: 2022-06-24

## 2022-06-24 DIAGNOSIS — G89.29 CHRONIC BILATERAL LOW BACK PAIN, UNSPECIFIED WHETHER SCIATICA PRESENT: Primary | ICD-10-CM

## 2022-06-24 DIAGNOSIS — M54.50 CHRONIC BILATERAL LOW BACK PAIN, UNSPECIFIED WHETHER SCIATICA PRESENT: Primary | ICD-10-CM

## 2022-06-24 DIAGNOSIS — M48.061 SPINAL STENOSIS OF LUMBAR REGION, UNSPECIFIED WHETHER NEUROGENIC CLAUDICATION PRESENT: ICD-10-CM

## 2022-06-24 PROCEDURE — 97140 MANUAL THERAPY 1/> REGIONS: CPT | Performed by: PHYSICAL THERAPIST

## 2022-06-24 PROCEDURE — 97110 THERAPEUTIC EXERCISES: CPT | Performed by: PHYSICAL THERAPIST

## 2022-06-24 NOTE — THERAPY TREATMENT NOTE
Outpatient Physical Therapy Ortho Treatment Note  [facility]     Patient Name: Gab Maloney  : 1935  MRN: [id number]  Today's Date: 2022      Visit Date: 2022    Visit Dx:    ICD-10-CM ICD-9-CM   1. Chronic bilateral low back pain, unspecified whether sciatica present  M54.50 724.2    G89.29 338.29   2. Spinal stenosis of lumbar region, unspecified whether neurogenic claudication present  M48.061 724.02       Patient Active Problem List   Diagnosis   • Other hyperlipidemia   • Essential hypertension   • Elevated TSH   • Nocturia   • History of colon cancer   • Acute left-sided low back pain with left-sided sciatica   • History of poliomyelitis        Past Medical History:   Diagnosis Date   • Arthritis    • BPH (benign prostatic hyperplasia)    • Cataract     BILATERAL   • Colon polyps    • Dupuytren's contracture    • Elevated TSH    • History of chemotherapy     FOLLOWED BY DR. MURTAZA REDMAN   • History of colon polyps 8/15/2018    Added automatically from request for surgery [id number]   • History of radiation therapy     FOLLOWED BY DR. ROYCE BECKWITH   • Hyperlipidemia    • Hypertension    • Nocturia    • Poliomyelitis 1950   • Rectal cancer (HCC) 2011   • Serum potassium elevated 2016        Past Surgical History:   Procedure Laterality Date   • APPENDECTOMY N/A 1943   • COLON SURGERY N/A 2011    LOW ANTERIOR RESECTION OF THE RECTOSIGMOID WITH TEMPORARY ILEOSTOMY, DR. FLIP EVANGELISTA AT [facility]   • COLON SURGERY N/A 10/11/2011    TAKEDOWN OF ILEOSTOMY, DR. FLIP EVANGELISTA AT [facility]   • COLONOSCOPY N/A 2011    CY TO UPPER SIGMOID WITH BIOPSY AND TATTOOING, DR. FLIP EVANGELISTA AT [facility]   • COLONOSCOPY N/A 10/11/2011    SCOPED PRIOR TO TAKEDOWN OF ILEOSTOMY, ENTIRE COLON WNL, RESCOPE IN 1 YR, DR. FLIP EVANGELISTA AT [facility]   • COLONOSCOPY N/A 2018    5 MM HYPERPLASTIC POLYP IN DESCENDING, RESCOPE IN 5 YRS, DR. SABINA NUGENT AT [facility]   • COLONOSCOPY W/ POLYPECTOMY  N/A 11/04/2015    5 MM SESSILE HYPERPLASTIC POLYP IN HEPATIC FLEXURE, 5 MM SESSILE HYPERPLASTIC POLYP IN DESCENDING, RESCOPE IN 3 YRS, DR. SABINA NUGENT AT [facility]   • COLONOSCOPY W/ POLYPECTOMY N/A 10/31/2013    2 ADENOMATOUS POLYPS, 3 HYPERPLASTIC POLYPS, DIVERTICULOSIS, PATENT END TO END ANASTAMOSIS, RESCOPE IN 2 YRS, DR. SABINA NUGENT AT [facility]   • COLONOSCOPY W/ POLYPECTOMY N/A 03/22/2011    RECTAL MASS, ADENOMATOUS POLYP, DR. MARYAM YBARRA AT [place]    • COLONOSCOPY W/ POLYPECTOMY N/A 10/24/2012    1 TUBULAR ADENOMA POLYP, 2 HYPERPLASTIC POLYPS, PATENT ANASTAMOSIS, RESCOPE IN 1 YR, DR. SABINA NUGENT AT [facility]   • DUPUYTREN CONTRACTURE RELEASE N/A 2005   • EYE SURGERY Bilateral 2006    CATARACT EXTRACTION   • EYE SURGERY  2011    LASER   • EYE SURGERY  2012    LASER   • PORTACATH PLACEMENT Right 04/15/2011    RIGHT JUGULAR, DR. ELIUD CONTRERAS AT [facility]   • SIGMOIDOSCOPY N/A 03/31/2011    LESION AT 6 CM ABOVE DENATATE LINE, DR. FLIP EVANGELISTA                        PT Assessment/Plan     Row Name 06/24/22 1000          PT Assessment    Assessment Comments Patient returns for 1st follow up visit. Needs moderate cueing to appropriately pace HEP, wife is helpful. Added 2 new exercises and initiated manual therapy. Pain was improved leaving clinic, gait still limited.  -GR            PT Plan    PT Plan Comments Assess carryover from manual and continue with progressive core/lower quarter strengthening and hip flexibility.  -GR           User Key  (r) = Recorded By, (t) = Taken By, (c) = Cosigned By    Initials Name Provider Type    Eliud Su, PT Physical Therapist                   OP Exercises     Row Name 06/24/22 1000 06/24/22 0900          Total Minutes    11380 - PT Therapeutic Exercise Minutes 30  -GR --     41776 - PT Manual Therapy Minutes -- 12  -GR            Exercise 1    Exercise Name 1 PPT  -GR --     Cueing 1 Demo  -GR --     Sets 1 2  -GR --     Reps 1 10  -GR --     Time 1 5 sec  -GR --     Additional  Comments exhale on the way down  -GR --            Exercise 2    Exercise Name 2 LTR  -GR --     Cueing 2 Demo  -GR --     Sets 2 2  -GR --     Reps 2 10  -GR --            Exercise 3    Exercise Name 3 SKTC  -GR --     Cueing 3 Demo  -GR --     Sets 3 1  -GR --     Reps 3 10  -GR --     Time 3 10 sec  -GR --     Additional Comments each side  -GR --            Exercise 4    Exercise Name 4 Piriformis stretch -supine cross body  -GR --     Cueing 4 Demo  -GR --     Sets 4 1  -GR --     Reps 4 3  -GR --     Time 4 20 seconds  -GR --            Exercise 5    Exercise Name 5 Seated HS stretch  -GR --     Cueing 5 Demo  -GR --     Sets 5 1  -GR --     Reps 5 3  -GR --     Time 5 20 seconds  -GR --            Exercise 6    Exercise Name 6 HL hip adduction + TA draw-in  -GR --     Cueing 6 Demo  -GR --     Sets 6 2  -GR --     Reps 6 10  -GR --     Time 6 5 sec  -GR --     Additional Comments small blue ball  -GR --            Exercise 7    Exercise Name 7 HL clam  -GR --     Cueing 7 Demo  -GR --     Sets 7 2  -GR --     Reps 7 10  -GR --     Additional Comments RTB  -GR --           User Key  (r) = Recorded By, (t) = Taken By, (c) = Cosigned By    Initials Name Provider Type    GR Eliud Vanegas, PT Physical Therapist                         Manual Rx (last 36 hours)     Manual Treatments     Row Name 06/24/22 0900             Total Minutes    00667 - PT Manual Therapy Minutes 12  -GR              Manual Rx 1    Manual Rx 1 Location STM with foam roller in R S/L to L ITB, piriformis, lumbar paraspinals  -GR      Manual Rx 1 Type pillow  between  knees  -GR            User Key  (r) = Recorded By, (t) = Taken By, (c) = Cosigned By    Initials Name Provider Type    GR Eliud Vanegas, PT Physical Therapist                 PT OP Goals     Row Name 06/24/22 1000          PT Short Term Goals    STG Date to Achieve 07/20/22  -GR     STG 1 Patient will be independent with initial HEP.  -GR     STG 1 Progress Ongoing  -GR      STG 2 Walking tolerance to 15 minutes or greater to increase household tasks.  -GR     STG 2 Progress Ongoing  -GR     STG 3 25% improvement in lateral and rotation trunk mobility to ease transfers.  -GR     STG 3 Progress Ongoing  -GR            Long Term Goals    LTG Date to Achieve 08/19/22  -GR     LTG 1 Patient will be independent with progressive HEP for long term condition management.  -GR     LTG 1 Progress Ongoing  -GR     LTG 2 Patient will resume stair navigation at [facility].  -GR     LTG 2 Progress Ongoing  -GR     LTG 3 Patient will ambulate 30 minutes or greater with pain </=3/10 to improve community participation.  -GR     LTG 3 Progress Ongoing  -GR     LTG 4 Patient will score </=44% disability on the modified HIEU to indicate improved perceieved ADL performance.  -GR     LTG 4 Progress Ongoing  -GR           User Key  (r) = Recorded By, (t) = Taken By, (c) = Cosigned By    Initials Name Provider Type    Eliud Su, PT Physical Therapist                Therapy Education  Education Details: [id number] UPDATED "[employer] [place]"              Time Calculation:   Start Time: 1000  Stop Time: 1042  Time Calculation (min): 42 min  Total Timed Code Minutes- PT: 42 minute(s)  Timed Charges  40395 - PT Therapeutic Exercise Minutes: 30  71643 - PT Manual Therapy Minutes: 12  Total Minutes  Timed Charges Total Minutes: 30   Total Minutes: 30  Therapy Charges for Today     Code Description Service Date Service Provider Modifiers Qty    20042156230  PT THER PROC EA 15 MIN 6/24/2022 Eliud Vanegas, PT GP 2    37684583027 HC PT MANUAL THERAPY EA 15 MIN 6/24/2022 Eliud Vanegas, PT GP 1                    Eliud Vanegas, PAMELA  6/24/2022

## 2022-06-28 ENCOUNTER — HOSPITAL ENCOUNTER (OUTPATIENT)
Dept: PHYSICAL THERAPY | Facility: HOSPITAL | Age: 87
Setting detail: THERAPIES SERIES
Discharge: HOME | End: 2022-06-28

## 2022-06-28 DIAGNOSIS — M48.061 SPINAL STENOSIS OF LUMBAR REGION, UNSPECIFIED WHETHER NEUROGENIC CLAUDICATION PRESENT: ICD-10-CM

## 2022-06-28 DIAGNOSIS — M54.50 CHRONIC BILATERAL LOW BACK PAIN, UNSPECIFIED WHETHER SCIATICA PRESENT: Primary | ICD-10-CM

## 2022-06-28 DIAGNOSIS — G89.29 CHRONIC BILATERAL LOW BACK PAIN, UNSPECIFIED WHETHER SCIATICA PRESENT: Primary | ICD-10-CM

## 2022-06-28 PROCEDURE — 97140 MANUAL THERAPY 1/> REGIONS: CPT

## 2022-06-28 PROCEDURE — 97110 THERAPEUTIC EXERCISES: CPT

## 2022-06-28 NOTE — THERAPY TREATMENT NOTE
Outpatient Physical Therapy Ortho Treatment Note  [facility]     Patient Name: Gab Maloney  : 1935  MRN: [id number]  Today's Date: 2022      Visit Date: 2022    Visit Dx:    ICD-10-CM ICD-9-CM   1. Chronic bilateral low back pain, unspecified whether sciatica present  M54.50 724.2    G89.29 338.29   2. Spinal stenosis of lumbar region, unspecified whether neurogenic claudication present  M48.061 724.02       Patient Active Problem List   Diagnosis   • Other hyperlipidemia   • Essential hypertension   • Elevated TSH   • Nocturia   • History of colon cancer   • Acute left-sided low back pain with left-sided sciatica   • History of poliomyelitis        Past Medical History:   Diagnosis Date   • Arthritis    • BPH (benign prostatic hyperplasia)    • Cataract     BILATERAL   • Colon polyps    • Dupuytren's contracture    • Elevated TSH    • History of chemotherapy     FOLLOWED BY DR. MURTAZA REDMAN   • History of colon polyps 8/15/2018    Added automatically from request for surgery [id number]   • History of radiation therapy     FOLLOWED BY DR. ROYCE BECKWITH   • Hyperlipidemia    • Hypertension    • Nocturia    • Poliomyelitis 1950   • Rectal cancer (HCC) 2011   • Serum potassium elevated 2016        Past Surgical History:   Procedure Laterality Date   • APPENDECTOMY N/A 1943   • COLON SURGERY N/A 2011    LOW ANTERIOR RESECTION OF THE RECTOSIGMOID WITH TEMPORARY ILEOSTOMY, DR. FLIP EVANGELISTA AT [facility]   • COLON SURGERY N/A 10/11/2011    TAKEDOWN OF ILEOSTOMY, DR. FLIP EVANGELISTA AT [facility]   • COLONOSCOPY N/A 2011    CY TO UPPER SIGMOID WITH BIOPSY AND TATTOOING, DR. FLIP EVANGELISTA AT [facility]   • COLONOSCOPY N/A 10/11/2011    SCOPED PRIOR TO TAKEDOWN OF ILEOSTOMY, ENTIRE COLON WNL, RESCOPE IN 1 YR, DR. FLIP EVANGELISTA AT [facility]   • COLONOSCOPY N/A 2018    5 MM HYPERPLASTIC POLYP IN DESCENDING, RESCOPE IN 5 YRS, DR. SABINA NUGENT AT [facility]   • COLONOSCOPY W/ POLYPECTOMY  N/A 11/04/2015    5 MM SESSILE HYPERPLASTIC POLYP IN HEPATIC FLEXURE, 5 MM SESSILE HYPERPLASTIC POLYP IN DESCENDING, RESCOPE IN 3 YRS, DR. SABINA NUGENT AT [facility]   • COLONOSCOPY W/ POLYPECTOMY N/A 10/31/2013    2 ADENOMATOUS POLYPS, 3 HYPERPLASTIC POLYPS, DIVERTICULOSIS, PATENT END TO END ANASTAMOSIS, RESCOPE IN 2 YRS, DR. SABINA NUGENT AT [facility]   • COLONOSCOPY W/ POLYPECTOMY N/A 03/22/2011    RECTAL MASS, ADENOMATOUS POLYP, DR. MARYAM YBARRA AT [place]    • COLONOSCOPY W/ POLYPECTOMY N/A 10/24/2012    1 TUBULAR ADENOMA POLYP, 2 HYPERPLASTIC POLYPS, PATENT ANASTAMOSIS, RESCOPE IN 1 YR, DR. SABINA NUGENT AT [facility]   • DUPUYTREN CONTRACTURE RELEASE N/A 2005   • EYE SURGERY Bilateral 2006    CATARACT EXTRACTION   • EYE SURGERY  2011    LASER   • EYE SURGERY  2012    LASER   • PORTACATH PLACEMENT Right 04/15/2011    RIGHT JUGULAR, DR. KISHA CONTRERAS AT [facility]   • SIGMOIDOSCOPY N/A 03/31/2011    LESION AT 6 CM ABOVE DENATATE LINE, DR. FLIP EVANGELISTA                        PT Assessment/Plan     Row Name 06/28/22 1300          PT Assessment    Assessment Comments Patient reports pain has increased today, but he has not been doing his home program. Patient educated on need to do it conistently to see improvements in symptoms and how muscle tension and weakness impacts his pain. Patient verbalized understanding. Continued with hip mobility and progressed manual for for improved hip moblity and decreased lumbar strain. Continued gait limitations on leaving clinic.  -LW            PT Plan    PT Plan Comments Progress core and hip stability, continue with hip mobility  -LW           User Key  (r) = Recorded By, (t) = Taken By, (c) = Cosigned By    Initials Name Provider Type    Kisha Vail, PT Physical Therapist                   OP Exercises     Row Name 06/28/22 1300             Subjective Comments    Subjective Comments He is hurting worse today in thte same spot, not sure why. He went to te grocery yesterday. He hasn't gotten  around to doing his exercises yet.  -LW              Total Minutes    87661 - PT Therapeutic Exercise Minutes 25  -LW      27240 - PT Manual Therapy Minutes 15  -LW              Exercise 1    Exercise Name 1 PPT  -LW      Cueing 1 Demo  -LW      Sets 1 2  -LW      Reps 1 10  -LW      Time 1 5 sec  -LW      Additional Comments exhale on the way down  -LW              Exercise 2    Exercise Name 2 LTR  -LW      Cueing 2 Demo  -LW      Sets 2 2  -LW      Reps 2 10  -LW              Exercise 3    Exercise Name 3 SKTC  -LW      Cueing 3 Demo  -LW      Sets 3 1  -LW      Reps 3 10 ea  -LW      Time 3 10 sec  -LW              Exercise 4    Exercise Name 4 Piriformis stretch -supine cross body  -LW      Cueing 4 Demo  -LW      Sets 4 1  -LW      Reps 4 3 cornelio  -LW      Time 4 20 seconds  -LW              Exercise 6    Exercise Name 6 HL hip adduction + TA draw-in  -LW      Cueing 6 Verbal  -LW      Sets 6 1  -LW      Reps 6 10  -LW      Time 6 10 sec  -LW              Exercise 7    Exercise Name 7 --  -LW      Sets 7 --  -LW      Reps 7 --  -LW      Additional Comments --  -LW            User Key  (r) = Recorded By, (t) = Taken By, (c) = Cosigned By    Initials Name Provider Type    LW Kisha Linares, PT Physical Therapist                         Manual Rx (last 36 hours)     Manual Treatments     Row Name 06/28/22 1300             Total Minutes    69563 - PT Manual Therapy Minutes 15  -LW              Manual Rx 1    Manual Rx 1 Location STM/DTM in R s/l to piriformis, QL/paraspinals. Clamshell with piriformis DTM  -LW      Manual Rx 1 Type pillow  between  knees  -LW            User Key  (r) = Recorded By, (t) = Taken By, (c) = Cosigned By    Initials Name Provider Type    Kisha Vail PT Physical Therapist                                   Time Calculation:   Start Time: 1350  Stop Time: 1430  Time Calculation (min): 40 min  Total Timed Code Minutes- PT: 40 minute(s)  Timed Charges  00576 - PT Therapeutic Exercise  Minutes: 25  91285 - PT Manual Therapy Minutes: 15  Total Minutes  Timed Charges Total Minutes: 40   Total Minutes: 40  Therapy Charges for Today     Code Description Service Date Service Provider Modifiers Qty    99645313057  PT THER PROC EA 15 MIN 6/28/2022 Kisha Linares, PT GP 2    [id number]  PT MANUAL THERAPY EA 15 MIN 6/28/2022 Kisha Linares, PT GP 1                    Kisha Linares, PT  6/28/2022

## 2022-07-05 ENCOUNTER — HOSPITAL ENCOUNTER (OUTPATIENT)
Dept: PHYSICAL THERAPY | Facility: HOSPITAL | Age: 87
Setting detail: THERAPIES SERIES
Discharge: HOME | End: 2022-07-05

## 2022-07-05 DIAGNOSIS — M48.061 SPINAL STENOSIS OF LUMBAR REGION, UNSPECIFIED WHETHER NEUROGENIC CLAUDICATION PRESENT: ICD-10-CM

## 2022-07-05 DIAGNOSIS — G89.29 CHRONIC BILATERAL LOW BACK PAIN, UNSPECIFIED WHETHER SCIATICA PRESENT: Primary | ICD-10-CM

## 2022-07-05 DIAGNOSIS — M54.50 CHRONIC BILATERAL LOW BACK PAIN, UNSPECIFIED WHETHER SCIATICA PRESENT: Primary | ICD-10-CM

## 2022-07-05 PROCEDURE — 97110 THERAPEUTIC EXERCISES: CPT

## 2022-07-05 PROCEDURE — 97140 MANUAL THERAPY 1/> REGIONS: CPT

## 2022-07-05 NOTE — THERAPY TREATMENT NOTE
Outpatient Physical Therapy Ortho Treatment Note  [facility]     Patient Name: Gab Maloney  : 1935  MRN: [id number]  Today's Date: 2022      Visit Date: 2022    Visit Dx:    ICD-10-CM ICD-9-CM   1. Chronic bilateral low back pain, unspecified whether sciatica present  M54.50 724.2    G89.29 338.29   2. Spinal stenosis of lumbar region, unspecified whether neurogenic claudication present  M48.061 724.02       Patient Active Problem List   Diagnosis   • Other hyperlipidemia   • Essential hypertension   • Elevated TSH   • Nocturia   • History of colon cancer   • Acute left-sided low back pain with left-sided sciatica   • History of poliomyelitis        Past Medical History:   Diagnosis Date   • Arthritis    • BPH (benign prostatic hyperplasia)    • Cataract     BILATERAL   • Colon polyps    • Dupuytren's contracture    • Elevated TSH    • History of chemotherapy     FOLLOWED BY DR. MURTAZA REDMAN   • History of colon polyps 8/15/2018    Added automatically from request for surgery [id number]   • History of radiation therapy     FOLLOWED BY DR. ROYCE BECKWITH   • Hyperlipidemia    • Hypertension    • Nocturia    • Poliomyelitis 1950   • Rectal cancer (HCC) 2011   • Serum potassium elevated 2016        Past Surgical History:   Procedure Laterality Date   • APPENDECTOMY N/A 1943   • COLON SURGERY N/A 2011    LOW ANTERIOR RESECTION OF THE RECTOSIGMOID WITH TEMPORARY ILEOSTOMY, DR. FLIP EVANGELISTA AT [facility]   • COLON SURGERY N/A 10/11/2011    TAKEDOWN OF ILEOSTOMY, DR. FLIP EVANGELISTA AT [facility]   • COLONOSCOPY N/A 2011    CY TO UPPER SIGMOID WITH BIOPSY AND TATTOOING, DR. FLIP EVANGELISTA AT [facility]   • COLONOSCOPY N/A 10/11/2011    SCOPED PRIOR TO TAKEDOWN OF ILEOSTOMY, ENTIRE COLON WNL, RESCOPE IN 1 YR, DR. FLIP EVANGELISTA AT [facility]   • COLONOSCOPY N/A 2018    5 MM HYPERPLASTIC POLYP IN DESCENDING, RESCOPE IN 5 YRS, DR. SABINA NUGENT AT [facility]   • COLONOSCOPY W/ POLYPECTOMY  N/A 11/04/2015    5 MM SESSILE HYPERPLASTIC POLYP IN HEPATIC FLEXURE, 5 MM SESSILE HYPERPLASTIC POLYP IN DESCENDING, RESCOPE IN 3 YRS, DR. SABINA NUGENT AT [facility]   • COLONOSCOPY W/ POLYPECTOMY N/A 10/31/2013    2 ADENOMATOUS POLYPS, 3 HYPERPLASTIC POLYPS, DIVERTICULOSIS, PATENT END TO END ANASTAMOSIS, RESCOPE IN 2 YRS, DR. SABINA NUGENT AT [facility]   • COLONOSCOPY W/ POLYPECTOMY N/A 03/22/2011    RECTAL MASS, ADENOMATOUS POLYP, DR. MARYAM YBARRA AT [place]    • COLONOSCOPY W/ POLYPECTOMY N/A 10/24/2012    1 TUBULAR ADENOMA POLYP, 2 HYPERPLASTIC POLYPS, PATENT ANASTAMOSIS, RESCOPE IN 1 YR, DR. SABINA NUGENT AT [facility]   • DUPUYTREN CONTRACTURE RELEASE N/A 2005   • EYE SURGERY Bilateral 2006    CATARACT EXTRACTION   • EYE SURGERY  2011    LASER   • EYE SURGERY  2012    LASER   • PORTACATH PLACEMENT Right 04/15/2011    RIGHT JUGULAR, DR. KISHA CONTRERAS AT [facility]   • SIGMOIDOSCOPY N/A 03/31/2011    LESION AT 6 CM ABOVE DENATATE LINE, DR. FLIP EVANGELISTA                        PT Assessment/Plan     Row Name 07/05/22 1000          PT Assessment    Assessment Comments Performed hip distraction to assess effect on piriformis/SI pain. Patient reports very short-lived improvements in sympoms after. Progressed hip stability exercises for decreased strain to lumbosacral region. Patient able to perform with fatigue/muscle soreness. Patient reports he can feel it helping, but he doesn't like doing it.  -LW            PT Plan    PT Plan Comments Continue with LE flexibility. Consider progressing core with palof press, iso core press.  -LW           User Key  (r) = Recorded By, (t) = Taken By, (c) = Cosigned By    Initials Name Provider Type    LW Kisha Linares, PT Physical Therapist                   OP Exercises     Row Name 07/05/22 1000 07/05/22 0900          Subjective Comments    Subjective Comments He was hurting after last lucia, but symptoms returned to baseline quickly.  -LW --            Total Minutes    37804 - PT Therapeutic Exercise  Minutes 35  -LW --     89613 - PT Manual Therapy Minutes 8  -LW --  -LW            Exercise 2    Exercise Name 2 LTR  -LW --     Cueing 2 Demo  -LW --     Sets 2 2  -LW --     Reps 2 10  -LW --            Exercise 4    Exercise Name 4 Piriformis stretch -supine cross body  -LW --     Cueing 4 Demo  -LW --     Sets 4 1  -LW --     Reps 4 3 cornelio  -LW --     Time 4 20 seconds  -LW --            Exercise 5    Exercise Name 5 Seated HS stretch  -LW --     Cueing 5 Demo  -LW --     Sets 5 1  -LW --     Reps 5 3 cornelio  -LW --     Time 5 20 seconds  -LW --            Exercise 6    Exercise Name 6 HL hip adduction + TA draw-in  -LW --     Cueing 6 Verbal  -LW --     Sets 6 2  -LW --     Reps 6 10  -LW --     Time 6 10 sec  -LW --            Exercise 7    Exercise Name 7 HL clam one leg at a time  -LW --     Cueing 7 Verbal;Tactile  -LW --     Sets 7 2  -LW --     Reps 7 10  -LW --     Additional Comments RTB  -LW --            Exercise 8    Exercise Name 8 Bridge (minimal clearance  -LW --     Cueing 8 Verbal;Tactile  -LW --     Sets 8 2  -LW --     Reps 8 10  -LW --            Exercise 9    Exercise Name 9 supine march with core engagement  -LW --     Cueing 9 Verbal;Tactile  -LW --     Sets 9 2  -LW --     Reps 9 10  -LW --           User Key  (r) = Recorded By, (t) = Taken By, (c) = Cosigned By    Initials Name Provider Type    Kisha Vail PT Physical Therapist                         Manual Rx (last 36 hours)     Manual Treatments     Row Name 07/05/22 1000 07/05/22 0900          Total Minutes    27836 - PT Manual Therapy Minutes 8  -LW --  -LW            Manual Rx 1    Manual Rx 1 Location Lateral distraction L hip with belt  short-lived improvements in symptoms  -LW --  -LW           User Key  (r) = Recorded By, (t) = Taken By, (c) = Cosigned By    Initials Name Provider Type    Kisha Vail, PAMELA Physical Therapist                                   Time Calculation:   Start Time: 1035  Stop Time: 1117  Time  Calculation (min): 42 min  Timed Charges  88109 - PT Therapeutic Exercise Minutes: 35  56466 - PT Manual Therapy Minutes: 8  Total Minutes  Timed Charges Total Minutes: 43   Total Minutes: 43  Therapy Charges for Today     Code Description Service Date Service Provider Modifiers Qty    31016505147 HC PT THER PROC EA 15 MIN 7/5/2022 Kisha Linares, PT GP 2    [id number]  PT MANUAL THERAPY EA 15 MIN 7/5/2022 Kisha Linares, PT GP 1                    Kisha Linares, PT  7/5/2022

## 2022-07-07 ENCOUNTER — HOSPITAL ENCOUNTER (OUTPATIENT)
Dept: PHYSICAL THERAPY | Facility: HOSPITAL | Age: 87
Setting detail: THERAPIES SERIES
Discharge: HOME | End: 2022-07-07

## 2022-07-07 DIAGNOSIS — G89.29 CHRONIC BILATERAL LOW BACK PAIN, UNSPECIFIED WHETHER SCIATICA PRESENT: Primary | ICD-10-CM

## 2022-07-07 DIAGNOSIS — M54.50 CHRONIC BILATERAL LOW BACK PAIN, UNSPECIFIED WHETHER SCIATICA PRESENT: Primary | ICD-10-CM

## 2022-07-07 DIAGNOSIS — M48.061 SPINAL STENOSIS OF LUMBAR REGION, UNSPECIFIED WHETHER NEUROGENIC CLAUDICATION PRESENT: ICD-10-CM

## 2022-07-07 PROCEDURE — 97110 THERAPEUTIC EXERCISES: CPT

## 2022-07-07 PROCEDURE — 97140 MANUAL THERAPY 1/> REGIONS: CPT

## 2022-07-07 NOTE — THERAPY TREATMENT NOTE
Outpatient Physical Therapy Ortho Treatment Note  [facility]     Patient Name: Gab Maloney  : 1935  MRN: [id number]  Today's Date: 2022      Visit Date: 2022    Visit Dx:    ICD-10-CM ICD-9-CM   1. Chronic bilateral low back pain, unspecified whether sciatica present  M54.50 724.2    G89.29 338.29   2. Spinal stenosis of lumbar region, unspecified whether neurogenic claudication present  M48.061 724.02       Patient Active Problem List   Diagnosis   • Other hyperlipidemia   • Essential hypertension   • Elevated TSH   • Nocturia   • History of colon cancer   • Acute left-sided low back pain with left-sided sciatica   • History of poliomyelitis        Past Medical History:   Diagnosis Date   • Arthritis    • BPH (benign prostatic hyperplasia)    • Cataract     BILATERAL   • Colon polyps    • Dupuytren's contracture    • Elevated TSH    • History of chemotherapy     FOLLOWED BY DR. MURTAZA REDMAN   • History of colon polyps 8/15/2018    Added automatically from request for surgery [id number]   • History of radiation therapy     FOLLOWED BY DR. ROYCE BECKWITH   • Hyperlipidemia    • Hypertension    • Nocturia    • Poliomyelitis 1950   • Rectal cancer (HCC) 2011   • Serum potassium elevated 2016        Past Surgical History:   Procedure Laterality Date   • APPENDECTOMY N/A 1943   • COLON SURGERY N/A 2011    LOW ANTERIOR RESECTION OF THE RECTOSIGMOID WITH TEMPORARY ILEOSTOMY, DR. FLIP EVANGELISTA AT [facility]   • COLON SURGERY N/A 10/11/2011    TAKEDOWN OF ILEOSTOMY, DR. FLIP EVANGELISTA AT [facility]   • COLONOSCOPY N/A 2011    CY TO UPPER SIGMOID WITH BIOPSY AND TATTOOING, DR. FLIP EVANGELISTA AT [facility]   • COLONOSCOPY N/A 10/11/2011    SCOPED PRIOR TO TAKEDOWN OF ILEOSTOMY, ENTIRE COLON WNL, RESCOPE IN 1 YR, DR. FLIP EVANGELISTA AT [facility]   • COLONOSCOPY N/A 2018    5 MM HYPERPLASTIC POLYP IN DESCENDING, RESCOPE IN 5 YRS, DR. SABINA NUGENT AT [facility]   • COLONOSCOPY W/ POLYPECTOMY  N/A 11/04/2015    5 MM SESSILE HYPERPLASTIC POLYP IN HEPATIC FLEXURE, 5 MM SESSILE HYPERPLASTIC POLYP IN DESCENDING, RESCOPE IN 3 YRS, DR. SABINA NUGENT AT [facility]   • COLONOSCOPY W/ POLYPECTOMY N/A 10/31/2013    2 ADENOMATOUS POLYPS, 3 HYPERPLASTIC POLYPS, DIVERTICULOSIS, PATENT END TO END ANASTAMOSIS, RESCOPE IN 2 YRS, DR. SABINA NUGENT AT [facility]   • COLONOSCOPY W/ POLYPECTOMY N/A 03/22/2011    RECTAL MASS, ADENOMATOUS POLYP, DR. MARYAM YBARRA AT [place]    • COLONOSCOPY W/ POLYPECTOMY N/A 10/24/2012    1 TUBULAR ADENOMA POLYP, 2 HYPERPLASTIC POLYPS, PATENT ANASTAMOSIS, RESCOPE IN 1 YR, DR. SABINA NUGENT AT [facility]   • DUPUYTREN CONTRACTURE RELEASE N/A 2005   • EYE SURGERY Bilateral 2006    CATARACT EXTRACTION   • EYE SURGERY  2011    LASER   • EYE SURGERY  2012    LASER   • PORTACATH PLACEMENT Right 04/15/2011    RIGHT JUGULAR, DR. KISHA CONTRERAS AT [facility]   • SIGMOIDOSCOPY N/A 03/31/2011    LESION AT 6 CM ABOVE DENATATE LINE, DR. FLIP EVANGELISTA                        PT Assessment/Plan     Row Name 07/07/22 0900          PT Assessment    Assessment Comments Due to reports of increased pain, returned to manual and mobility exercises this sesion to improve spinal mobility and muscle tone. Focused on hip and spinal rotation control and mobility due location of tendernes.  -LW            PT Plan    PT Plan Comments Iso core press, palof press  -LW           User Key  (r) = Recorded By, (t) = Taken By, (c) = Cosigned By    Initials Name Provider Type    Kisha Vail, PT Physical Therapist                   OP Exercises     Row Name 07/07/22 0900             Subjective Comments    Subjective Comments Patient reports he is feeling worse and he doesn't think PT is helping. He has been doing his exercises at home but has trouble getting up off the floor after.  -LW              Total Minutes    54282 - PT Therapeutic Exercise Minutes 27  -LW      63844 - PT Manual Therapy Minutes 15  -LW              Exercise 2    Exercise Name 2  LTR  -LW      Cueing 2 Verbal  -LW      Reps 2 15  -LW              Exercise 7    Exercise Name 7 clamshell  -LW      Cueing 7 Verbal;Tactile  -LW      Sets 7 2  -LW      Reps 7 10  -LW              Exercise 10    Exercise Name 10 Nustep  -LW      Cueing 10 Verbal  -LW      Time 10 5 min  -LW              Exercise 11    Exercise Name 11 Sidelying thoracic rotation  -LW      Cueing 11 Verbal;Tactile  -LW      Sets 11 1 each side  -LW      Reps 11 10  -LW      Time 11 5 sec  -LW            User Key  (r) = Recorded By, (t) = Taken By, (c) = Cosigned By    Initials Name Provider Type    Kisha Vail, PT Physical Therapist                         Manual Rx (last 36 hours)     Manual Treatments     Row Name 07/07/22 0900             Total Minutes    82419 - PT Manual Therapy Minutes 15  -LW              Manual Rx 1    Manual Rx 1 Location STM/DTM in R s/l to piriformis, QL/paraspinals.  -LW      Manual Rx 1 Type pillow  between  knees  -LW            User Key  (r) = Recorded By, (t) = Taken By, (c) = Cosigned By    Initials Name Provider Type    Kisha Vail, PT Physical Therapist                                   Time Calculation:   Start Time: 0948  Stop Time: 1030  Time Calculation (min): 42 min  Total Timed Code Minutes- PT: 42 minute(s)  Timed Charges  31920 - PT Therapeutic Exercise Minutes: 27  53109 - PT Manual Therapy Minutes: 15  Total Minutes  Timed Charges Total Minutes: 42   Total Minutes: 42  Therapy Charges for Today     Code Description Service Date Service Provider Modifiers Qty    37213702502 HC PT THER PROC EA 15 MIN 7/7/2022 Kisha Linares, PT GP 2    31273708125 HC PT MANUAL THERAPY EA 15 MIN 7/7/2022 Kisha Linares, PT GP 1                    Kisha Linares PT  7/7/2022

## 2022-07-12 ENCOUNTER — HOSPITAL ENCOUNTER (OUTPATIENT)
Dept: PHYSICAL THERAPY | Facility: HOSPITAL | Age: 87
Setting detail: THERAPIES SERIES
Discharge: HOME | End: 2022-07-12

## 2022-07-12 DIAGNOSIS — M54.50 CHRONIC BILATERAL LOW BACK PAIN, UNSPECIFIED WHETHER SCIATICA PRESENT: Primary | ICD-10-CM

## 2022-07-12 DIAGNOSIS — G89.29 CHRONIC BILATERAL LOW BACK PAIN, UNSPECIFIED WHETHER SCIATICA PRESENT: Primary | ICD-10-CM

## 2022-07-12 DIAGNOSIS — M48.061 SPINAL STENOSIS OF LUMBAR REGION, UNSPECIFIED WHETHER NEUROGENIC CLAUDICATION PRESENT: ICD-10-CM

## 2022-07-12 PROCEDURE — 97110 THERAPEUTIC EXERCISES: CPT

## 2022-07-12 NOTE — THERAPY TREATMENT NOTE
Outpatient Physical Therapy Ortho Treatment Note  [facility]     Patient Name: Gab Maloney  : 1935  MRN: [id number]  Today's Date: 2022      Visit Date: 2022    Visit Dx:    ICD-10-CM ICD-9-CM   1. Chronic bilateral low back pain, unspecified whether sciatica present  M54.50 724.2    G89.29 338.29   2. Spinal stenosis of lumbar region, unspecified whether neurogenic claudication present  M48.061 724.02       Patient Active Problem List   Diagnosis   • Other hyperlipidemia   • Essential hypertension   • Elevated TSH   • Nocturia   • History of colon cancer   • Acute left-sided low back pain with left-sided sciatica   • History of poliomyelitis        Past Medical History:   Diagnosis Date   • Arthritis    • BPH (benign prostatic hyperplasia)    • Cataract     BILATERAL   • Colon polyps    • Dupuytren's contracture    • Elevated TSH    • History of chemotherapy     FOLLOWED BY DR. MURTAZA REDMAN   • History of colon polyps 8/15/2018    Added automatically from request for surgery [id number]   • History of radiation therapy     FOLLOWED BY DR. ROYCE BECKWITH   • Hyperlipidemia    • Hypertension    • Nocturia    • Poliomyelitis 1950   • Rectal cancer (HCC) 2011   • Serum potassium elevated 2016        Past Surgical History:   Procedure Laterality Date   • APPENDECTOMY N/A 1943   • COLON SURGERY N/A 2011    LOW ANTERIOR RESECTION OF THE RECTOSIGMOID WITH TEMPORARY ILEOSTOMY, DR. FLIP EVANGELISTA AT [facility]   • COLON SURGERY N/A 10/11/2011    TAKEDOWN OF ILEOSTOMY, DR. FLIP EVANGELISTA AT [facility]   • COLONOSCOPY N/A 2011    CY TO UPPER SIGMOID WITH BIOPSY AND TATTOOING, DR. FLIP EVANGELISTA AT [facility]   • COLONOSCOPY N/A 10/11/2011    SCOPED PRIOR TO TAKEDOWN OF ILEOSTOMY, ENTIRE COLON WNL, RESCOPE IN 1 YR, DR. FLIP EVANGELISTA AT [facility]   • COLONOSCOPY N/A 2018    5 MM HYPERPLASTIC POLYP IN DESCENDING, RESCOPE IN 5 YRS, DR. SABINA NUGENT AT [facility]   • COLONOSCOPY W/ POLYPECTOMY  N/A 11/04/2015    5 MM SESSILE HYPERPLASTIC POLYP IN HEPATIC FLEXURE, 5 MM SESSILE HYPERPLASTIC POLYP IN DESCENDING, RESCOPE IN 3 YRS, DR. SABINA NUGENT AT [facility]   • COLONOSCOPY W/ POLYPECTOMY N/A 10/31/2013    2 ADENOMATOUS POLYPS, 3 HYPERPLASTIC POLYPS, DIVERTICULOSIS, PATENT END TO END ANASTAMOSIS, RESCOPE IN 2 YRS, DR. SABINA NUGENT AT [facility]   • COLONOSCOPY W/ POLYPECTOMY N/A 03/22/2011    RECTAL MASS, ADENOMATOUS POLYP, DR. MARYAM YBARRA AT [place]    • COLONOSCOPY W/ POLYPECTOMY N/A 10/24/2012    1 TUBULAR ADENOMA POLYP, 2 HYPERPLASTIC POLYPS, PATENT ANASTAMOSIS, RESCOPE IN 1 YR, DR. SABINA NUGENT AT [facility]   • DUPUYTREN CONTRACTURE RELEASE N/A 2005   • EYE SURGERY Bilateral 2006    CATARACT EXTRACTION   • EYE SURGERY  2011    LASER   • EYE SURGERY  2012    LASER   • PORTACATH PLACEMENT Right 04/15/2011    RIGHT JUGULAR, DR. KISHA CONTRERAS AT [facility]   • SIGMOIDOSCOPY N/A 03/31/2011    LESION AT 6 CM ABOVE DENATATE LINE, DR. FLIP EVANGELISTA                        PT Assessment/Plan     Row Name 07/12/22 1400          PT Assessment    Assessment Comments Progressed hip mobility and core strength. Patient able to perform with minimal complaints. Progressed core strength with seated exercises and flexion stretch.  -LW            PT Plan    PT Plan Comments Progress reistance of seated strength: palof press, B ER, trunk stability with seated marches.  -LW           User Key  (r) = Recorded By, (t) = Taken By, (c) = Cosigned By    Initials Name Provider Type    LW Kisha Linares, PT Physical Therapist                   OP Exercises     Row Name 07/12/22 1400             Subjective Comments    Subjective Comments Patient reports pain is worse since Thursday. He still has pain with walking or doing his exercises.  -LW              Subjective Pain    Able to rate subjective pain? yes  -LW      Pre-Treatment Pain Level 8  -LW              Total Minutes    87789 - PT Therapeutic Exercise Minutes 40  -LW              Exercise 2     Exercise Name 2 LTR  -LW      Cueing 2 Verbal  -LW      Reps 2 15  -LW              Exercise 3    Exercise Name 3 SKTC  -LW      Cueing 3 Verbal;Tactile  -LW      Sets 3 2 each side  -LW      Reps 3 30 sec  -LW              Exercise 4    Exercise Name 4 Seated piriormis stretch  -LW      Cueing 4 Demo;Verbal  -LW      Sets 4 1  -LW      Reps 4 3  -LW      Time 4 30 sec  -LW              Exercise 5    Exercise Name 5 Seated HS stretch  -LW      Cueing 5 Demo  -LW      Sets 5 1  -LW      Reps 5 3 cornelio  -LW      Time 5 20 seconds  -LW              Exercise 9    Exercise Name 9 HL march with core engagement  -LW      Cueing 9 Verbal;Tactile  -LW      Sets 9 2  -LW      Reps 9 10  -LW              Exercise 10    Exercise Name 10 Nustep  -LW      Cueing 10 Verbal  -LW      Time 10 5 min  -LW              Exercise 11    Exercise Name 11 Seated Palof press, cornelio ER  -LW      Cueing 11 Verbal;Demo  -LW      Sets 11 2  -LW      Reps 11 15 ea  -LW      Additional Comments GTB  -LW              Exercise 12    Exercise Name 12 Chair sit-ups  -LW      Cueing 12 Verbal;Demo  -LW      Reps 12 1  -LW      Time 12 15  -LW              Exercise 13    Exercise Name 13 Seated trunk flexion: lumbar stretch  -LW      Cueing 13 Verbal;Demo  -LW      Sets 13 1  -LW      Reps 13 10  -LW      Time 13 5  -LW            User Key  (r) = Recorded By, (t) = Taken By, (c) = Cosigned By    Initials Name Provider Type    LW Kisha Linares, PT Physical Therapist                                                Time Calculation:   Start Time: 1403  Stop Time: 1443  Time Calculation (min): 40 min  Total Timed Code Minutes- PT: 40 minute(s)  Timed Charges  00314 - PT Therapeutic Exercise Minutes: 40  Total Minutes  Timed Charges Total Minutes: 40   Total Minutes: 40  Therapy Charges for Today     Code Description Service Date Service Provider Modifiers Qty    24013356509 HC PT THER PROC EA 15 MIN 7/12/2022 Kisha Linares, PT GP 3                    Kisha  Vijay, PT  7/12/2022

## 2022-07-14 ENCOUNTER — TELEPHONE (OUTPATIENT)
Dept: NEUROSURGERY | Facility: CLINIC | Age: 87
End: 2022-07-14

## 2022-07-14 ENCOUNTER — HOSPITAL ENCOUNTER (OUTPATIENT)
Dept: PHYSICAL THERAPY | Facility: HOSPITAL | Age: 87
Setting detail: THERAPIES SERIES
Discharge: HOME | End: 2022-07-14

## 2022-07-14 DIAGNOSIS — M48.061 SPINAL STENOSIS OF LUMBAR REGION, UNSPECIFIED WHETHER NEUROGENIC CLAUDICATION PRESENT: ICD-10-CM

## 2022-07-14 DIAGNOSIS — G89.29 CHRONIC BILATERAL LOW BACK PAIN, UNSPECIFIED WHETHER SCIATICA PRESENT: Primary | ICD-10-CM

## 2022-07-14 DIAGNOSIS — M54.50 CHRONIC BILATERAL LOW BACK PAIN, UNSPECIFIED WHETHER SCIATICA PRESENT: Primary | ICD-10-CM

## 2022-07-14 PROCEDURE — 97110 THERAPEUTIC EXERCISES: CPT | Performed by: PHYSICAL THERAPIST

## 2022-07-14 NOTE — THERAPY TREATMENT NOTE
Outpatient Physical Therapy Ortho Treatment Note  [facility]     Patient Name: Gab Maloney  : 1935  MRN: [id number]  Today's Date: 2022      Visit Date: 2022    Visit Dx:    ICD-10-CM ICD-9-CM   1. Chronic bilateral low back pain, unspecified whether sciatica present  M54.50 724.2    G89.29 338.29   2. Spinal stenosis of lumbar region, unspecified whether neurogenic claudication present  M48.061 724.02       Patient Active Problem List   Diagnosis   • Other hyperlipidemia   • Essential hypertension   • Elevated TSH   • Nocturia   • History of colon cancer   • Acute left-sided low back pain with left-sided sciatica   • History of poliomyelitis        Past Medical History:   Diagnosis Date   • Arthritis    • BPH (benign prostatic hyperplasia)    • Cataract     BILATERAL   • Colon polyps    • Dupuytren's contracture    • Elevated TSH    • History of chemotherapy     FOLLOWED BY DR. MURTAZA REDMAN   • History of colon polyps 8/15/2018    Added automatically from request for surgery [id number]   • History of radiation therapy     FOLLOWED BY DR. ROYCE BECKWITH   • Hyperlipidemia    • Hypertension    • Nocturia    • Poliomyelitis 1950   • Rectal cancer (HCC) 2011   • Serum potassium elevated 2016        Past Surgical History:   Procedure Laterality Date   • APPENDECTOMY N/A 1943   • COLON SURGERY N/A 2011    LOW ANTERIOR RESECTION OF THE RECTOSIGMOID WITH TEMPORARY ILEOSTOMY, DR. FLIP EVANGELISTA AT [facility]   • COLON SURGERY N/A 10/11/2011    TAKEDOWN OF ILEOSTOMY, DR. FLIP EVANGELISTA AT [facility]   • COLONOSCOPY N/A 2011    CY TO UPPER SIGMOID WITH BIOPSY AND TATTOOING, DR. FLIP EVANGELISTA AT [facility]   • COLONOSCOPY N/A 10/11/2011    SCOPED PRIOR TO TAKEDOWN OF ILEOSTOMY, ENTIRE COLON WNL, RESCOPE IN 1 YR, DR. FLIP EVANGELISTA AT [facility]   • COLONOSCOPY N/A 2018    5 MM HYPERPLASTIC POLYP IN DESCENDING, RESCOPE IN 5 YRS, DR. SABINA NUGENT AT [facility]   • COLONOSCOPY W/ POLYPECTOMY  N/A 11/04/2015    5 MM SESSILE HYPERPLASTIC POLYP IN HEPATIC FLEXURE, 5 MM SESSILE HYPERPLASTIC POLYP IN DESCENDING, RESCOPE IN 3 YRS, DR. SABINA NUGENT AT [facility]   • COLONOSCOPY W/ POLYPECTOMY N/A 10/31/2013    2 ADENOMATOUS POLYPS, 3 HYPERPLASTIC POLYPS, DIVERTICULOSIS, PATENT END TO END ANASTAMOSIS, RESCOPE IN 2 YRS, DR. SABINA NUGENT AT [facility]   • COLONOSCOPY W/ POLYPECTOMY N/A 03/22/2011    RECTAL MASS, ADENOMATOUS POLYP, DR. MARYAM YBARRA AT [place]    • COLONOSCOPY W/ POLYPECTOMY N/A 10/24/2012    1 TUBULAR ADENOMA POLYP, 2 HYPERPLASTIC POLYPS, PATENT ANASTAMOSIS, RESCOPE IN 1 YR, DR. SABINA NUGENT AT [facility]   • DUPUYTREN CONTRACTURE RELEASE N/A 2005   • EYE SURGERY Bilateral 2006    CATARACT EXTRACTION   • EYE SURGERY  2011    LASER   • EYE SURGERY  2012    LASER   • PORTACATH PLACEMENT Right 04/15/2011    RIGHT JUGULAR, DR. KISHA CONTRERAS AT [facility]   • SIGMOIDOSCOPY N/A 03/31/2011    LESION AT 6 CM ABOVE DENATATE LINE, DR. FLIP EVANGELISTA        PT Ortho     Row Name 07/14/22 1045       Subjective Pain    Able to rate subjective pain? yes  -JS    Pre-Treatment Pain Level 9  -JS    Post-Treatment Pain Level 8  -JS          User Key  (r) = Recorded By, (t) = Taken By, (c) = Cosigned By    Initials Name Provider Type    Ankita Ospina, PT Physical Therapist                             PT Assessment/Plan     Row Name 07/14/22 1045          PT Assessment    Assessment Comments Pt presents with continued high subjective pain levels. Symptoms worsen with standing & walking. Able to perform current exercises with proper understanding, noting slight  improvement at the end of the treatment session.  Reviewed HEP with proper understanding.  Limited progress toward goals.  -JS            PT Plan    PT Plan Comments Pt plans to attend x 1 further visit, then plan d/c.  -JS           User Key  (r) = Recorded By, (t) = Taken By, (c) = Cosigned By    Initials Name Provider Type    Ankita Ospina, PT Physical Therapist                    "OP Exercises     Row Name 07/14/22 1045             Subjective Comments    Subjective Comments Back pain is \"terrible\". Increased pain walking into clinic from parking lot today. Notes using grocery cart does relieve some pressure walking, but \"I don't want a walker\".  -JS              Subjective Pain    Able to rate subjective pain? yes  -JS      Pre-Treatment Pain Level 9  -JS      Post-Treatment Pain Level 8  -JS              Total Minutes    13161 - PT Therapeutic Exercise Minutes 40  -JS              Exercise 2    Exercise Name 2 LTR  -JS      Cueing 2 Verbal  -JS      Reps 2 15  -JS              Exercise 3    Exercise Name 3 SKTC  -JS      Cueing 3 Verbal;Tactile  -JS      Sets 3 2 each side  -JS      Reps 3 30 sec  -JS              Exercise 4    Exercise Name 4 Seated piriormis stretch  -JS      Cueing 4 Demo;Verbal  -JS      Sets 4 1  -JS      Reps 4 3  -JS      Time 4 30 sec  -JS              Exercise 5    Exercise Name 5 Seated HS stretch  -JS      Cueing 5 Demo  -JS      Sets 5 1  -JS      Reps 5 3 cornelio  -JS      Time 5 20 seconds  -JS              Exercise 6    Exercise Name 6 HL hip adduction + TA draw-in  -JS      Cueing 6 Verbal  -JS      Sets 6 2  -JS      Reps 6 10  -JS      Time 6 10 sec  -JS      Additional Comments small ball  -JS              Exercise 9    Exercise Name 9 HL march with core engagement  -JS      Cueing 9 Verbal;Tactile  -JS      Sets 9 2  -JS      Reps 9 10  -JS              Exercise 10    Exercise Name 10 Nustep  -JS      Cueing 10 Verbal  -JS      Time 10 5 min  -JS              Exercise 13    Exercise Name 13 Seated trunk flexion: lumbar stretch  -JS      Cueing 13 Verbal;Demo  -JS      Sets 13 1  -JS      Reps 13 10  -JS      Time 13 5  -JS              Exercise 14    Exercise Name 14 hooklying hip abd  -JS      Cueing 14 Verbal;Demo  -JS      Reps 14 10  -JS      Time 14 RTB  -JS              Exercise 15    Exercise Name 15 --  -JS      Cueing 15 --  -JS      Reps 15 --  -JS  "     Time 15 --  -            User Key  (r) = Recorded By, (t) = Taken By, (c) = Cosigned By    Initials Name Provider Type    Ankita Ospina PT Physical Therapist                              PT OP Goals     Row Name 07/14/22 1045          PT Short Term Goals    STG Date to Achieve 07/20/22  -JS     STG 1 Patient will be independent with initial HEP.  -JS     STG 1 Progress Met  -JS     STG 2 Walking tolerance to 15 minutes or greater to increase household tasks.  -JS     STG 2 Progress Ongoing  -JS     STG 3 25% improvement in lateral and rotation trunk mobility to ease transfers.  -JS     STG 3 Progress Ongoing  -JS            Long Term Goals    LTG Date to Achieve 08/19/22  -JS     LTG 1 Patient will be independent with progressive HEP for long term condition management.  -JS     LTG 1 Progress Ongoing  -JS     LTG 2 Patient will resume stair navigation at [facility].  -JS     LTG 2 Progress Ongoing  -JS     LTG 3 Patient will ambulate 30 minutes or greater with pain </=3/10 to improve community participation.  -JS     LTG 3 Progress Ongoing  -JS     LTG 4 Patient will score </=44% disability on the modified HIEU to indicate improved perceieved ADL performance.  -     LTG 4 Progress Ongoing  -           User Key  (r) = Recorded By, (t) = Taken By, (c) = Cosigned By    Initials Name Provider Type    Ankita Ospina PT Physical Therapist                Therapy Education  Education Details: Reviewed & updated HEP with written instruction issued via [employer] [id number]  Given: HEP  Program: Reinforced, Progressed  How Provided: Verbal, Demonstration, Written  Provided to: Patient  Level of Understanding: Teach back education performed, Verbalized, Demonstrated              Time Calculation:   Start Time: 1045  Stop Time: 1125  Time Calculation (min): 40 min  Timed Charges  92669 - PT Therapeutic Exercise Minutes: 40  Total Minutes  Timed Charges Total Minutes: 40   Total Minutes: 40  Therapy Charges for Today      Code Description Service Date Service Provider Modifiers Qty    21510027416 HC PT THER PROC EA 15 MIN 7/14/2022 Ankita Avelar, PT GP 3                    Ankita Avelar, PT  7/14/2022

## 2022-07-15 NOTE — TELEPHONE ENCOUNTER
S/w patient and informed that we can get him scheduled with Dr. Roper on Tuesday 07/19/22 at 12:15pm, patient expressed understanding

## 2022-07-18 ENCOUNTER — OFFICE VISIT (OUTPATIENT)
Dept: NEUROSURGERY | Facility: CLINIC | Age: 87
End: 2022-07-18

## 2022-07-18 VITALS
SYSTOLIC BLOOD PRESSURE: 122 MMHG | DIASTOLIC BLOOD PRESSURE: 85 MMHG | WEIGHT: 170 LBS | HEART RATE: 96 BPM | HEIGHT: 68 IN | TEMPERATURE: 98 F | OXYGEN SATURATION: 99 % | BODY MASS INDEX: 25.76 KG/M2

## 2022-07-18 DIAGNOSIS — M54.42 ACUTE LEFT-SIDED LOW BACK PAIN WITH LEFT-SIDED SCIATICA: Primary | ICD-10-CM

## 2022-07-18 PROCEDURE — 99214 OFFICE O/P EST MOD 30 MIN: CPT | Performed by: NEUROLOGICAL SURGERY

## 2022-07-18 RX ORDER — DEXAMETHASONE 4 MG/1
8 TABLET ORAL TAKE AS DIRECTED
Qty: 2 TABLET | Refills: 0 | Status: SHIPPED | OUTPATIENT
Start: 2022-07-18 | End: 2022-07-26 | Stop reason: HOSPADM

## 2022-07-18 NOTE — PROGRESS NOTES
"Subjective   Patient ID: Gab Maloney is a 86 y.o. male is here today for follow-up with low back pain, and leg numbness.    Today patient states that he has low back pain that radiates to the L leg. Patient also reports intermittent N/T in the L leg.     Patient, Provider, and MA are all wearing a mask in our office today.     History of Present Illness     This patient continues with pain in his back with radiation into his left buttock and down his posterior lateral thigh.  The pain is sharp and stabbing and quite severe.  He did physical therapy after he was here at the end of May and it did not help at all in fact he feels that it made him worse.  He has no other particular complaints.    The following portions of the patient's history were reviewed and updated as appropriate: allergies, current medications, past family history, past medical history, past social history, past surgical history and problem list.    Review of Systems   Constitutional: Negative for chills and fever.   HENT: Negative for congestion.    Musculoskeletal: Positive for back pain, gait problem and myalgias.        L leg pain   Neurological: Positive for weakness and numbness.        L leg       I have reviewed the review of systems as documented by my MA.      Objective     Vitals:    07/18/22 1324   BP: 122/85   Cuff Size: Adult   Pulse: 96   Temp: 98 °F (36.7 °C)   SpO2: 99%   Weight: 77.1 kg (170 lb)   Height: 172.7 cm (67.99\")     Body mass index is 25.86 kg/m².      Physical Exam  Neurological:      Mental Status: He is alert and oriented to person, place, and time.       Neurologic Exam     Mental Status   Oriented to person, place, and time.           Assessment & Plan   Independent Review of Radiographic Studies:      I personally reviewed the images from the following studies.    I again reviewed his MRI which was done about 6 months ago now.  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  By far the worst level is L4-5 where " there is also a synovial cyst.    Medical Decision Making:      I told the patient that before we can discuss surgery we would need to proceed with a lumbar myelogram.  I told the patient what a myelogram involves.  I explained that there is a 50% chance of developing a bad headache and nausea as a result of the test.  I explained that there is also a very small chance of infection, seizures, and bleeding.  I explained how we would treat a post myelogram headache including bedrest, caffeinated fluids, steroids, and blood patch.  The patient does ask to proceed.  Diagnoses and all orders for this visit:    1. Acute left-sided low back pain with left-sided sciatica (Primary)  -     IR Myelogram Lumbar Spine; Future  -     CT Lumbar Spine With Intrathecal Contrast; Future  -     XR Spine Lumbar Complete With Flex & Ext; Future  -     dexamethasone (DECADRON) 4 MG tablet; Take 2 tablets by mouth Take As Directed. Take both tablets by mouth 2 hours before myelogram  Dispense: 2 tablet; Refill: 0      Return for After radiology test.

## 2022-07-19 NOTE — PROGRESS NOTES
07/26/22 0002   Pre-Procedure Phone Call   Procedure Time Verified Yes   Arrival Time 0615   Medical History Reviewed No   NPO Status Reinforced Yes   Ride and Caregiver Arranged Yes   Patient Knows to Bring Current Medications   (No changes in medications since last reviewed. Decadron RX'd.)   Bring Outside Films Requested   (Dr Roper patient)

## 2022-07-20 ENCOUNTER — TELEPHONE (OUTPATIENT)
Dept: NEUROSURGERY | Facility: CLINIC | Age: 87
End: 2022-07-20

## 2022-07-20 NOTE — TELEPHONE ENCOUNTER
Pt's wife called about dexamethasone 4 mg prior to myelogram. It was sent to mail order. I called the dexamethasone into his local pharmacy.

## 2022-07-26 ENCOUNTER — HOSPITAL ENCOUNTER (OUTPATIENT)
Dept: GENERAL RADIOLOGY | Facility: HOSPITAL | Age: 87
Discharge: HOME | End: 2022-07-26

## 2022-07-26 ENCOUNTER — HOSPITAL ENCOUNTER (OUTPATIENT)
Dept: CT IMAGING | Facility: HOSPITAL | Age: 87
Discharge: HOME | End: 2022-07-26

## 2022-07-26 VITALS
SYSTOLIC BLOOD PRESSURE: 115 MMHG | WEIGHT: 163 LBS | DIASTOLIC BLOOD PRESSURE: 62 MMHG | BODY MASS INDEX: 24.14 KG/M2 | RESPIRATION RATE: 16 BRPM | HEIGHT: 69 IN | OXYGEN SATURATION: 97 % | TEMPERATURE: 97.7 F | HEART RATE: 62 BPM

## 2022-07-26 DIAGNOSIS — M54.42 ACUTE LEFT-SIDED LOW BACK PAIN WITH LEFT-SIDED SCIATICA: ICD-10-CM

## 2022-07-26 PROCEDURE — 62304 MYELOGRAPHY LUMBAR INJECTION: CPT

## 2022-07-26 PROCEDURE — 72240 MYELOGRAPHY NECK SPINE: CPT

## 2022-07-26 PROCEDURE — 72132 CT LUMBAR SPINE W/DYE: CPT

## 2022-07-26 PROCEDURE — 0 LIDOCAINE 1 % SOLUTION: Performed by: NEUROLOGICAL SURGERY

## 2022-07-26 PROCEDURE — 72114 X-RAY EXAM L-S SPINE BENDING: CPT

## 2022-07-26 PROCEDURE — 62284 INJECTION FOR MYELOGRAM: CPT | Performed by: NEUROLOGICAL SURGERY

## 2022-07-26 PROCEDURE — 0 IOPAMIDOL 41 % SOLUTION: Performed by: NEUROLOGICAL SURGERY

## 2022-07-26 RX ORDER — ACETAMINOPHEN 325 MG/1
650 TABLET ORAL EVERY 4 HOURS PRN
Status: DISCONTINUED | OUTPATIENT
Start: 2022-07-26 | End: 2022-07-27 | Stop reason: HOSPADM

## 2022-07-26 RX ORDER — HYDROCODONE BITARTRATE AND ACETAMINOPHEN 5; 325 MG/1; MG/1
1 TABLET ORAL EVERY 4 HOURS PRN
Status: DISCONTINUED | OUTPATIENT
Start: 2022-07-26 | End: 2022-07-27 | Stop reason: HOSPADM

## 2022-07-26 RX ORDER — LIDOCAINE HYDROCHLORIDE 10 MG/ML
10 INJECTION, SOLUTION INFILTRATION; PERINEURAL ONCE
Status: COMPLETED | OUTPATIENT
Start: 2022-07-26 | End: 2022-07-26

## 2022-07-26 RX ADMIN — IOPAMIDOL 20 ML: 408 INJECTION, SOLUTION INTRATHECAL at 07:02

## 2022-07-26 RX ADMIN — LIDOCAINE HYDROCHLORIDE 3 ML: 10 INJECTION, SOLUTION INFILTRATION; PERINEURAL at 07:01

## 2022-07-26 NOTE — NURSING NOTE
Pt arrived to Radiology triage [place] [facility] for Lumbar Myelogram.   Pt wearing a mask as well as this RN for any bedside care.

## 2022-07-26 NOTE — DISCHARGE INSTRUCTIONS
EDUCATION /DISCHARGE INSTRUCTIONS:    A myelogram is a special radiology procedure of the spinal cord, spinal nerves and other related structures.  You will be awake during the examination.  An area of your lower back will be cleansed with an antiseptic solution.  The physician will inject a numbing medication in your lower back.  While your back is numb, a needle will be placed in the lower back area.  A small amount of spinal fluid may be withdrawn and sent to the lab if ordered by your physician. While the needle is in the back, an injection of a contrast material (xray dye) will be given through the needle.  The contrast material will allow the physician to see the spinal cord and spinal nerves.  Once injected, the needle will be removed and a band aid will be placed over the injection site.  The table will be tilted during the process to allow the contrast material to flow to particular areas in the spine.  Following the injection and xrays, you will be taken to the CT scanner where more pictures will be taken. After the procedure is finished, the contrast material will be absorbed by your body and eliminated through your kidneys.  The radiologist will study and interpret your myelogram and send the results to your physician.  Procedure risks of a myelogram include, but are not limited to:  *  Bleeding   *  Seizure  *  Infection   *  Headache, possibly severe requiring a blood patch  *  Contrast reaction  *  Nerve or cord injury  *  Paralysis and death    Benefits of the procedure:  *  Best examination for delineating pathology related to spinal cord compression from a disc and/or nerve root compression  Alternatives to the procedure:  MRI - a non invasive procedure requiring intravenous contrast injection.  Cannot be done on patients with certain pacemakers or metal in the body.  MRI risks include possible reaction to the contrast material, movement of metal located in the body.Benefit to MRI:  Non-invasive  and usually painless procedure.  THIS EDUCATION INFORMATION WAS REVIEWED PRIOR TO PROCEDURE AND CONSENT. Patient initials___DRM____Time___0630____    24 hour rest period ends tomorrow, July 27th after 1000 am.    Important information following your myelogram:  * ACTIVITY:   *  You may sit up in the car to go home.  *  When you get home, remain on bed rest (flat on your back or on your side) for 24 hours. You may place a rolled up towel under your neck for support  * You may get up to the bathroom and sit up to eat and drink then lie back down  * Drink additional fluids for 24 hours after the myelogram.   * Continue to drink additional fluids for the next 2-3 days. Water and caffeinated beverages are encouraged.  * Remain less active for the next two to three days.  * Do not drive for 24 hours following a myelogram.  * You may remove the bandage and shower in the morning.    CALL YOUR PHYSICIAN FOR THE FOLLOWING:  * Pain at the injection site  * Redness, swelling, bruising or drainage at the injection site.  * A fever by mouth of 101.0 or any new symptoms  Headaches are a common side effect after a myelogram.  If you get a headache, you should stay flat in bed and drink plenty of fluids. If the headache persists and does not go away with rest/medication, CALL Dr. Roper at [phone].

## 2022-07-27 ENCOUNTER — TELEPHONE (OUTPATIENT)
Dept: INTERVENTIONAL RADIOLOGY/VASCULAR | Facility: HOSPITAL | Age: 87
End: 2022-07-27

## 2022-08-11 ENCOUNTER — PREP FOR SURGERY (OUTPATIENT)
Dept: OTHER | Facility: HOSPITAL | Age: 87
End: 2022-08-11

## 2022-08-11 ENCOUNTER — OFFICE VISIT (OUTPATIENT)
Dept: NEUROSURGERY | Facility: CLINIC | Age: 87
End: 2022-08-11

## 2022-08-11 VITALS
HEIGHT: 69 IN | HEART RATE: 89 BPM | OXYGEN SATURATION: 98 % | DIASTOLIC BLOOD PRESSURE: 78 MMHG | WEIGHT: 163 LBS | BODY MASS INDEX: 24.14 KG/M2 | TEMPERATURE: 97.8 F | SYSTOLIC BLOOD PRESSURE: 132 MMHG

## 2022-08-11 DIAGNOSIS — M54.42 ACUTE LEFT-SIDED LOW BACK PAIN WITH LEFT-SIDED SCIATICA: Primary | ICD-10-CM

## 2022-08-11 PROCEDURE — 99214 OFFICE O/P EST MOD 30 MIN: CPT | Performed by: NEUROLOGICAL SURGERY

## 2022-08-11 RX ORDER — CEFAZOLIN SODIUM 2 G/100ML
2 INJECTION, SOLUTION INTRAVENOUS ONCE
Status: CANCELLED | OUTPATIENT
Start: 2022-08-19 | End: 2022-08-11

## 2022-08-11 NOTE — H&P (VIEW-ONLY)
"Subjective   Patient ID: Gab Maloney is a 86 y.o. male is here today for follow-up with a Lumbar Myelogram that was done on 07/26/2022 for low back pain.    Today patient states that his symptoms have worsened. Patient is having low back pain that radiates to the L leg.     Patient, Provider, and MA are all wearing a mask in our office today.     History of Present Illness     This patient continues with pain in his back and radiation into his left buttock and down his posterior lateral thigh on the left.  The pain is quite severe.    The following portions of the patient's history were reviewed and updated as appropriate: allergies, current medications, past family history, past medical history, past social history, past surgical history and problem list.    Review of Systems   Constitutional: Negative for chills and fever.   HENT: Negative for congestion.    Genitourinary: Negative for difficulty urinating and dysuria.   Musculoskeletal: Positive for back pain, gait problem and myalgias.   Neurological: Positive for weakness and numbness.       I have reviewed the review of systems as documented by my MA.      Objective     Vitals:    08/11/22 1258   BP: 132/78   Cuff Size: Adult   Pulse: 89   Temp: 97.8 °F (36.6 °C)   SpO2: 98%   Weight: 73.9 kg (163 lb)   Height: 175.3 cm (69\")     Body mass index is 24.07 kg/m².      Physical Exam  Neurological:      Mental Status: He is alert and oriented to person, place, and time.       Neurologic Exam     Mental Status   Oriented to person, place, and time.           Assessment & Plan   Independent Review of Radiographic Studies:      I personally reviewed the images from the following studies.    I reviewed his plain films, myelogram, and CT scan myself.  The plain films show multilevel degenerative disease and fairly marked lumbar scoliosis.  There is no overt instability on flexion and extension.  There are only 4 lumbar vertebrae.  There is a vestigial interspace " at L5-S1.  On the myelogram itself there is pretty severe stenosis at L3-4 in particular.  The levels above that look pretty good.  L4-5 and L5-S1 looks pretty good as well.  On the lateral film there is severe stenosis.  The standing films really do not change much.  On the post myelographic CT scan the lower thoracic spine down to L1 looks okay.  At L1 to there is a little left lateral recess stenosis and a little synovial cyst but not very large and no neural compression.  L2-3 also shows some left-sided foraminal and lateral recess stenosis but not severe.  L3-4 is quite stenotic and that is the level that the.  L4-5 for the most part looks okay.  There is a vestigial L5-S1 interspace.    Medical Decision Making:      I told the patient and his wife about the imaging.  Told him that from my point of view I think we could try a minimally invasive surgery with a decompression at L3-4 on the left side with some crossover to the right that would have a reasonable chance of helping him feel better.  I told the patient about the risks, complications and expected outcome of the lumbar surgery.  I explained that there was an 80% chance of getting rid of the pain in the leg.  I explained that there would still be back pain after the surgery.  Initially this will be quite severe but will improve over time.  There is a 2 or 3% chance of infection, bleeding, CSF leak, damage to the nerve as a result of surgery, paralysis, as well as anesthetic risk.  There is a 5% chance of late instability which could require a fusion later on and a 10% chance of recurrent disc herniation.  We discussed the postoperative hospital and home course.    He will need to be scheduled for a: Left lumbar 3 to lumbar 4 laminectomy with metrx    Diagnoses and all orders for this visit:    1. Acute left-sided low back pain with left-sided sciatica (Primary)      Return for 2-3 week post op.

## 2022-08-16 ENCOUNTER — OFFICE VISIT (OUTPATIENT)
Dept: FAMILY MEDICINE CLINIC | Facility: CLINIC | Age: 87
End: 2022-08-16

## 2022-08-16 VITALS
OXYGEN SATURATION: 98 % | HEIGHT: 69 IN | BODY MASS INDEX: 24.88 KG/M2 | WEIGHT: 168 LBS | RESPIRATION RATE: 18 BRPM | HEART RATE: 62 BPM | SYSTOLIC BLOOD PRESSURE: 122 MMHG | TEMPERATURE: 97.4 F | DIASTOLIC BLOOD PRESSURE: 68 MMHG

## 2022-08-16 DIAGNOSIS — M54.42 ACUTE LEFT-SIDED LOW BACK PAIN WITH LEFT-SIDED SCIATICA: ICD-10-CM

## 2022-08-16 DIAGNOSIS — Z01.818 PREOP EXAMINATION: Primary | ICD-10-CM

## 2022-08-16 PROBLEM — M47.816 LUMBAR SPONDYLOSIS: Status: ACTIVE | Noted: 2022-08-16

## 2022-08-16 PROBLEM — G89.4 CHRONIC PAIN DISORDER: Status: ACTIVE | Noted: 2022-08-16

## 2022-08-16 PROCEDURE — 99214 OFFICE O/P EST MOD 30 MIN: CPT | Performed by: FAMILY MEDICINE

## 2022-08-16 NOTE — PROGRESS NOTES
"Chief Complaint  Surgical Clearance (Back surgery on Friday, needs clearance /Pt states he is getting EKG and labs at the hospital tomorrow )    Subjective          Gab presents to [facility]  For preoperative clearance for upcoming back surgery this Friday. Neurosurgery note reviewed.  Patient denies any past history of anesthetic problems in the past.  He denies any history of easy bleeding or bruising.  He plans on having preoperative work-up later this week.  Other than age, should be reasonable surgical risk.        Objective   Vital Signs:   Vitals:    08/16/22 0856   BP: 122/68   Pulse: 62   Resp: 18   Temp: 97.4 °F (36.3 °C)   SpO2: 98%   Weight: 76.2 kg (168 lb)   Height: 175.3 cm (69\")        BMI is within normal parameters. No other follow-up for BMI required.        Physical Exam  Vitals reviewed.   Constitutional:       General: He is not in acute distress.  Eyes:      General: Lids are normal.      Conjunctiva/sclera: Conjunctivae normal.   Neck:      Vascular: No carotid bruit.      Trachea: No tracheal deviation.   Cardiovascular:      Rate and Rhythm: Normal rate and regular rhythm.      Heart sounds: Normal heart sounds. No murmur heard.  Pulmonary:      Effort: Pulmonary effort is normal.      Breath sounds: Normal breath sounds.   Musculoskeletal:      Right lower leg: No edema.      Left lower leg: No edema.   Skin:     General: Skin is warm and dry.   Neurological:      Mental Status: He is alert. He is not disoriented.   Psychiatric:         Speech: Speech normal.         Behavior: Behavior normal. Behavior is cooperative.          Result Review :     The following data was reviewed by: Franck Moreno MD on 08/16/2022:  Office Visit with Jerome Roper MD (08/11/2022)           Assessment and Plan    Diagnoses and all orders for this visit:    1. Preop examination (Primary)  Comments:  cleared for surgery if all preoperative studies are satisfactory.     2. " Acute left-sided low back pain with left-sided sciatica        Follow Up   No follow-ups on file.  Patient was given instructions and counseling regarding his condition or for health maintenance advice. Please see specific information pulled into the AVS if appropriate.

## 2022-08-17 ENCOUNTER — PRE-ADMISSION TESTING (OUTPATIENT)
Dept: PREADMISSION TESTING | Facility: HOSPITAL | Age: 87
End: 2022-08-17

## 2022-08-17 VITALS
SYSTOLIC BLOOD PRESSURE: 143 MMHG | DIASTOLIC BLOOD PRESSURE: 77 MMHG | RESPIRATION RATE: 16 BRPM | OXYGEN SATURATION: 97 % | BODY MASS INDEX: 24.63 KG/M2 | TEMPERATURE: 98.5 F | HEART RATE: 80 BPM | WEIGHT: 166.3 LBS | HEIGHT: 69 IN

## 2022-08-17 DIAGNOSIS — M54.42 ACUTE LEFT-SIDED LOW BACK PAIN WITH LEFT-SIDED SCIATICA: ICD-10-CM

## 2022-08-17 LAB
ANION GAP SERPL CALCULATED.3IONS-SCNC: 10.9 MMOL/L (ref 5–15)
BUN SERPL-MCNC: 22 MG/DL (ref 8–23)
BUN/CREAT SERPL: 21 (ref 7–25)
CALCIUM SPEC-SCNC: 9.2 MG/DL (ref 8.6–10.5)
CHLORIDE SERPL-SCNC: 102 MMOL/L (ref 98–107)
CO2 SERPL-SCNC: 24.1 MMOL/L (ref 22–29)
CREAT SERPL-MCNC: 1.05 MG/DL (ref 0.76–1.27)
DEPRECATED RDW RBC AUTO: 41.7 FL (ref 37–54)
EGFRCR SERPLBLD CKD-EPI 2021: 69.1 ML/MIN/1.73
ERYTHROCYTE [DISTWIDTH] IN BLOOD BY AUTOMATED COUNT: 12.6 % (ref 12.3–15.4)
GLUCOSE SERPL-MCNC: 91 MG/DL (ref 65–99)
HCT VFR BLD AUTO: 38.3 % (ref 37.5–51)
HGB BLD-MCNC: 12.9 G/DL (ref 13–17.7)
MCH RBC QN AUTO: 30.9 PG (ref 26.6–33)
MCHC RBC AUTO-ENTMCNC: 33.7 G/DL (ref 31.5–35.7)
MCV RBC AUTO: 91.8 FL (ref 79–97)
PLATELET # BLD AUTO: 278 10*3/MM3 (ref 140–450)
PMV BLD AUTO: 9.6 FL (ref 6–12)
POTASSIUM SERPL-SCNC: 4.5 MMOL/L (ref 3.5–5.2)
QT INTERVAL: 395 MS
RBC # BLD AUTO: 4.17 10*6/MM3 (ref 4.14–5.8)
SARS-COV-2 ORF1AB RESP QL NAA+PROBE: NOT DETECTED
SODIUM SERPL-SCNC: 137 MMOL/L (ref 136–145)
WBC NRBC COR # BLD: 6.83 10*3/MM3 (ref 3.4–10.8)

## 2022-08-17 PROCEDURE — 36415 COLL VENOUS BLD VENIPUNCTURE: CPT

## 2022-08-17 PROCEDURE — C9803 HOPD COVID-19 SPEC COLLECT: HCPCS

## 2022-08-17 PROCEDURE — 93005 ELECTROCARDIOGRAM TRACING: CPT

## 2022-08-17 PROCEDURE — 80048 BASIC METABOLIC PNL TOTAL CA: CPT

## 2022-08-17 PROCEDURE — 93010 ELECTROCARDIOGRAM REPORT: CPT | Performed by: INTERNAL MEDICINE

## 2022-08-17 PROCEDURE — 85027 COMPLETE CBC AUTOMATED: CPT

## 2022-08-17 PROCEDURE — U0004 COV-19 TEST NON-CDC HGH THRU: HCPCS

## 2022-08-17 RX ORDER — CHLORHEXIDINE GLUCONATE 500 MG/1
CLOTH TOPICAL
COMMUNITY
End: 2022-08-19 | Stop reason: HOSPADM

## 2022-08-17 NOTE — DISCHARGE INSTRUCTIONS
Take the following medications the morning of surgery:    NONE    ARRIVE AT 0600.      If you are on prescription narcotic pain medication to control your pain you may also take that medication the morning of surgery.    General Instructions:  Do not eat solid food after midnight the night before surgery.  You may drink clear liquids day of surgery but must stop at least one hour before your hospital arrival time.  It is beneficial for you to have a clear drink that contains carbohydrates the day of surgery.  We suggest a 12 to 20 ounce bottle of Gatorade or Powerade for non-diabetic patients or a 12 to 20 ounce bottle of G2 or Powerade Zero for diabetic patients. (Pediatric patients, are not advised to drink a 12 to 20 ounce carbohydrate drink)    Clear liquids are liquids you can see through.  Nothing red in color.     Plain water                               Sports drinks  Sodas                                   Gelatin (Jell-O)  Fruit juices without pulp such as white grape juice and apple juice  Popsicles that contain no fruit or yogurt  Tea or coffee (no cream or milk added)  Gatorade / Powerade  G2 / Powerade Zero    Infants may have breast milk up to four hours before surgery.  Infants drinking formula may drink formula up to six hours before surgery.   Patients who avoid smoking, chewing tobacco and alcohol for 4 weeks prior to surgery have a reduced risk of post-operative complications.  Quit smoking as many days before surgery as you can.  Do not smoke, use chewing tobacco or drink alcohol the day of surgery.   If applicable bring your C-PAP/ BI-PAP machine.  Bring any papers given to you in the doctor’s office.  Wear clean comfortable clothes.  Do not wear contact lenses, false eyelashes or make-up.  Bring a case for your glasses.   Bring crutches or walker if applicable.  Remove all piercings.  Leave jewelry and any other valuables at home.  Hair extensions with metal clips must be removed prior to  surgery.  The Pre-Admission Testing nurse will instruct you to bring medications if unable to obtain an accurate list in Pre-Admission Testing.        If you were given a blood bank ID arm band remember to bring it with you the day of surgery.    Preventing a Surgical Site Infection:  For 2 to 3 days before surgery, avoid shaving with a razor because the razor can irritate skin and make it easier to develop an infection.    Any areas of open skin can increase the risk of a post-operative wound infection by allowing bacteria to enter and travel throughout the body.  Notify your surgeon if you have any skin wounds / rashes even if it is not near the expected surgical site.  The area will need assessed to determine if surgery should be delayed until it is healed.  The night prior to surgery shower using a fresh bar of anti-bacterial soap (such as Dial) and clean washcloth.  Sleep in a clean bed with clean clothing.  Do not allow pets to sleep with you.  Shower on the morning of surgery using a fresh bar of anti-bacterial soap (such as Dial) and clean washcloth.  Dry with a clean towel and dress in clean clothing.  Ask your surgeon if you will be receiving antibiotics prior to surgery.  Make sure you, your family, and all healthcare providers clean their hands with soap and water or an alcohol based hand  before caring for you or your wound.    Day of surgery:  Your arrival time is approximately two hours before your scheduled surgery time.  Upon arrival, a Pre-op nurse and Anesthesiologist will review your health history, obtain vital signs, and answer questions you may have.  The only belongings needed at this time will be a list of your home medications and if applicable your C-PAP/BI-PAP machine.  A Pre-op nurse will start an IV and you may receive medication in preparation for surgery, including something to help you relax.     Please be aware that surgery does come with discomfort.  We want to make every  effort to control your discomfort so please discuss any uncontrolled symptoms with your nurse.   Your doctor will most likely have prescribed pain medications.      If you are going home after surgery you will receive individualized written care instructions before being discharged.  A responsible adult must drive you to and from the hospital on the day of your surgery and stay with you for 24 hours.  Discharge prescriptions can be filled by the hospital pharmacy during regular pharmacy hours.  If you are having surgery late in the day/evening your prescription may be e-prescribed to your pharmacy.  Please verify your pharmacy hours or chose a 24 hour pharmacy to avoid not having access to your prescription because your pharmacy has closed for the day.    If you are staying overnight following surgery, you will be transported to your hospital room following the recovery period.  [facility] has all private rooms.    If you have any questions please call Pre-Admission Testing at [phone].  Deductibles and co-payments are collected on the day of service. Please be prepared to pay the required co-pay, deductible or deposit on the day of service as defined by your plan.    Patient Education for Self-Quarantine Process    Following your COVID testing, we strongly recommend that you wear a mask when you are with other people and practice social distancing.   Limit your activities to only required outings.  Wash your hands with soap and water frequently for at least 20 seconds.   Avoid touching your eyes, nose and mouth with unwashed hands.  Do not share anything - utensils, drinking glasses, food from the same bowl.   Sanitize household surfaces daily. Include all high touch areas (door handles, light switches, phones, countertops, etc.)    Call your surgeon immediately if you experience any of the following symptoms:  Sore Throat  Shortness of Breath or difficulty breathing  Cough  Chills  Body  soreness or muscle pain  Headache  Fever  New loss of taste or smell  Do not arrive for your surgery ill.  Your procedure will need to be rescheduled to another time.  You will need to call your physician before the day of surgery to avoid any unnecessary exposure to hospital staff as well as other patients.     CHLORHEXIDINE CLOTH INSTRUCTIONS  The morning of surgery follow these instructions using the Chlorhexidine cloths you've been given.  These steps reduce bacteria on the body.  Do not use the cloths near your eyes, ears mouth, genitalia or on open wounds.  Throw the cloths away after use but do not try to flush them down a toilet.      Open and remove one cloth at a time from the package.    Leave the cloth unfolded and begin the bathing.  Massage the skin with the cloths using gentle pressure to remove bacteria.  Do not scrub harshly.   Follow the steps below with one 2% CHG cloth per area (6 total cloths).  One cloth for neck, shoulders and chest.  One cloth for both arms, hands, fingers and underarms (do underarms last).  One cloth for the abdomen followed by groin.  One cloth for right leg and foot including between the toes.  One cloth for left leg and foot including between the toes.  The last cloth is to be used for the back of the neck, back and buttocks.    Allow the CHG to air dry 3 minutes on the skin which will give it time to work and decrease the chance of irritation.  The skin may feel sticky until it is dry.  Do not rinse with water or any other liquid or you will lose the beneficial effects of the CHG.  If mild skin irritation occurs, do rinse the skin to remove the CHG.  Report this to the nurse at time of admission.  Do not apply lotions, creams, ointments, deodorants or perfumes after using the clothes. Dress in clean clothes before coming to the hospital.

## 2022-08-19 ENCOUNTER — HOSPITAL ENCOUNTER (OUTPATIENT)
Facility: HOSPITAL | Age: 87
Setting detail: HOSPITAL OUTPATIENT SURGERY
Discharge: HOME | End: 2022-08-19
Attending: NEUROLOGICAL SURGERY | Admitting: NEUROLOGICAL SURGERY

## 2022-08-19 ENCOUNTER — APPOINTMENT (OUTPATIENT)
Dept: GENERAL RADIOLOGY | Facility: HOSPITAL | Age: 87
End: 2022-08-19

## 2022-08-19 ENCOUNTER — ANESTHESIA (OUTPATIENT)
Dept: PERIOP | Facility: HOSPITAL | Age: 87
End: 2022-08-19

## 2022-08-19 ENCOUNTER — ANESTHESIA EVENT (OUTPATIENT)
Dept: PERIOP | Facility: HOSPITAL | Age: 87
End: 2022-08-19

## 2022-08-19 VITALS
HEART RATE: 66 BPM | WEIGHT: 164.4 LBS | HEIGHT: 69 IN | DIASTOLIC BLOOD PRESSURE: 66 MMHG | RESPIRATION RATE: 17 BRPM | OXYGEN SATURATION: 94 % | TEMPERATURE: 97.8 F | BODY MASS INDEX: 24.35 KG/M2 | SYSTOLIC BLOOD PRESSURE: 122 MMHG

## 2022-08-19 DIAGNOSIS — M54.42 ACUTE LEFT-SIDED LOW BACK PAIN WITH LEFT-SIDED SCIATICA: ICD-10-CM

## 2022-08-19 DIAGNOSIS — M47.816 LUMBAR SPONDYLOSIS: Primary | ICD-10-CM

## 2022-08-19 PROCEDURE — C1713 ANCHOR/SCREW BN/BN,TIS/BN: HCPCS | Performed by: NEUROLOGICAL SURGERY

## 2022-08-19 PROCEDURE — 63047 LAM FACETEC & FORAMOT LUMBAR: CPT | Performed by: NEUROLOGICAL SURGERY

## 2022-08-19 PROCEDURE — 25010000002 VANCOMYCIN 1 G RECONSTITUTED SOLUTION 1 EACH VIAL: Performed by: NEUROLOGICAL SURGERY

## 2022-08-19 PROCEDURE — 25010000002 CEFAZOLIN IN DEXTROSE 2-4 GM/100ML-% SOLUTION: Performed by: NEUROLOGICAL SURGERY

## 2022-08-19 PROCEDURE — 76000 FLUOROSCOPY <1 HR PHYS/QHP: CPT

## 2022-08-19 PROCEDURE — 72100 X-RAY EXAM L-S SPINE 2/3 VWS: CPT

## 2022-08-19 PROCEDURE — 25010000002 FENTANYL CITRATE (PF) 50 MCG/ML SOLUTION: Performed by: NURSE ANESTHETIST, CERTIFIED REGISTERED

## 2022-08-19 PROCEDURE — 25010000002 PROPOFOL 10 MG/ML EMULSION: Performed by: NURSE ANESTHETIST, CERTIFIED REGISTERED

## 2022-08-19 PROCEDURE — 63047 LAM FACETEC & FORAMOT LUMBAR: CPT | Performed by: SPECIALIST/TECHNOLOGIST, OTHER

## 2022-08-19 PROCEDURE — 25010000002 HYDROMORPHONE PER 4 MG: Performed by: NURSE ANESTHETIST, CERTIFIED REGISTERED

## 2022-08-19 PROCEDURE — 25010000002 NEOSTIGMINE 5 MG/10ML SOLUTION: Performed by: NURSE ANESTHETIST, CERTIFIED REGISTERED

## 2022-08-19 DEVICE — SSC BONE WAX
Type: IMPLANTABLE DEVICE | Site: SPINE LUMBAR | Status: FUNCTIONAL
Brand: SSC BONE WAX

## 2022-08-19 DEVICE — FLOSEAL HEMOSTATIC MATRIX, 5ML
Type: IMPLANTABLE DEVICE | Site: SPINE LUMBAR | Status: FUNCTIONAL
Brand: FLOSEAL HEMOSTATIC MATRIX

## 2022-08-19 RX ORDER — LABETALOL HYDROCHLORIDE 5 MG/ML
5 INJECTION, SOLUTION INTRAVENOUS
Status: DISCONTINUED | OUTPATIENT
Start: 2022-08-19 | End: 2022-08-19 | Stop reason: HOSPADM

## 2022-08-19 RX ORDER — HYDRALAZINE HYDROCHLORIDE 20 MG/ML
5 INJECTION INTRAMUSCULAR; INTRAVENOUS
Status: DISCONTINUED | OUTPATIENT
Start: 2022-08-19 | End: 2022-08-19 | Stop reason: HOSPADM

## 2022-08-19 RX ORDER — KETAMINE HYDROCHLORIDE 10 MG/ML
INJECTION INTRAMUSCULAR; INTRAVENOUS AS NEEDED
Status: DISCONTINUED | OUTPATIENT
Start: 2022-08-19 | End: 2022-08-19 | Stop reason: SURG

## 2022-08-19 RX ORDER — SODIUM CHLORIDE 0.9 % (FLUSH) 0.9 %
3-10 SYRINGE (ML) INJECTION AS NEEDED
Status: DISCONTINUED | OUTPATIENT
Start: 2022-08-19 | End: 2022-08-19 | Stop reason: HOSPADM

## 2022-08-19 RX ORDER — HYDROMORPHONE HYDROCHLORIDE 1 MG/ML
0.5 INJECTION, SOLUTION INTRAMUSCULAR; INTRAVENOUS; SUBCUTANEOUS
Status: DISCONTINUED | OUTPATIENT
Start: 2022-08-19 | End: 2022-08-19 | Stop reason: HOSPADM

## 2022-08-19 RX ORDER — CEFAZOLIN SODIUM 2 G/100ML
2 INJECTION, SOLUTION INTRAVENOUS ONCE
Status: COMPLETED | OUTPATIENT
Start: 2022-08-19 | End: 2022-08-19

## 2022-08-19 RX ORDER — DIPHENHYDRAMINE HYDROCHLORIDE 50 MG/ML
12.5 INJECTION INTRAMUSCULAR; INTRAVENOUS
Status: DISCONTINUED | OUTPATIENT
Start: 2022-08-19 | End: 2022-08-19 | Stop reason: HOSPADM

## 2022-08-19 RX ORDER — SODIUM CHLORIDE 0.9 % (FLUSH) 0.9 %
3 SYRINGE (ML) INJECTION EVERY 12 HOURS SCHEDULED
Status: DISCONTINUED | OUTPATIENT
Start: 2022-08-19 | End: 2022-08-19 | Stop reason: HOSPADM

## 2022-08-19 RX ORDER — FENTANYL CITRATE 50 UG/ML
50 INJECTION, SOLUTION INTRAMUSCULAR; INTRAVENOUS
Status: DISCONTINUED | OUTPATIENT
Start: 2022-08-19 | End: 2022-08-19 | Stop reason: HOSPADM

## 2022-08-19 RX ORDER — LIDOCAINE HYDROCHLORIDE 20 MG/ML
INJECTION, SOLUTION INFILTRATION; PERINEURAL AS NEEDED
Status: DISCONTINUED | OUTPATIENT
Start: 2022-08-19 | End: 2022-08-19 | Stop reason: SURG

## 2022-08-19 RX ORDER — ONDANSETRON 2 MG/ML
4 INJECTION INTRAMUSCULAR; INTRAVENOUS ONCE AS NEEDED
Status: DISCONTINUED | OUTPATIENT
Start: 2022-08-19 | End: 2022-08-19 | Stop reason: HOSPADM

## 2022-08-19 RX ORDER — HYDROCODONE BITARTRATE AND ACETAMINOPHEN 5; 325 MG/1; MG/1
1 TABLET ORAL EVERY 4 HOURS PRN
Qty: 35 TABLET | Refills: 0 | Status: SHIPPED | OUTPATIENT
Start: 2022-08-19 | End: 2023-01-05

## 2022-08-19 RX ORDER — FENTANYL CITRATE 50 UG/ML
INJECTION, SOLUTION INTRAMUSCULAR; INTRAVENOUS AS NEEDED
Status: DISCONTINUED | OUTPATIENT
Start: 2022-08-19 | End: 2022-08-19 | Stop reason: SURG

## 2022-08-19 RX ORDER — SODIUM CHLORIDE, SODIUM LACTATE, POTASSIUM CHLORIDE, CALCIUM CHLORIDE 600; 310; 30; 20 MG/100ML; MG/100ML; MG/100ML; MG/100ML
9 INJECTION, SOLUTION INTRAVENOUS CONTINUOUS
Status: DISCONTINUED | OUTPATIENT
Start: 2022-08-19 | End: 2022-08-19 | Stop reason: HOSPADM

## 2022-08-19 RX ORDER — DIPHENHYDRAMINE HCL 25 MG
25 CAPSULE ORAL
Status: DISCONTINUED | OUTPATIENT
Start: 2022-08-19 | End: 2022-08-19 | Stop reason: HOSPADM

## 2022-08-19 RX ORDER — MIDAZOLAM HYDROCHLORIDE 1 MG/ML
0.5 INJECTION INTRAMUSCULAR; INTRAVENOUS
Status: DISCONTINUED | OUTPATIENT
Start: 2022-08-19 | End: 2022-08-19 | Stop reason: HOSPADM

## 2022-08-19 RX ORDER — CEPHALEXIN 500 MG/1
500 CAPSULE ORAL 4 TIMES DAILY
Qty: 20 CAPSULE | Refills: 0 | Status: SHIPPED | OUTPATIENT
Start: 2022-08-19 | End: 2022-08-24

## 2022-08-19 RX ORDER — EPHEDRINE SULFATE 50 MG/ML
5 INJECTION, SOLUTION INTRAVENOUS ONCE AS NEEDED
Status: DISCONTINUED | OUTPATIENT
Start: 2022-08-19 | End: 2022-08-19 | Stop reason: HOSPADM

## 2022-08-19 RX ORDER — FAMOTIDINE 10 MG/ML
20 INJECTION, SOLUTION INTRAVENOUS ONCE
Status: COMPLETED | OUTPATIENT
Start: 2022-08-19 | End: 2022-08-19

## 2022-08-19 RX ORDER — PROMETHAZINE HYDROCHLORIDE 25 MG/1
25 TABLET ORAL ONCE AS NEEDED
Status: DISCONTINUED | OUTPATIENT
Start: 2022-08-19 | End: 2022-08-19 | Stop reason: HOSPADM

## 2022-08-19 RX ORDER — OXYCODONE AND ACETAMINOPHEN 7.5; 325 MG/1; MG/1
1 TABLET ORAL EVERY 4 HOURS PRN
Status: DISCONTINUED | OUTPATIENT
Start: 2022-08-19 | End: 2022-08-19 | Stop reason: HOSPADM

## 2022-08-19 RX ORDER — NEOSTIGMINE METHYLSULFATE 0.5 MG/ML
INJECTION, SOLUTION INTRAVENOUS AS NEEDED
Status: DISCONTINUED | OUTPATIENT
Start: 2022-08-19 | End: 2022-08-19 | Stop reason: SURG

## 2022-08-19 RX ORDER — PROPOFOL 10 MG/ML
VIAL (ML) INTRAVENOUS AS NEEDED
Status: DISCONTINUED | OUTPATIENT
Start: 2022-08-19 | End: 2022-08-19 | Stop reason: SURG

## 2022-08-19 RX ORDER — LIDOCAINE HYDROCHLORIDE 10 MG/ML
0.5 INJECTION, SOLUTION EPIDURAL; INFILTRATION; INTRACAUDAL; PERINEURAL ONCE AS NEEDED
Status: DISCONTINUED | OUTPATIENT
Start: 2022-08-19 | End: 2022-08-19 | Stop reason: HOSPADM

## 2022-08-19 RX ORDER — GLYCOPYRROLATE 0.2 MG/ML
INJECTION INTRAMUSCULAR; INTRAVENOUS AS NEEDED
Status: DISCONTINUED | OUTPATIENT
Start: 2022-08-19 | End: 2022-08-19 | Stop reason: SURG

## 2022-08-19 RX ORDER — NALOXONE HCL 0.4 MG/ML
0.2 VIAL (ML) INJECTION AS NEEDED
Status: DISCONTINUED | OUTPATIENT
Start: 2022-08-19 | End: 2022-08-19 | Stop reason: HOSPADM

## 2022-08-19 RX ORDER — PROMETHAZINE HYDROCHLORIDE 25 MG/1
25 SUPPOSITORY RECTAL ONCE AS NEEDED
Status: DISCONTINUED | OUTPATIENT
Start: 2022-08-19 | End: 2022-08-19 | Stop reason: HOSPADM

## 2022-08-19 RX ORDER — ROCURONIUM BROMIDE 10 MG/ML
INJECTION, SOLUTION INTRAVENOUS AS NEEDED
Status: DISCONTINUED | OUTPATIENT
Start: 2022-08-19 | End: 2022-08-19 | Stop reason: SURG

## 2022-08-19 RX ADMIN — FENTANYL CITRATE 50 MCG: 50 INJECTION INTRAMUSCULAR; INTRAVENOUS at 09:40

## 2022-08-19 RX ADMIN — KETAMINE HYDROCHLORIDE 20 MG: 10 INJECTION INTRAMUSCULAR; INTRAVENOUS at 08:29

## 2022-08-19 RX ADMIN — SODIUM CHLORIDE, POTASSIUM CHLORIDE, SODIUM LACTATE AND CALCIUM CHLORIDE 9 ML/HR: 600; 310; 30; 20 INJECTION, SOLUTION INTRAVENOUS at 06:41

## 2022-08-19 RX ADMIN — LIDOCAINE HYDROCHLORIDE 80 MG: 20 INJECTION, SOLUTION INFILTRATION; PERINEURAL at 08:04

## 2022-08-19 RX ADMIN — FAMOTIDINE 20 MG: 10 INJECTION INTRAVENOUS at 06:38

## 2022-08-19 RX ADMIN — HYDROMORPHONE HYDROCHLORIDE 0.5 MG: 1 INJECTION, SOLUTION INTRAMUSCULAR; INTRAVENOUS; SUBCUTANEOUS at 10:13

## 2022-08-19 RX ADMIN — FENTANYL CITRATE 50 MCG: 50 INJECTION INTRAMUSCULAR; INTRAVENOUS at 10:27

## 2022-08-19 RX ADMIN — ROCURONIUM BROMIDE 40 MG: 50 INJECTION INTRAVENOUS at 08:04

## 2022-08-19 RX ADMIN — NEOSTIGMINE METHYLSULFATE 3.5 MG: 0.5 INJECTION INTRAVENOUS at 09:28

## 2022-08-19 RX ADMIN — CEFAZOLIN SODIUM 2 G: 2 INJECTION, SOLUTION INTRAVENOUS at 07:53

## 2022-08-19 RX ADMIN — PROPOFOL 120 MG: 10 INJECTION, EMULSION INTRAVENOUS at 08:04

## 2022-08-19 RX ADMIN — GLYCOPYRROLATE 0.5 MG: 0.2 INJECTION INTRAMUSCULAR; INTRAVENOUS at 09:28

## 2022-08-19 RX ADMIN — FENTANYL CITRATE 50 MCG: 50 INJECTION INTRAMUSCULAR; INTRAVENOUS at 08:04

## 2022-08-19 RX ADMIN — GLYCOPYRROLATE 0.2 MG: 0.2 INJECTION INTRAMUSCULAR; INTRAVENOUS at 08:32

## 2022-08-19 RX ADMIN — FENTANYL CITRATE 50 MCG: 50 INJECTION INTRAMUSCULAR; INTRAVENOUS at 10:11

## 2022-08-19 RX ADMIN — SUGAMMADEX 200 MG: 100 INJECTION, SOLUTION INTRAVENOUS at 09:33

## 2022-08-19 RX ADMIN — KETAMINE HYDROCHLORIDE 30 MG: 10 INJECTION INTRAMUSCULAR; INTRAVENOUS at 08:04

## 2022-08-19 RX ADMIN — OXYCODONE HYDROCHLORIDE AND ACETAMINOPHEN 1 TABLET: 7.5; 325 TABLET ORAL at 10:11

## 2022-08-19 NOTE — BRIEF OP NOTE
LUMBAR DISCECTOMY POSTERIOR WITH METRIX  Progress Note    Gab ARRIOLA Maloney  8/19/2022    Pre-op Diagnosis:   Acute left-sided low back pain with left-sided sciatica [M54.42]       Post-Op Diagnosis Codes:     * Acute left-sided low back pain with left-sided sciatica [M54.42]    Procedure/CPT® Codes:        Procedure(s):  Left lumbar 3 to lumbar 4 laminectomy with metrx    Surgeon(s):  Jerome Roper MD    Anesthesia: General    Staff:   Circulator: Lexie Key RN; Lyn Peterson RN  Radiology Technologist: Destini Polo  Scrub Person: Depea Alicea  Assistant: Nathalia Dillard CSA  Assistant: Nathalia Dillard CSA      Estimated Blood Loss: 100ml    Urine Voided: 0 mL    Specimens:                None          Drains: * No LDAs found *    Findings: Severe stenosis        Complications: None      Jerome Roper MD     Date: 8/19/2022  Time: 09:53 EDT

## 2022-08-19 NOTE — ANESTHESIA POSTPROCEDURE EVALUATION
Patient: Gab Maloney    Procedure Summary     Date: 08/19/22 Room / Location: [facility] OR [address] [place] [address] MAIN OR    Anesthesia Start: 0800 Anesthesia Stop: 0942    Procedure: Left lumbar 3 to lumbar 4 laminectomy with metrx (Left Spine Lumbar) Diagnosis:       Acute left-sided low back pain with left-sided sciatica      (Acute left-sided low back pain with left-sided sciatica [M54.42])    Surgeons: Jerome Roper MD Provider: Carlos Ellis MD    Anesthesia Type: general ASA Status: 2          Anesthesia Type: general    Vitals  Vitals Value Taken Time   /92 08/19/22 1036   Temp 36.6 °C (97.8 °F) 08/19/22 1045   Pulse 66 08/19/22 1045   Resp 14 08/19/22 1035   SpO2 95 % 08/19/22 1045           Anesthesia Post Evaluation

## 2022-08-19 NOTE — ANESTHESIA PROCEDURE NOTES
Airway  Urgency: elective    Date/Time: 8/19/2022 8:07 AM  Airway not difficult    General Information and Staff    Patient location during procedure: OR  CRNA/CAA: Ashley Paula CRNA    Indications and Patient Condition  Indications for airway management: airway protection    Preoxygenated: yes  Mask difficulty assessment: 1 - vent by mask    Final Airway Details  Final airway type: endotracheal airway      Successful airway: ETT  Cuffed: yes   Successful intubation technique: direct laryngoscopy  Endotracheal tube insertion site: oral  Blade: Jovel  Blade size: 3  ETT size (mm): 7.5  Cormack-Lehane Classification: grade IIa - partial view of glottis  Placement verified by: chest auscultation and capnometry   Measured from: lips  ETT/EBT  to lips (cm): 23  Number of attempts at approach: 2  Assessment: lips, teeth, and gum same as pre-op and atraumatic intubation    Additional Comments  First view was with MAC 4, floppy epiglottis obstructs view of glottis, changed to use a Jovel 3 to lift epiglottis, tube passed easily through glottis, atraumatic.   ett cuff up at MOP

## 2022-08-19 NOTE — OP NOTE
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    Assistant: Nathalia Dillard CFA who was instrumental in helping with visualization of neural structures, hemostasis, and retraction of neural structures.  Her skilled assistance was necessary for the success of this case    Indications for the procedure:  This is a patient with severe pain in the legs.  Previous imaging had shown neural compression at the L4-5 levels.  As a result of this and the failure of conservative therapy the patient elected to proceed with surgery.    Operative summary:  After induction of general anesthesia the patient was intubated and placed on the operating table in the prone position on a Daniel table.  All pressure points were padded including peripheral points of entrapment.  The back was prepped with Chloraprep and then draped with Ioban, half sheets, and a split sheet.      The L4-5 level was localized with intraoperative fluoroscopy an incision was made on the left just above the pedicle.  Successive dilating tubes up to 18 mm by 5 cm were placed over that area.  Soft tissue was then removed from the supralaminar space.  The inferior laminar arch of L4 as well as the superior laminar arch of L5 and the medial aspect of facet were removed with the [employer] drill.  The remainder of the operation was done under high-power magnification of the operating microscope using microsurgical technique and microsurgical instrumentation.  The ligamentum flavum was opened and removed out to the level of the pedicle using the Kerrison rongeurs.  This exposed the lateral thecal sac and the nerve root of L5.  Once the lateral recess was opened the dissection was  carried up to the L4 pedicle completely decompressing the superior nerve root.  There was a lot of ligamentous hypertrophy and possibly even a synovial cyst adherent to the dura on that side which were carefully peeled off of the dura without evidence of CSF leak.    Once this was done the L5 nerve root was mobilized medially to expose the disc.  There was no evidence of a disc herniation and so the tube was angled more to the right in order to remove some ligamentous hypertrophy from the lateral recess on the right side as well.  At the conclusion of the procedure all IV nerve roots seem to be well decompressed.  Following this the bleeding was controlled with bipolar cautery.    Once the entire decompression was completed we were able to explore under the nerve root and the thecal sac using the Brothers ball probe to be sure there was no evidence of residual compression.  There being none bleeding was controlled again using the bipolar cautery, FloSeal, and thrombin and Gelfoam.  The area was then copiously irrigated with vancomycin solution and the tube was removed. The paraspinous musculature was injected with 100 cc 1/8% Marcaine with 1:200,000 epinephrine solution.    Another gram of Kefzol was given prior to closure.    The incision was then closed in layers and dressed and the patient was taken to the recovery room in stable condition there were no apparent complications.  Sponge, instrument, and needle counts were correct at the end of the procedure.

## 2022-08-19 NOTE — ANESTHESIA PREPROCEDURE EVALUATION
Anesthesia Evaluation     Patient summary reviewed and Nursing notes reviewed   NPO Solid Status: > 8 hours  NPO Liquid Status: > 4 hours           Airway   Mallampati: II  TM distance: >3 FB  Neck ROM: full  No difficulty expected  Dental - normal exam     Pulmonary - normal exam   (+) a smoker Former,   Cardiovascular - normal exam    ECG reviewed    (+) hypertension less than 2 medications, hyperlipidemia,       Neuro/Psych  (+) numbness,      ROS Comment: Hx poliomyelitis at age 15 but no sequelae  GI/Hepatic/Renal/Endo      Musculoskeletal     (+) back pain, chronic pain, radiculopathy Left lower extremity  Abdominal  - normal exam   Substance History      OB/GYN          Other   arthritis,    history of cancer      Other Comment: Hx rectal CA treated with chemo and radiation                Anesthesia Plan    ASA 2     general     intravenous induction     Anesthetic plan, risks, benefits, and alternatives have been provided, discussed and informed consent has been obtained with: patient.    Plan discussed with CRNA.        CODE STATUS:

## 2022-08-19 NOTE — DISCHARGE INSTRUCTIONS
LUMBAR SURGERY - JAVAD DIOP M.D.  [address], [address]  [place] [address]  [phone]    Instructions & Care After Your Lumbar Surgery    1. No sitting except on the commode.  You may lie on a firm couch but not on a waterbed or recliner.  You may lie in any position that is comfortable, using only one pillow under your head. Either stand at a counter or lie on your side for meals. Three weeks after surgery you may begin sitting for 30 minutes 3 times per day.    2. No driving for three weeks.  You may ride in the car in a passenger seat that reclines or lying down in the back seat.      3. No bending. If you drop something allow someone else to pick it up.    4. Don’t lift anything heavier than a coffee cup or paperback book.    5. Gradually increase your activity each day.  You should get out of bed every hour during the day.  Walk outside as soon as you feel up to it.  Walk short distances frequently rather than making a long trip.  Frequency is more important than distance.  Your goal is to be walking 2 to 3 miles per day when you return for your post-operative visit. (Never do this in one trip.)    6. You may climb stairs.    7. Remove your bandage the second day after surgery and leave it off.  If you notice any redness, swelling or drainage, call the office.  There are steri-strips across the incision.  If these are still present ten days after surgery, remove them gently.      8. You may shower five days after surgery.  Keep the incision dry until then.  Don’t let the water beat directly on the incision and gently pat it dry.    9. Physical Therapy will be arranged at your post-operative visit if needed.    10. Your prescription for pain medication may be filled for half the original amount prior to your return office visit.  Due to changes in Federal Law in order to have this medication refilled you must contact the office four days prior to the date and make arrangements to pick the  prescription up in the office.  This prescription refill cannot be called in to the pharmacy. Your prescription will be ready for pick-up the day the refill is due.    Don’t be alarmed if you experience some of your pre-operative symptoms after going home.  This is not uncommon and normally goes away in a few days but may last longer.  If you have any questions or concerns, please call our office.

## 2022-08-22 ENCOUNTER — TELEPHONE (OUTPATIENT)
Dept: NEUROSURGERY | Facility: CLINIC | Age: 87
End: 2022-08-22

## 2022-08-22 NOTE — TELEPHONE ENCOUNTER
How are you feeling? alright    Are you having any pain? Where? Incisional pain    Rate pain from 1-10 - 8, but not all the time. Only when moving    Are you taking the pain RX? yes    Do you think it's helping? yes    Do you feel better than before surgery? yes    Is you incision red, swollen or bleeding? None, no fever    Are you having any trouble with nausea or constipation? None    Were your discharge instructions easy to understand? yes    Any other questions?    Confirm post op appt - yes

## 2022-09-01 NOTE — PROGRESS NOTES
"Subjective   Patient ID: Gab Maloney is a 86 y.o. male is here today for follow-up of acute left-sided low back pain with left-sided sciatica. He is status post left lumbar 3 to lumbar 4 laminectomy with metrx on 08/19/2022 with Dr. Roper.     Today, Mr. Maloney reports that things are going well.  He has had complete resolution of preoperative left leg pain.  He does have some pain both to the right and left of the incision bilaterally along with some incisional soreness. He denies any numbness or tingling. He denies any leg weakness. He denies any bowel or bladder incontinence. He denies any redness, swelling or drainage around incision. He reports he is not taking anything for pain.     History of Present Illness    The following portions of the patient's history were reviewed and updated as appropriate: allergies, current medications, past medical history, past social history, past surgical history and problem list.    Review of Systems   Constitutional: Positive for activity change.   Musculoskeletal: Positive for back pain.   Neurological: Negative for weakness and numbness.   Psychiatric/Behavioral: Negative for sleep disturbance.         Objective     Vitals:    09/02/22 0817   BP: 118/80   Pulse: 79   Temp: 97.2 °F (36.2 °C)   SpO2: 97%   Weight: 74.8 kg (165 lb)   Height: 175.3 cm (69\")     Body mass index is 24.37 kg/m².      Physical Exam  Vitals reviewed.   Constitutional:       General: He is awake. He is not in acute distress.     Appearance: Normal appearance. He is well-developed. He is not ill-appearing, toxic-appearing or diaphoretic.   HENT:      Mouth/Throat:      Mouth: Mucous membranes are moist.   Eyes:      Extraocular Movements: Extraocular movements intact.   Pulmonary:      Effort: Pulmonary effort is normal.   Skin:     General: Skin is warm and dry.             Comments: Left lumbar spine incision is well approximated with Steri-Strips in place.  There is no evidence of " redness, swelling, or drainage.   Neurological:      General: No focal deficit present.      Mental Status: He is alert and oriented to person, place, and time. Mental status is at baseline.      GCS: GCS eye subscore is 4. GCS verbal subscore is 5. GCS motor subscore is 6.      Gait: Gait is intact.      Deep Tendon Reflexes: Strength normal.   Psychiatric:         Mood and Affect: Mood normal.         Speech: Speech normal.         Behavior: Behavior normal. Behavior is cooperative.         Thought Content: Thought content normal.         Judgment: Judgment normal.       Neurologic Exam     Mental Status   Oriented to person, place, and time.   Attention: normal. Concentration: normal.   Speech: speech is normal   Level of consciousness: alert  Knowledge: good.   Normal comprehension.     Cranial Nerves   Cranial nerves II through XII intact.     Motor Exam   Muscle bulk: normal  Overall muscle tone: normal    Strength   Strength 5/5 throughout.     Sensory Exam   Light touch normal.     Gait, Coordination, and Reflexes     Gait  Gait: normal          Assessment & Plan   Independent Review of Radiographic Studies:      I personally reviewed the images from the following studies:  No new imaging to review    Medical Decision Making:      Patient is here today for first postoperative follow-up appointment.  He is status post left lumbar 3 to lumbar 4 laminectomy with metrx on 08/19/2022 with Dr. Roper.  He reports that things are going well.  He has complete resolution of left leg pain.  He has some expected low back and incisional soreness, but is not taking anything for pain.  Verbal postoperative instructions given to both the patient and his wife.  Advised the patient to continue with 1 more week of sitting restrictions and at that time he can begin sitting for 20 to 30 minutes at a time 3 times a day and I also cleared the patient to drive at that time as well.  Will schedule next postoperative follow-up visit  for 4 weeks.  The patient will call in the meantime with any questions or concerns.  The patient verbalizes understanding and is in agreement with the above plan.       Diagnoses and all orders for this visit:    1. Status post surgery (Primary)      Return in about 4 weeks (around 9/30/2022) for With APC.

## 2022-09-02 ENCOUNTER — OFFICE VISIT (OUTPATIENT)
Dept: NEUROSURGERY | Facility: CLINIC | Age: 87
End: 2022-09-02

## 2022-09-02 ENCOUNTER — TELEPHONE (OUTPATIENT)
Dept: NEUROSURGERY | Facility: CLINIC | Age: 87
End: 2022-09-02

## 2022-09-02 VITALS
HEART RATE: 79 BPM | TEMPERATURE: 97.2 F | SYSTOLIC BLOOD PRESSURE: 118 MMHG | HEIGHT: 69 IN | DIASTOLIC BLOOD PRESSURE: 80 MMHG | WEIGHT: 165 LBS | OXYGEN SATURATION: 97 % | BODY MASS INDEX: 24.44 KG/M2

## 2022-09-02 DIAGNOSIS — Z98.890 STATUS POST SURGERY: Primary | ICD-10-CM

## 2022-09-02 PROCEDURE — 99024 POSTOP FOLLOW-UP VISIT: CPT | Performed by: NURSE PRACTITIONER

## 2022-09-02 RX ORDER — DEXAMETHASONE 4 MG/1
TABLET ORAL
COMMUNITY
Start: 2022-08-31 | End: 2023-01-05

## 2022-09-14 NOTE — TELEPHONE ENCOUNTER
I called and spoke with Mr. Maloney and scheduled an appointment. He asked me about bending and weight limits. I advised him they will discuss that at his next post op appointment with limits regarding weights. He voiced understanding.

## 2022-09-28 NOTE — PROGRESS NOTES
"Subjective   Patient ID: Gab Maloney is a 86 y.o. male is here today for 4 week follow-up of acute left-sided low back pain with left-sided sciatica. He is status post left lumbar 3 to lumbar 4 laminectomy with metrx on 08/19/2022 with Dr. Roper.       History of Present Illness     Today Mr. Maloney reports he is having some slight right sided back pain that started after the surgery. He denies any leg pain, numbness or tingling. He denies any bowel or bladder incontinence. He is no longer taking anything for pain.       Review of Systems   Constitutional: Positive for activity change.   Musculoskeletal: Positive for back pain.   Neurological: Negative for weakness and numbness.   Psychiatric/Behavioral: Negative for sleep disturbance.     Objective     Vitals:    09/30/22 0840   BP: 120/72   Pulse: 76   Temp: 97.5 °F (36.4 °C)   SpO2: 99%   Weight: 74.8 kg (165 lb)   Height: 175.3 cm (69\")     Body mass index is 24.37 kg/m².      Physical Exam  Constitutional:       General: He is not in acute distress.     Appearance: He is well-developed.   Skin:     General: Skin is warm and dry.      Comments: Lumbar incision is well approximated. No redness, swelling or drainage.    Neurological:      Comments: No motor or sensory deficits on exam. Negative SLR bilaterally.        Assessment & Plan     Medical Decision Making:      Mr. Maloney returns the office today for 6-week postop evaluation following left L3-4 laminectomy by Dr. Roper.  He is feeling much better.  He denies any leg pain although he does have some intermittent manageable right-sided low back pain.  He states that he is very pleased with his progress.  He will resume his home physical therapy exercises and continue walking.  He was offered a follow-up visit with Dr. Roper which he declined. If his symptoms begin to worsen in any way he will call the office.      Diagnoses and all orders for this visit:    1. Status post surgery " (Primary)      Return if symptoms worsen or fail to improve.

## 2022-09-30 ENCOUNTER — OFFICE VISIT (OUTPATIENT)
Dept: NEUROSURGERY | Facility: CLINIC | Age: 87
End: 2022-09-30

## 2022-09-30 VITALS
HEIGHT: 69 IN | BODY MASS INDEX: 24.44 KG/M2 | HEART RATE: 76 BPM | DIASTOLIC BLOOD PRESSURE: 72 MMHG | SYSTOLIC BLOOD PRESSURE: 120 MMHG | OXYGEN SATURATION: 99 % | WEIGHT: 165 LBS | TEMPERATURE: 97.5 F

## 2022-09-30 DIAGNOSIS — Z98.890 STATUS POST SURGERY: Primary | ICD-10-CM

## 2022-09-30 PROCEDURE — 99024 POSTOP FOLLOW-UP VISIT: CPT | Performed by: NURSE PRACTITIONER

## 2022-11-07 NOTE — PROGRESS NOTES
Chief Complaint  Back Pain    Subjective          Gab presents to [facility]    86 year old male presented to the clinic to discuss ongoing low back pain. / left hip/leg pain.  HE was seen in the office for his annual physical 12/21 and started on a steroid pack and flexeril to see if he would have any benefit.  He denies any help with pain.  States the pain is sharp and shooting down his left leg.  It is worse when he sits in the same position to long or with bending.  He takes daily Tylenol at maximum 3000 mg dose.  HE is allergic to aspirin and NSAIDS.  He has not tried any other medications.    He has had xray and MRI and Is waiting on referral to Neurosurgery and Pain management         He has an active problem list of the following      Patient Active Problem List   Diagnosis   • Other hyperlipidemia   • Essential hypertension   • Elevated TSH   • Nocturia   • History of colon cancer   • Acute left-sided low back pain with left-sided sciatica   • History of poliomyelitis      He has been compliant with the following medications         Current Outpatient Medications on File Prior to Visit   Medication Sig Dispense Refill   • atorvastatin (LIPITOR) 10 MG tablet Take 1 tablet by mouth Every Night for 180 days. 90 tablet 1   • lisinopril (PRINIVIL,ZESTRIL) 20 MG tablet Take 1 tablet by mouth Daily for 180 days. 90 tablet 1   • Vitamin D, Cholecalciferol, 1000 UNITS capsule Take  by mouth.          No current facility-administered medications on file prior to visit.      He denies any medication side effects     The following portions of the patient's history were reviewed and updated as appropriate: allergies, current medications, past family history, past medical history, past social history, past surgical history, and problem list     Review of Systems   Eyes: Negative for visual disturbance.   Musculoskeletal: Positive for arthralgias, back pain and myalgias.   Neurological:  No new care gaps identified.  [facility] Embedded Care Gaps. Reference number: [id number]. 11/06/2022   7:39:02 PM CST   "Negative for dizziness and light-headedness.        Objective   Vital Signs:   Vitals:    03/14/22 1453   BP: 134/78   Pulse: 70   Temp: 97.7 °F (36.5 °C)   SpO2: 98%   Weight: 78.5 kg (173 lb)   Height: 172.7 cm (67.99\")        Physical Exam  Vitals reviewed.   Constitutional:       Appearance: Normal appearance. He is not ill-appearing.   HENT:      Head: Normocephalic.   Neck:      Vascular: No carotid bruit.   Cardiovascular:      Rate and Rhythm: Normal rate and regular rhythm.      Pulses: Normal pulses.      Heart sounds: Normal heart sounds. No murmur heard.  Pulmonary:      Effort: Pulmonary effort is normal.      Breath sounds: Normal breath sounds.   Musculoskeletal:      Cervical back: Normal range of motion and neck supple.      Lumbar back: Spasms present. No tenderness or bony tenderness. Decreased range of motion. Positive left straight leg raise test.        Back:    Skin:     General: Skin is warm.   Neurological:      Mental Status: He is alert and oriented to person, place, and time.   Psychiatric:         Mood and Affect: Mood normal.         Behavior: Behavior normal.         Thought Content: Thought content normal.         Judgment: Judgment normal.          Result Review :     The following data was reviewed by: EMILY Rhodes on 03/14/2022:  XR Spine Lumbar 4+ View (In Office) (01/12/2022 13:17)      MRI Lumbar Spine Without Contrast (02/28/2022 06:52)       Assessment and Plan    Diagnoses and all orders for this visit:    1. Chronic bilateral low back pain with left-sided sciatica (Primary)  -     DULoxetine (CYMBALTA) 30 MG capsule; Take 1 capsule by mouth Every Night for 60 days.  Dispense: 30 capsule; Refill: 1    2. DDD (degenerative disc disease), lumbar  -     DULoxetine (CYMBALTA) 30 MG capsule; Take 1 capsule by mouth Every Night for 60 days.  Dispense: 30 capsule; Refill: 1    Other orders  -     Discontinue: DULoxetine (CYMBALTA) 30 MG capsule; Take 1 capsule by mouth " Every Night for 60 days.  Dispense: 30 capsule; Refill: 1    Reviewed referrals to pain management and Neurosurgery-  Calling offices to get first available appointments.      Follow Up   Return if symptoms worsen or fail to improve.  Patient was given instructions and counseling regarding his condition or for health maintenance advice. Please see specific information pulled into the AVS if appropriate.

## 2023-01-05 ENCOUNTER — OFFICE VISIT (OUTPATIENT)
Dept: FAMILY MEDICINE CLINIC | Facility: CLINIC | Age: 88
End: 2023-01-05
Payer: MEDICARE

## 2023-01-05 VITALS
BODY MASS INDEX: 25.33 KG/M2 | SYSTOLIC BLOOD PRESSURE: 132 MMHG | DIASTOLIC BLOOD PRESSURE: 56 MMHG | TEMPERATURE: 98.3 F | HEIGHT: 69 IN | OXYGEN SATURATION: 98 % | RESPIRATION RATE: 18 BRPM | WEIGHT: 171 LBS | HEART RATE: 54 BPM

## 2023-01-05 DIAGNOSIS — I10 ESSENTIAL HYPERTENSION: ICD-10-CM

## 2023-01-05 DIAGNOSIS — Z00.00 ENCOUNTER FOR WELLNESS EXAMINATION IN ADULT: Primary | ICD-10-CM

## 2023-01-05 DIAGNOSIS — Z00.00 MEDICARE ANNUAL WELLNESS VISIT, SUBSEQUENT: ICD-10-CM

## 2023-01-05 DIAGNOSIS — E78.49 OTHER HYPERLIPIDEMIA: ICD-10-CM

## 2023-01-05 PROCEDURE — 99397 PER PM REEVAL EST PAT 65+ YR: CPT | Performed by: FAMILY MEDICINE

## 2023-01-05 PROCEDURE — 1170F FXNL STATUS ASSESSED: CPT | Performed by: FAMILY MEDICINE

## 2023-01-05 PROCEDURE — G0439 PPPS, SUBSEQ VISIT: HCPCS | Performed by: FAMILY MEDICINE

## 2023-01-05 PROCEDURE — 1160F RVW MEDS BY RX/DR IN RCRD: CPT | Performed by: FAMILY MEDICINE

## 2023-01-05 RX ORDER — LISINOPRIL 20 MG/1
20 TABLET ORAL NIGHTLY
Qty: 90 TABLET | Refills: 2 | Status: SHIPPED | OUTPATIENT
Start: 2023-01-05 | End: 2023-10-02

## 2023-01-05 RX ORDER — ATORVASTATIN CALCIUM 10 MG/1
10 TABLET, FILM COATED ORAL NIGHTLY
Qty: 90 TABLET | Refills: 2 | Status: SHIPPED | OUTPATIENT
Start: 2023-01-05 | End: 2023-10-02

## 2023-01-05 NOTE — PATIENT INSTRUCTIONS
Exercising to Stay Healthy  To become healthy and stay healthy, it is recommended that you do moderate-intensity and vigorous-intensity exercise. You can tell that you are exercising at a moderate intensity if your heart starts beating faster and you start breathing faster but can still hold a conversation. You can tell that you are exercising at a vigorous intensity if you are breathing much harder and faster and cannot hold a conversation while exercising.  How can exercise benefit me?  Exercising regularly is important. It has many health benefits, such as:  Improving overall fitness, flexibility, and endurance.  Increasing bone density.  Helping with weight control.  Decreasing body fat.  Increasing muscle strength and endurance.  Reducing stress and tension, anxiety, depression, or anger.  Improving overall health.  What guidelines should I follow while exercising?  Before you start a new exercise program, talk with your health care provider.  Do not exercise so much that you hurt yourself, feel dizzy, or get very short of breath.  Wear comfortable clothes and wear shoes with good support.  Drink plenty of water while you exercise to prevent dehydration or heat stroke.  Work out until your breathing and your heartbeat get faster (moderate intensity).  How often should I exercise?  Choose an activity that you enjoy, and set realistic goals. Your health care provider can help you make an activity plan that is individually designed and works best for you.  Exercise regularly as told by your health care provider. This may include:  Doing strength training two times a week, such as:  Lifting weights.  Using resistance bands.  Push-ups.  Sit-ups.  Yoga.  Doing a certain intensity of exercise for a given amount of time. Choose from these options:  A total of 150 minutes of moderate-intensity exercise every week.  A total of 75 minutes of vigorous-intensity exercise every week.  A mix of moderate-intensity and  vigorous-intensity exercise every week.  Children, pregnant women, people who have not exercised regularly, people who are overweight, and older adults may need to talk with a health care provider about what activities are safe to perform. If you have a medical condition, be sure to talk with your health care provider before you start a new exercise program.  What are some exercise ideas?  Moderate-intensity exercise ideas include:  Walking 1 mile (1.6 km) in about 15 minutes.  Biking.  Hiking.  Golfing.  Dancing.  Water aerobics.  Vigorous-intensity exercise ideas include:  Walking 4.5 miles (7.2 km) or more in about 1 hour.  Jogging or running 5 miles (8 km) in about 1 hour.  Biking 10 miles (16.1 km) or more in about 1 hour.  Lap swimming.  Roller-skating or in-line skating.  Cross-country skiing.  Vigorous competitive sports, such as football, basketball, and soccer.  Jumping rope.  Aerobic dancing.  What are some everyday activities that can help me get exercise?  Yard work, such as:  Pushing a .  Raking and bagging leaves.  Washing your car.  Pushing a stroller.  Shoveling snow.  Gardening.  Washing windows or floors.  How can I be more active in my day-to-day activities?  Use stairs instead of an elevator.  Take a walk during your lunch break.  If you drive, park your car farther away from your work or school.  If you take public transportation, get off one stop early and walk the rest of the way.  Stand up or walk around during all of your indoor phone calls.  Get up, stretch, and walk around every 30 minutes throughout the day.  Enjoy exercise with a friend. Support to continue exercising will help you keep a regular routine of activity.  Where to find more information  You can find more information about exercising to stay healthy from:  [place] Department of Health and Human Services: www.hhs.gov  Centers for Disease Control and Prevention (CDC): www.cdc.gov  Summary  Exercising regularly is  important. It will improve your overall fitness, flexibility, and endurance.  Regular exercise will also improve your overall health. It can help you control your weight, reduce stress, and improve your bone density.  Do not exercise so much that you hurt yourself, feel dizzy, or get very short of breath.  Before you start a new exercise program, talk with your health care provider.  This information is not intended to replace advice given to you by your health care provider. Make sure you discuss any questions you have with your health care provider.  Document Revised: 04/15/2022 Document Reviewed: 04/15/2022  Elsevier Patient Education ©  [employer]      Medicare Wellness  Personal Prevention Plan of Service     Date of Office Visit:    Encounter Provider:  Franck Moreno MD  Place of Service:  [facility]  Patient Name: Gab Maloney  :  1935    As part of the Medicare Wellness portion of your visit today, we are providing you with this personalized preventive plan of services (PPPS). This plan is based upon recommendations of the [place] Preventive Services Task Force (USPSTF) and the Advisory Committee on Immunization Practices (ACIP).    This lists the preventive care services that should be considered, and provides dates of when you are due. Items listed as completed are up-to-date and do not require any further intervention.    Health Maintenance   Topic Date Due    LIPID PANEL  2023    ANNUAL WELLNESS VISIT  2024    TDAP/TD VACCINES (2 - Td or Tdap) 2032    COVID-19 Vaccine  Completed    INFLUENZA VACCINE  Completed    Pneumococcal Vaccine 65+  Completed    ZOSTER VACCINE  Completed       No orders of the defined types were placed in this encounter.      Return in about 6 months (around 2023) for recheck/refill medication.

## 2023-01-05 NOTE — PROGRESS NOTES
The ABCs of the Annual Wellness Visit  Subsequent Medicare Wellness Visit    Subjective    Gab Maloney is a 87 y.o. male who presents for a Subsequent Medicare Wellness Visit.    The following portions of the patient's history were reviewed and   updated as appropriate: allergies, current medications, past family history, past medical history, past social history, past surgical history and problem list.    Compared to one year ago, the patient feels his physical   health is the same.    Compared to one year ago, the patient feels his mental   health is the same.    Recent Hospitalizations:  He was not admitted to the hospital during the last year.       Current Medical Providers:  Patient Care Team:  Franck Moreno MD as PCP - General  Franck Moreno MD as PCP - Family Medicine    Outpatient Medications Prior to Visit   Medication Sig Dispense Refill   • Vitamin D, Cholecalciferol, 1000 UNITS capsule Take  by mouth.     • acetaminophen (TYLENOL) 500 MG tablet Take 500 mg by mouth Every 6 (Six) Hours As Needed for Mild Pain .     • atorvastatin (LIPITOR) 10 MG tablet Take 1 tablet by mouth Every Night for 270 days. 90 tablet 2   • dexamethasone (DECADRON) 4 MG tablet      • DULoxetine (CYMBALTA) 30 MG capsule Take 1 capsule by mouth Every Night for 270 days. 90 capsule 2   • HYDROcodone-acetaminophen (NORCO) 5-325 MG per tablet Take 1 tablet by mouth Every 4 (Four) Hours As Needed for Moderate Pain  (pain). 35 tablet 0   • lisinopril (PRINIVIL,ZESTRIL) 20 MG tablet Take 1 tablet by mouth Daily for 270 days. (Patient taking differently: Take 20 mg by mouth Every Night.) 90 tablet 2     No facility-administered medications prior to visit.       No opioid medication identified on active medication list. I have reviewed chart for other potential  high risk medication/s and harmful drug interactions in the elderly.          Aspirin is not on active medication list.  Aspirin use is not indicated based on review of  current medical condition/s. Risk of harm outweighs potential benefits.  .    Patient Active Problem List   Diagnosis   • Other hyperlipidemia   • Essential hypertension   • Elevated TSH   • Nocturia   • History of colon cancer   • Acute left-sided low back pain with left-sided sciatica   • History of poliomyelitis   • Chronic pain disorder   • Lumbar spondylosis   • Status post surgery     Advance Care Planning  Advance Directive is on file.  ACP discussion was held with the patient during this visit. Patient has an advance directive in EMR which is still valid.      Objective    Vitals:    23 0912   BP: 132/56   Pulse: 54   Resp: 18   Temp: 98.3 °F (36.8 °C)   SpO2: 98%   Weight: 77.6 kg (171 lb)   Height: 175.3 cm (69\")     Estimated body mass index is 25.25 kg/m² as calculated from the following:    Height as of this encounter: 175.3 cm (69\").    Weight as of this encounter: 77.6 kg (171 lb).    BMI is >= 25 and <30. (Overweight) The following options were offered after discussion;: exercise counseling/recommendations and nutrition counseling/recommendations      Does the patient have evidence of cognitive impairment? No          HEALTH RISK ASSESSMENT    Smoking Status:  Social History     Tobacco Use   Smoking Status Former   • Years: 20.00   • Types: Cigarettes   • Quit date:    • Years since quittin.0   Smokeless Tobacco Never   Tobacco Comments    quit age 35     Alcohol Consumption:  Social History     Substance and Sexual Activity   Alcohol Use Yes    Comment: RARELY     Fall Risk Screen:    STEADI Fall Risk Assessment was completed, and patient is at LOW risk for falls.Assessment completed on:2023    Depression Screening:  PHQ-2/PHQ-9 Depression Screening 2023   Little Interest or Pleasure in Doing Things 0-->not at all   Feeling Down, Depressed or Hopeless 0-->not at all   PHQ-9: Brief Depression Severity Measure Score 0       Health Habits and Functional and Cognitive  Screening:  Functional & Cognitive Status 1/5/2023   Do you have difficulty preparing food and eating? No   Do you have difficulty bathing yourself, getting dressed or grooming yourself? No   Do you have difficulty using the toilet? No   Do you have difficulty moving around from place to place? No   Do you have trouble with steps or getting out of a bed or a chair? No   Current Diet Well Balanced Diet   Dental Exam Up to date   Eye Exam Up to date   Exercise (times per week) 7 times per week   Current Exercises Include Walking   Current Exercise Activities Include -   Do you need help using the phone?  No   Are you deaf or do you have serious difficulty hearing?  No   Do you need help with transportation? No   Do you need help shopping? No   Do you need help preparing meals?  No   Do you need help with housework?  No   Do you need help with laundry? No   Do you need help taking your medications? No   Do you need help managing money? No   Do you ever drive or ride in a car without wearing a seat belt? No   Have you felt unusual stress, anger or loneliness in the last month? No   Who do you live with? Spouse   If you need help, do you have trouble finding someone available to you? No   Have you been bothered in the last four weeks by sexual problems? -   Do you have difficulty concentrating, remembering or making decisions? No       Age-appropriate Screening Schedule:  Refer to the list below for future screening recommendations based on patient's age, sex and/or medical conditions. Orders for these recommended tests are listed in the plan section. The patient has been provided with a written plan.    Health Maintenance   Topic Date Due   • LIPID PANEL  06/16/2023   • TDAP/TD VACCINES (2 - Td or Tdap) 06/07/2032   • INFLUENZA VACCINE  Completed   • ZOSTER VACCINE  Completed                CMS Preventative Services Quick Reference  Risk Factors Identified During Encounter  None Identified  The above risks/problems have  been discussed with the patient.  Pertinent information has been shared with the patient in the After Visit Summary.  An After Visit Summary and PPPS were made available to the patient.    Follow Up:   Next Medicare Wellness visit to be scheduled in 1 year.       Additional E&M Note during same encounter follows:  Patient has multiple medical problems which are significant and separately identifiable that require additional work above and beyond the Medicare Wellness Visit.      Chief Complaint  Medicare Wellness-subsequent and Hypertension (Med refills )    Subjective        HPI  Gab Maloney is also being seen today for an annual adult preventative physical exam. Overall he feels well. Problem list, medication list, and PMFSH have been reviewed. All recent labs(if applicable) and prescription refills(if needed) have been addressed during today's office visit.  The following were discussed during today's preventative wellness exam: Nutrition, Physical activity, Healthy weight, Immunizations and Screenings     Review of Systems   Constitutional: Negative for fatigue.   HENT: Negative.    Eyes: Negative.    Respiratory: Negative for cough and shortness of breath.    Cardiovascular: Negative for chest pain and palpitations.   Gastrointestinal: Negative.    Genitourinary: Negative for difficulty urinating.   Musculoskeletal: Negative for back pain.   Skin: Negative.    Neurological: Negative for dizziness.   Hematological: Does not bruise/bleed easily.   Psychiatric/Behavioral: Negative for dysphoric mood. The patient is not nervous/anxious.    All other systems reviewed and are negative.      Objective   Vital Signs:  /56   Pulse 54   Temp 98.3 °F (36.8 °C)   Resp 18   Ht 175.3 cm (69\")   Wt 77.6 kg (171 lb)   SpO2 98%   BMI 25.25 kg/m²     Physical Exam  Vitals reviewed.   Constitutional:       General: He is not in acute distress.  Eyes:      General: Lids are normal.      Conjunctiva/sclera:  Conjunctivae normal.   Neck:      Vascular: No carotid bruit.      Trachea: No tracheal deviation.   Cardiovascular:      Rate and Rhythm: Normal rate and regular rhythm.      Heart sounds: Normal heart sounds. No murmur heard.  Pulmonary:      Effort: Pulmonary effort is normal.      Breath sounds: Normal breath sounds.   Skin:     General: Skin is warm and dry.   Neurological:      Mental Status: He is alert. He is not disoriented.   Psychiatric:         Speech: Speech normal.         Behavior: Behavior normal. Behavior is cooperative.                         Assessment and Plan   Diagnoses and all orders for this visit:    1. Encounter for wellness examination in adult (Primary)  Comments:  Normal exam.  Continue with healthy diet and exercise recommended.    2. Essential hypertension  Assessment & Plan:  Condition is stable. The current medical regimen is effective;  continue present plan and medications.    Orders:  -     lisinopril (PRINIVIL,ZESTRIL) 20 MG tablet; Take 1 tablet by mouth Every Night for 270 days.  Dispense: 90 tablet; Refill: 2    3. Other hyperlipidemia  Assessment & Plan:  Condition is stable. The current medical regimen is effective;  continue present plan and medications.    Orders:  -     atorvastatin (LIPITOR) 10 MG tablet; Take 1 tablet by mouth Every Night for 270 days.  Dispense: 90 tablet; Refill: 2    4. Medicare annual wellness visit, subsequent           Follow Up   Return in about 6 months (around 7/5/2023) for recheck/refill medication.  Patient was given instructions and counseling regarding his condition or for health maintenance advice. Please see specific information pulled into the AVS if appropriate.

## 2023-01-05 NOTE — ASSESSMENT & PLAN NOTE
Condition is stable. The current medical regimen is effective;  continue present plan and medications.

## 2023-10-05 ENCOUNTER — TELEPHONE (OUTPATIENT)
Dept: FAMILY MEDICINE CLINIC | Facility: CLINIC | Age: 88
End: 2023-10-05

## 2023-10-05 ENCOUNTER — OFFICE VISIT (OUTPATIENT)
Dept: FAMILY MEDICINE CLINIC | Facility: CLINIC | Age: 88
End: 2023-10-05
Payer: MEDICARE

## 2023-10-05 VITALS
BODY MASS INDEX: 25.91 KG/M2 | OXYGEN SATURATION: 96 % | SYSTOLIC BLOOD PRESSURE: 120 MMHG | HEART RATE: 56 BPM | DIASTOLIC BLOOD PRESSURE: 72 MMHG | TEMPERATURE: 97 F | RESPIRATION RATE: 16 BRPM | HEIGHT: 68 IN | WEIGHT: 171 LBS

## 2023-10-05 DIAGNOSIS — L98.9 SKIN LESION OF FACE: ICD-10-CM

## 2023-10-05 DIAGNOSIS — Z23 IMMUNIZATION DUE: ICD-10-CM

## 2023-10-05 DIAGNOSIS — I10 ESSENTIAL HYPERTENSION: Primary | ICD-10-CM

## 2023-10-05 DIAGNOSIS — R35.1 NOCTURIA: ICD-10-CM

## 2023-10-05 DIAGNOSIS — R79.89 ELEVATED TSH: ICD-10-CM

## 2023-10-05 DIAGNOSIS — E78.49 OTHER HYPERLIPIDEMIA: ICD-10-CM

## 2023-10-05 PROCEDURE — 90662 IIV NO PRSV INCREASED AG IM: CPT | Performed by: FAMILY MEDICINE

## 2023-10-05 PROCEDURE — G0008 ADMIN INFLUENZA VIRUS VAC: HCPCS | Performed by: FAMILY MEDICINE

## 2023-10-05 PROCEDURE — 99214 OFFICE O/P EST MOD 30 MIN: CPT | Performed by: FAMILY MEDICINE

## 2023-10-05 RX ORDER — LISINOPRIL 10 MG/1
10 TABLET ORAL NIGHTLY
Qty: 90 TABLET | Refills: 2 | Status: SHIPPED | OUTPATIENT
Start: 2023-10-05 | End: 2024-07-01

## 2023-10-05 RX ORDER — ATORVASTATIN CALCIUM 10 MG/1
10 TABLET, FILM COATED ORAL NIGHTLY
Qty: 90 TABLET | Refills: 2 | Status: SHIPPED | OUTPATIENT
Start: 2023-10-05 | End: 2024-07-01

## 2023-10-05 NOTE — PROGRESS NOTES
"Chief Complaint  Hypertension and Follow-up (3 month follow up )    Subjective        Hypertension     Gab presents to [facility] for medication refills. Overall he feels well. Good medication compliance is reported. No medication side effects are reported.            Objective   Vital Signs:   Vitals:    10/05/23 1353   BP: 120/72   BP Location: Left arm   Patient Position: Sitting   Pulse: 56   Resp: 16   Temp: 97 °F (36.1 °C)   TempSrc: Oral   SpO2: 96%   Weight: 77.6 kg (171 lb)   Height: 172.7 cm (68\")                  Physical Exam  Vitals reviewed.   Constitutional:       General: He is not in acute distress.  Eyes:      General: Lids are normal.      Conjunctiva/sclera: Conjunctivae normal.      Comments: Left infraorbital skin lesion consistent with basal cell carcinoma   Neck:      Vascular: No carotid bruit.      Trachea: No tracheal deviation.   Cardiovascular:      Rate and Rhythm: Normal rate and regular rhythm.      Heart sounds: Normal heart sounds. No murmur heard.  Pulmonary:      Effort: Pulmonary effort is normal.      Breath sounds: Normal breath sounds.   Skin:     General: Skin is warm and dry.   Neurological:      Mental Status: He is alert. He is not disoriented.   Psychiatric:         Speech: Speech normal.         Behavior: Behavior normal. Behavior is cooperative.        Result Review :     The following data was reviewed by: Franck Moreno MD on 10/05/2023:  CK (06/28/2023 08:41)  TSH (06/28/2023 08:41)  Comprehensive Metabolic Panel (06/28/2023 08:41)  CBC & Differential (06/28/2023 08:41)  Lipid Panel With LDL/HDL Ratio (06/28/2023 08:41)         Assessment and Plan    Diagnoses and all orders for this visit:    1. Essential hypertension (Primary)  Assessment & Plan:  Condition is stable. The current medical regimen is effective;  continue present plan and medications.    Orders:  -     Comprehensive Metabolic Panel; Future  -     CBC & Differential; " Future  -     TSH; Future  -     lisinopril (PRINIVIL,ZESTRIL) 10 MG tablet; Take 1 tablet by mouth Every Night for 270 days.  Dispense: 90 tablet; Refill: 2    2. Other hyperlipidemia  Assessment & Plan:  Condition is stable. The current medical regimen is effective;  continue present plan and medications.    Orders:  -     Lipid Panel; Future  -     CK; Future  -     atorvastatin (LIPITOR) 10 MG tablet; Take 1 tablet by mouth Every Night for 270 days.  Dispense: 90 tablet; Refill: 2    3. Nocturia  Assessment & Plan:  Nocturia symptoms are stable. PSA will be monitored with annual labs.       Orders:  -     PSA DIAGNOSTIC; Future    4. Elevated TSH  Assessment & Plan:  Check labs in 6 months.      5. Skin lesion of face  -     Ambulatory Referral to Dermatology    6. Immunization due  -     Fluzone High-Dose 65+yrs (2426-0829)        Follow Up   Return in about 6 months (around 4/5/2024) for Medicare Wellness & regular visit, [id number] min.  Patient was given instructions and counseling regarding his condition or for health maintenance advice. Please see specific information pulled into the AVS if appropriate.

## 2023-10-05 NOTE — TELEPHONE ENCOUNTER
Caller: April Maloney    Relationship: Emergency Contact    Best call back number: [phone]      What specialty or service is being requested: DERMATOLOGIST    What is the provider, practice or medical service name: DR. ALISON SCOTT    What is the office phone number: [phone]

## 2023-11-02 ENCOUNTER — PREP FOR SURGERY (OUTPATIENT)
Dept: OTHER | Facility: HOSPITAL | Age: 88
End: 2023-11-02
Payer: MEDICARE

## 2023-11-02 DIAGNOSIS — Z85.048 PERSONAL HISTORY OF RECTAL CANCER: Primary | ICD-10-CM

## 2023-11-02 DIAGNOSIS — Z86.010 PERSONAL HISTORY OF COLONIC POLYPS: ICD-10-CM

## 2024-02-06 ENCOUNTER — TELEPHONE (OUTPATIENT)
Dept: SURGERY | Facility: CLINIC | Age: 89
End: 2024-02-06
Payer: MEDICARE

## 2024-02-06 NOTE — TELEPHONE ENCOUNTER
Called patient to schedule a colonoscopy (5 year recall), however, patient said he did not want to have another colonoscopy.  He said he is not having any problems and he is 88 and does not want to have it done.  If he changes his mind he will contact our office.

## 2024-04-15 ENCOUNTER — OFFICE VISIT (OUTPATIENT)
Dept: FAMILY MEDICINE CLINIC | Facility: CLINIC | Age: 89
End: 2024-04-15
Payer: MEDICARE

## 2024-04-15 VITALS
HEIGHT: 69 IN | WEIGHT: 171 LBS | BODY MASS INDEX: 25.33 KG/M2 | HEART RATE: 50 BPM | DIASTOLIC BLOOD PRESSURE: 70 MMHG | OXYGEN SATURATION: 98 % | SYSTOLIC BLOOD PRESSURE: 108 MMHG

## 2024-04-15 DIAGNOSIS — Z00.00 ENCOUNTER FOR WELLNESS EXAMINATION IN ADULT: ICD-10-CM

## 2024-04-15 DIAGNOSIS — E78.49 OTHER HYPERLIPIDEMIA: ICD-10-CM

## 2024-04-15 DIAGNOSIS — I10 ESSENTIAL HYPERTENSION: ICD-10-CM

## 2024-04-15 DIAGNOSIS — R73.09 ELEVATED GLUCOSE LEVEL: ICD-10-CM

## 2024-04-15 DIAGNOSIS — D64.9 ANEMIA OF UNKNOWN ETIOLOGY: ICD-10-CM

## 2024-04-15 DIAGNOSIS — Z00.00 MEDICARE ANNUAL WELLNESS VISIT, SUBSEQUENT: Primary | ICD-10-CM

## 2024-04-15 DIAGNOSIS — R79.89 ELEVATED SERUM CREATININE: ICD-10-CM

## 2024-04-15 PROCEDURE — G0439 PPPS, SUBSEQ VISIT: HCPCS | Performed by: FAMILY MEDICINE

## 2024-04-15 PROCEDURE — 99397 PER PM REEVAL EST PAT 65+ YR: CPT | Performed by: FAMILY MEDICINE

## 2024-04-15 PROCEDURE — 1160F RVW MEDS BY RX/DR IN RCRD: CPT | Performed by: FAMILY MEDICINE

## 2024-04-15 PROCEDURE — 99214 OFFICE O/P EST MOD 30 MIN: CPT | Performed by: FAMILY MEDICINE

## 2024-04-15 PROCEDURE — 1170F FXNL STATUS ASSESSED: CPT | Performed by: FAMILY MEDICINE

## 2024-04-15 RX ORDER — ATORVASTATIN CALCIUM 10 MG/1
10 TABLET, FILM COATED ORAL NIGHTLY
Qty: 90 TABLET | Refills: 2 | Status: SHIPPED | OUTPATIENT
Start: 2024-04-15 | End: 2025-01-10

## 2024-04-15 RX ORDER — LISINOPRIL 10 MG/1
10 TABLET ORAL NIGHTLY
Qty: 90 TABLET | Refills: 2 | Status: SHIPPED | OUTPATIENT
Start: 2024-04-15 | End: 2025-01-10

## 2024-04-15 NOTE — PROGRESS NOTES
The ABCs of the Annual Wellness Visit  Subsequent Medicare Wellness Visit    Subjective    Gab Maloney is a 88 y.o. male who presents for a Subsequent Medicare Wellness Visit.    The following portions of the patient's history were reviewed and   updated as appropriate: allergies, current medications, past family history, past medical history, past social history, past surgical history, and problem list.    Compared to one year ago, the patient feels his physical   health is the same.    Compared to one year ago, the patient feels his mental   health is the same.    Recent Hospitalizations:  He was not admitted to the hospital during the last year.       Current Medical Providers:  Patient Care Team:  Franck Moreno MD as PCP - General  Franck Moreno MD as PCP - Family Medicine    Outpatient Medications Prior to Visit   Medication Sig Dispense Refill    atorvastatin (LIPITOR) 10 MG tablet Take 1 tablet by mouth Every Night for 270 days. 90 tablet 2    lisinopril (PRINIVIL,ZESTRIL) 10 MG tablet Take 1 tablet by mouth Every Night for 270 days. 90 tablet 2     No facility-administered medications prior to visit.       No opioid medication identified on active medication list. I have reviewed chart for other potential  high risk medication/s and harmful drug interactions in the elderly.        Aspirin is not on active medication list.  Aspirin use is not indicated based on review of current medical condition/s. Risk of harm outweighs potential benefits.  .    Patient Active Problem List   Diagnosis    Other hyperlipidemia    Essential hypertension    Elevated TSH    Nocturia    History of colon cancer    Acute left-sided low back pain with left-sided sciatica    History of poliomyelitis    Chronic pain disorder    Lumbar spondylosis    Status post surgery     Advance Care Planning   Advance Care Planning     Advance Directive is on file.  ACP discussion was held with the patient during this visit. Patient has an  "advance directive in EMR which is still valid.      Objective    Vitals:    04/15/24 0820   BP: 108/70   Pulse: 50   SpO2: 98%   Weight: 77.6 kg (171 lb)   Height: 175.3 cm (69\")     Estimated body mass index is 25.25 kg/m² as calculated from the following:    Height as of this encounter: 175.3 cm (69\").    Weight as of this encounter: 77.6 kg (171 lb).    BMI is >= 25 and <30. (Overweight) The following options were offered after discussion;: exercise counseling/recommendations and nutrition counseling/recommendations      Does the patient have evidence of cognitive impairment? No    Lab Results   Component Value Date    CHLPL 136 2024    TRIG 106 2024    HDL 48 2024    LDL 69 2024    VLDL 19 2024        HEALTH RISK ASSESSMENT    Smoking Status:  Social History     Tobacco Use   Smoking Status Former    Current packs/day: 0.00    Types: Cigarettes    Start date:     Quit date:     Years since quittin.3   Smokeless Tobacco Never   Tobacco Comments    quit age 35     Alcohol Consumption:  Social History     Substance and Sexual Activity   Alcohol Use Yes    Comment: RARELY     Fall Risk Screen:    OLGA Fall Risk Assessment was completed, and patient is at LOW risk for falls.Assessment completed on:4/15/2024    Depression Screenin/15/2024     8:31 AM   PHQ-2/PHQ-9 Depression Screening   Little Interest or Pleasure in Doing Things 0-->not at all   Feeling Down, Depressed or Hopeless 0-->not at all   PHQ-9: Brief Depression Severity Measure Score 0       Health Habits and Functional and Cognitive Screenin/15/2024     8:31 AM   Functional & Cognitive Status   Do you have difficulty preparing food and eating? No   Do you have difficulty bathing yourself, getting dressed or grooming yourself? No   Do you have difficulty using the toilet? No   Do you have difficulty moving around from place to place? No   Do you have trouble with steps or getting out of a bed or " a chair? No   Current Diet Well Balanced Diet   Dental Exam Up to date   Eye Exam Up to date   Do you need help using the phone?  No   Are you deaf or do you have serious difficulty hearing?  No   Do you need help to go to places out of walking distance? No   Do you need help shopping? No   Do you need help preparing meals?  No   Do you need help with housework?  No   Do you need help with laundry? No   Do you need help taking your medications? No   Do you need help managing money? No   Do you ever drive or ride in a car without wearing a seat belt? No   Have you felt unusual stress, anger or loneliness in the last month? No   Who do you live with? Spouse   If you need help, do you have trouble finding someone available to you? No   Have you been bothered in the last four weeks by sexual problems? No   Do you have difficulty concentrating, remembering or making decisions? No       Age-appropriate Screening Schedule:  Refer to the list below for future screening recommendations based on patient's age, sex and/or medical conditions. Orders for these recommended tests are listed in the plan section. The patient has been provided with a written plan.    Health Maintenance   Topic Date Due    RSV Vaccine - Adults (1 - 1-dose 60+ series) Never done    COVID-19 Vaccine (8 - 2023-24 season) 09/01/2023    ANNUAL WELLNESS VISIT  01/05/2024    BMI FOLLOWUP  01/05/2024    INFLUENZA VACCINE  08/01/2024    LIPID PANEL  04/08/2025    TDAP/TD VACCINES (2 - Td or Tdap) 06/07/2032    Pneumococcal Vaccine 65+  Completed    ZOSTER VACCINE  Completed                  CMS Preventative Services Quick Reference  Risk Factors Identified During Encounter  Immunizations Discussed/Encouraged: COVID19 and RSV (Respiratory Syncytial Virus)  Inactivity/Sedentary: Patient was advised to exercise at least 150 minutes a week per CDC recommendations.  The above risks/problems have been discussed with the patient.  Pertinent information has been  "shared with the patient in the After Visit Summary.  An After Visit Summary and PPPS were made available to the patient.    Follow Up:   Next Medicare Wellness visit to be scheduled in 1 year.       Additional E&M Note during same encounter follows:  Patient has multiple medical problems which are significant and separately identifiable that require additional work above and beyond the Medicare Wellness Visit.      Chief Complaint  Medicare Wellness-subsequent    Subjective        HPI  Gab Maloney is also being seen today for an annual wellness exam and also to review labs and refill current medications. Overall he feels well. No medication side effects are reported. The following were discussed during today's preventative wellness exam: Nutrition, Physical activity, Healthy weight, Immunizations, and Screenings     Review of Systems   Constitutional:  Negative for fatigue.   HENT: Negative.     Eyes: Negative.    Respiratory: Negative.     Cardiovascular: Negative.    Gastrointestinal: Negative.    Genitourinary:  Positive for frequency. Negative for difficulty urinating.   Musculoskeletal:  Negative for arthralgias and myalgias.   Skin:  Negative for rash.   Neurological:  Negative for dizziness.   Psychiatric/Behavioral:  Negative for dysphoric mood.    All other systems reviewed and are negative.      Objective   Vital Signs:  /70   Pulse 50   Ht 175.3 cm (69\")   Wt 77.6 kg (171 lb)   SpO2 98%   BMI 25.25 kg/m²     Physical Exam  Constitutional:       Appearance: Normal appearance. He is well-developed.   HENT:      Head: Normocephalic.      Right Ear: Hearing, tympanic membrane and external ear normal.      Left Ear: Hearing, tympanic membrane and external ear normal.   Eyes:      General: Lids are normal.      Conjunctiva/sclera: Conjunctivae normal.      Pupils: Pupils are equal, round, and reactive to light.      Funduscopic exam:     Right eye: No AV nicking or papilledema.         Left " eye: No AV nicking or papilledema.   Neck:      Thyroid: No thyroid mass or thyromegaly.      Vascular: No carotid bruit or JVD.      Trachea: Trachea normal.   Cardiovascular:      Rate and Rhythm: Normal rate and regular rhythm.      Pulses: Normal pulses.      Heart sounds: Normal heart sounds. No murmur heard.  Pulmonary:      Effort: Pulmonary effort is normal.      Breath sounds: Normal breath sounds.   Abdominal:      General: Bowel sounds are normal.      Palpations: Abdomen is soft.      Tenderness: There is no abdominal tenderness.      Comments: Abdomen scars noted   Musculoskeletal:         General: Normal range of motion.      Cervical back: Normal range of motion and neck supple.      Lumbar back: No bony tenderness.   Lymphadenopathy:      Cervical: No cervical adenopathy.      Upper Body:      Right upper body: No supraclavicular adenopathy.      Left upper body: No supraclavicular adenopathy.   Skin:     General: Skin is warm and dry.      Findings: No rash.      Nails: There is no clubbing.   Neurological:      Mental Status: He is alert and oriented to person, place, and time.      Cranial Nerves: No cranial nerve deficit.      Deep Tendon Reflexes:      Reflex Scores:       Patellar reflexes are 2+ on the right side and 2+ on the left side.  Psychiatric:         Speech: Speech normal.         Behavior: Behavior normal.         Thought Content: Thought content normal.         Judgment: Judgment normal.          The following data was reviewed by: Franck Moreno MD on 04/15/2024:        PSA DIAGNOSTIC (04/08/2024 08:10)  TSH (04/08/2024 08:10)  CK (04/08/2024 08:10)  Lipid Panel (04/08/2024 08:10)  CBC & Differential (04/08/2024 08:10)  Comprehensive Metabolic Panel (04/08/2024 08:10)       Assessment and Plan   Assessment & Plan  Medicare annual wellness visit, subsequent  Immunizations discussed.  Continue with healthy diet and exercise.  Encounter for wellness examination in adult    Essential  hypertension  The current medical regimen is effective;  continue present plan and medications.    Other hyperlipidemia   The current medical regimen is effective;  continue present plan and medications.    Anemia of unknown etiology  New Problem.  Recommended that patient drink 6 to 8 glasses of water daily.  Recheck labs in 2 months.  Elevated glucose level  Elevated blood sugar noted.  Recommended that patient not eat too many starchy carbohydrates in his diet including sweetened drinks or candy or cookies or snacks  Elevated serum creatinine  Recheck labs 2 months    Orders Placed This Encounter   Procedures    Basic Metabolic Panel    Iron Profile    Methylmalonic Acid, Serum    Folate    Vitamin B12    CBC & Differential     New Medications Ordered This Visit   Medications    lisinopril (PRINIVIL,ZESTRIL) 10 MG tablet     Sig: Take 1 tablet by mouth Every Night for 270 days.     Dispense:  90 tablet     Refill:  2     Please place this prescription on file until needed by the patient.    atorvastatin (LIPITOR) 10 MG tablet     Sig: Take 1 tablet by mouth Every Night for 270 days.     Dispense:  90 tablet     Refill:  2     Please place this prescription on file until needed by the patient.               Follow Up   Return in about 6 months (around 10/15/2024) for recheck/refill medication.  Patient was given instructions and counseling regarding his condition or for health maintenance advice. Please see specific information pulled into the AVS if appropriate.

## 2024-04-15 NOTE — PATIENT INSTRUCTIONS
RSV vaccine is recommended to prevent Respiratory syncytial virus infection. It is recommended for adults over age 60. Please get this vaccination at your local pharmacy.  I have included some information about this infection for your consideration.     Respiratory Syncytial Virus Infection, Adult  Respiratory syncytial virus (RSV) infection is an infection caused by RSV, a common virus. This virus is similar to viruses that cause the common cold and the flu. RSV infection can affect the nose, throat, windpipe, and lungs (respiratory system). When the infection is severe, it can cause:  Bronchiolitis. This condition causes inflammation of the air passages in the lungs (bronchioles).  Pneumonia. This condition causes inflammation of the air sacs in the lungs.  RSV infection spreads from person to person (is contagious) through droplets from coughs and sneezes (respiratory secretions). This condition is rarely serious when it occurs in adults.  What are the causes?  This condition is caused by contact with RSV. This can happen by:  Breathing respiratory secretions from someone who has the infection.  Touching something that has been exposed to the virus (is contaminated) and then touching your mouth, nose, or eyes.  Coming in close contact with someone who has this infection. This may happen if you:  Hug or kiss.  Shake or hold hands.  Eat or drink using the same dishes or utensils.  What increases the risk?  The following factors may make you more likely to develop this condition:  Being 65 years of age or older.  Having certain health conditions, including:  A long-term (chronic) lung condition, such as chronic obstructive pulmonary disease (COPD).  An immune system that is weak. This is your body's defense system.  Down syndrome.  Heart disease.  Working in a hospital or other health care facility.  Living in a long-term health care facility.  RSV infections are most common from the months of November to April, but  they can happen any time of year.  What are the signs or symptoms?  Symptoms of this condition include:  Having a runny nose.  Coughing. You may have a cough that brings up mucus (productive cough).  Sneezing.  Having a fever.  Wanting to eat less than usual.  Breathing loudly (wheezing).  Having shortness of breath.  Having fluid build up in the lungs (respiratory distress).  How is this diagnosed?  This condition may be diagnosed based on:  Your symptoms.  Your medical history.  A physical exam.  A chest X-ray to rule out pneumonia.  Blood tests or tests of mucus from your lungs (sputum). These tests may be done for older adults.  A test of a sample of your respiratory secretions.  How is this treated?  In most cases, the RSV infection will go away after 1-2 weeks of caring for yourself at home.   Sometimes, RSV infection is severe and can cause bronchiolitis or pneumonia. If you develop one or both of these conditions, you may need to be treated in the hospital. You may be given:  Oxygen therapy.  Antiviral medicine.  Medicines to open your bronchioles (bronchodilators).  Follow these instructions at home:  Medicines  Take over-the-counter and prescription medicines only as told by your health care provider.  If you were prescribed an antiviral medicine, take it as told by your health care provider. Do not stop using the antiviral even if you start to feel better.  Lifestyle    Eat a healthy diet.  Do not drink alcohol.  Do not use any products that contain nicotine or tobacco, such as cigarettes, e-cigarettes, and chewing tobacco. If you need help quitting, ask your health care provider.  Rest at home until your symptoms go away.  Return to your normal activities as told by your health care provider. Ask your health care provider what activities are safe for you.  General instructions    Drink enough fluid to keep your urine pale yellow.  Gargle with a salt-water mixture 3-4 times a day or as needed. To make a  salt-water mixture, completely dissolve ½-1 tsp (3-6 g) of salt in 1 cup (237 mL) of warm water.  Keep all follow-up visits as told by your health care provider. This is important.  How is this prevented?  To prevent catching and spreading RSV:  Wash your hands often with soap and water for at least 20 seconds. If soap and water are not available, use hand . Do not touch your face without first cleaning your hands.  Stay home if you have symptoms of the common cold or the flu.  Cover your nose and mouth when you cough or sneeze.  Avoid large groups of people.  Keep a safe distance of about 6 feet (1.8 m) from people who are coughing or sneezing.  Where to find more information  Centers for Disease Control and Prevention: www.cdc.gov  Contact a health care provider if:  Your symptoms get worse or have not changed after 2 weeks.  You have:  A fever.  Hot flashes, sweating, or chills that keep happening.  A cough that brings up much more mucus than usual.  A cough that brings up blood.  You feel:  Very tired (lethargic).  Confused.  Get help right away if:  You have increased or severe trouble breathing.  You lose consciousness.  These symptoms may represent a serious problem that is an emergency. Do not wait to see if the symptoms will go away. Get medical help right away. Call your local emergency services (911 in the [place] Do not drive yourself to the hospital.  Summary  Respiratory syncytial virus (RSV) infection is an infection caused by RSV, a common virus. RSV infection can affect the nose, throat, windpipe, and lungs (respiratory system).  When the infection is severe, it can cause bronchiolitis or pneumonia.  Take over-the-counter and prescription medicines only as told by your health care provider.  Contact a health care provider if your symptoms get worse or have not changed after 2 weeks.  This information is not intended to replace advice given to you by your health care provider. Make sure you  discuss any questions you have with your health care provider.  Document Revised: 2020 Document Reviewed: 10/07/2020  Elsevier Patient Education ©  [employer]     You are due for Covid booster. Please get this vaccine at your local pharmacy. COVID-19 vaccine may be administered without regards to timing of other vaccines.  If administering on the same day as another vaccine, administer in a different site (arm).  COVID-19 VACCINE reduces the risk of COVID-19. It does not treat COVID-19. It is still possible to get COVID-19 after receiving this vaccine, but the symptoms may be less severe or not last as long. It works by helping your immune system learn how to fight off a future infection.     What side effects may I notice from receiving this medication?  Side effects that you should report to your care team as soon as possible:  Allergic reactions--skin rash, itching, hives, swelling of the face, lips, tongue, or throat  Heart muscle inflammation--unusual weakness or fatigue, shortness of breath, chest pain, fast or irregular heartbeat, dizziness, swelling of the ankles, feet, or hands  Side effects that usually do not require medical attention (report these to your care team if they continue or are bothersome):  Chills  Fatigue  Fever  Headache  Joint pain  Muscle pain  Nausea  Pain, redness, or irritation at injection site  Swollen lymph nodes  Vomiting  This list may not describe all possible side effects. Call your doctor for medical advice about side effects. You may report side effects to FDA at 7-108-FDA-7203.       Medicare Wellness  Personal Prevention Plan of Service     Date of Office Visit:    Encounter Provider:  Franck Moreno MD  Place of Service:  [facility]  Patient Name: Gab Maloney  :  1935    As part of the Medicare Wellness portion of your visit today, we are providing you with this personalized preventive plan of services (PPPS). This  plan is based upon recommendations of the [place] Preventive Services Task Force (USPSTF) and the Advisory Committee on Immunization Practices (ACIP).    This lists the preventive care services that should be considered, and provides dates of when you are due. Items listed as completed are up-to-date and do not require any further intervention.    Health Maintenance   Topic Date Due    RSV Vaccine - Adults (1 - 1-dose 60+ series) Never done    COVID-19 Vaccine (8 - 2023-24 season) 09/01/2023    ANNUAL WELLNESS VISIT  01/05/2024    BMI FOLLOWUP  01/05/2024    INFLUENZA VACCINE  08/01/2024    LIPID PANEL  04/08/2025    TDAP/TD VACCINES (2 - Td or Tdap) 06/07/2032    Pneumococcal Vaccine 65+  Completed    ZOSTER VACCINE  Completed       Orders Placed This Encounter   Procedures    Basic Metabolic Panel     Standing Status:   Future     Standing Expiration Date:   4/15/2025     Order Specific Question:   Release to patient     Answer:   Routine Release [[id number]]    Iron Profile     Standing Status:   Future     Standing Expiration Date:   4/15/2025     Order Specific Question:   Release to patient     Answer:   Routine Release [[id number]]    Methylmalonic Acid, Serum     Standing Status:   Future     Standing Expiration Date:   4/15/2025     Order Specific Question:   Release to patient     Answer:   Routine Release [[id number]]    Folate     Standing Status:   Future     Standing Expiration Date:   4/15/2025     Order Specific Question:   Release to patient     Answer:   Routine Release [[id number]]    Vitamin B12     Standing Status:   Future     Standing Expiration Date:   4/15/2025     Order Specific Question:   Release to patient     Answer:   Routine Release [[id number]]    CBC & Differential     Standing Status:   Future     Standing Expiration Date:   4/15/2025     Order Specific Question:   Manual Differential     Answer:   No     Order Specific Question:   Release to patient     Answer:   Routine  Release [[id number]]       No follow-ups on file.

## 2024-06-04 ENCOUNTER — TELEPHONE (OUTPATIENT)
Dept: FAMILY MEDICINE CLINIC | Facility: CLINIC | Age: 89
End: 2024-06-04
Payer: MEDICARE

## 2024-06-04 NOTE — TELEPHONE ENCOUNTER
Caller: Faye Maloney    Relationship to patient: Emergency Contact    Best call back number:     Chief complaint:     Type of visit: LAB    Requested date:      If rescheduling, when is the original appointment:      Additional notes:PATIENTS WIFE FAYE IS CALLING IN TO RESCHEDULE HIS LAB. SHE WANTS TO BE CALLED BACK TO GET THIS SCHEDULED

## 2024-06-25 RX ORDER — ATORVASTATIN CALCIUM 10 MG/1
10 TABLET, FILM COATED ORAL NIGHTLY
Qty: 90 TABLET | Refills: 2 | Status: CANCELLED | OUTPATIENT
Start: 2024-06-25 | End: 2025-03-22

## 2024-06-25 RX ORDER — LISINOPRIL 10 MG/1
10 TABLET ORAL NIGHTLY
Qty: 90 TABLET | Refills: 2 | Status: CANCELLED | OUTPATIENT
Start: 2024-06-25 | End: 2025-03-22

## 2024-06-26 ENCOUNTER — PATIENT OUTREACH (OUTPATIENT)
Dept: CASE MANAGEMENT | Facility: OTHER | Age: 89
End: 2024-06-26
Payer: MEDICARE

## 2024-06-26 ENCOUNTER — OFFICE VISIT (OUTPATIENT)
Dept: FAMILY MEDICINE CLINIC | Facility: CLINIC | Age: 89
End: 2024-06-26
Payer: MEDICARE

## 2024-06-26 ENCOUNTER — REFERRAL TRIAGE (OUTPATIENT)
Dept: CASE MANAGEMENT | Facility: OTHER | Age: 89
End: 2024-06-26
Payer: MEDICARE

## 2024-06-26 ENCOUNTER — TELEPHONE (OUTPATIENT)
Dept: CASE MANAGEMENT | Facility: OTHER | Age: 89
End: 2024-06-26
Payer: MEDICARE

## 2024-06-26 VITALS
WEIGHT: 170 LBS | SYSTOLIC BLOOD PRESSURE: 110 MMHG | BODY MASS INDEX: 25.18 KG/M2 | OXYGEN SATURATION: 99 % | HEART RATE: 55 BPM | HEIGHT: 69 IN | DIASTOLIC BLOOD PRESSURE: 70 MMHG

## 2024-06-26 DIAGNOSIS — I10 ESSENTIAL HYPERTENSION: ICD-10-CM

## 2024-06-26 DIAGNOSIS — E78.49 OTHER HYPERLIPIDEMIA: ICD-10-CM

## 2024-06-26 DIAGNOSIS — D51.3 OTHER DIETARY VITAMIN B12 DEFICIENCY ANEMIA: Primary | ICD-10-CM

## 2024-06-26 RX ORDER — CYANOCOBALAMIN 1000 UG/ML
1000 INJECTION, SOLUTION INTRAMUSCULAR; SUBCUTANEOUS
Status: SHIPPED | OUTPATIENT
Start: 2024-06-26

## 2024-06-26 RX ADMIN — CYANOCOBALAMIN 1000 MCG: 1000 INJECTION, SOLUTION INTRAMUSCULAR; SUBCUTANEOUS at 13:32

## 2024-06-26 NOTE — PROGRESS NOTES
"Chief Complaint  Follow-up (labs)    Subjective        HPI   History of Present Illness  The patient presents for follow-up on labs.    The patient's  has expressed concerns regarding his anemia. Feels cold all of the time. Some fatigue.           Vital Signs:   Vitals:    06/26/24 1021   BP: 110/70   Pulse: 55   SpO2: 99%   Weight: 77.1 kg (170 lb)   Height: 175.3 cm (69\")            4/15/2024     8:31 AM   PHQ-2/PHQ-9 Depression Screening   Little Interest or Pleasure in Doing Things 0-->not at all   Feeling Down, Depressed or Hopeless 0-->not at all   PHQ-9: Brief Depression Severity Measure Score 0                 Physical Exam  Vitals reviewed.   Constitutional:       General: He is not in acute distress.  Cardiovascular:      Rate and Rhythm: Normal rate and regular rhythm.      Heart sounds: Normal heart sounds.   Pulmonary:      Effort: Pulmonary effort is normal.      Breath sounds: Normal breath sounds.   Neurological:      General: No focal deficit present.      Mental Status: He is alert.   Psychiatric:         Attention and Perception: Attention normal.         Mood and Affect: Mood normal.         Behavior: Behavior normal.       Physical Exam  Conjunctiva appear normal.  Thyroid is normal with no goiter.  Lungs are clear. No wheezes.  Heart exhibits a regular rate and rhythm. No murmur, rub, or gallop.         The following data was reviewed by: Franck Moreno MD on 06/26/2024:  Vitamin B12 (06/17/2024 08:48)  Folate (06/17/2024 08:48)  Methylmalonic Acid, Serum (06/17/2024 08:48)  CBC & Differential (06/17/2024 08:48)  Iron Profile (06/17/2024 08:48)  Basic Metabolic Panel (06/17/2024 08:48)    Results  Laboratory Studies  White blood cell count is normal at 7.76. Hemoglobin is 12.8. Blood sugar is 99. BUN is 21 and creatinine is 1.19. Sodium is 139, potassium is 4.9, chloride is 105, and calcium is 9.3. Iron level was 84 and iron saturation is 27. Folate is 10.6. Vitamin B12 level is low " normal. Methylmalonic acid is elevated.                Assessment and Plan     Orders Placed This Encounter   Procedures    Vitamin B12    Methylmalonic Acid, Serum    Comprehensive Metabolic Panel    CK    Lipid Panel With / Chol / HDL Ratio    TSH    CBC & Differential     New Medications Ordered This Visit   Medications    cyanocobalamin injection 1,000 mcg     Assessment & Plan  1. Vitamin B12 deficiency anemia.  The patient exhibits symptoms of persistent cold and fatigue. A regimen of monthly B12 injections will be initiated, with the aim of facilitating the administration at the hospital. Prior to the next visit, labs will be conducted to monitor blood pressure and lipids. A follow-up lab test will be added to the 10/2023 visit to assess the B12 deficiency.         Patient was given instructions and counseling regarding his condition or for health maintenance advice. Please see specific information pulled into the AVS if appropriate.     Patient or patient representative verbalized consent for the use of Ambient Listening during the visit with  Franck Moreno MD for chart documentation. 6/26/2024  11:14 EDT

## 2024-06-26 NOTE — OUTREACH NOTE
AMBULATORY CASE MANAGEMENT NOTE    Names and Relationships of Patient/Support Persons: Caller: Liz; Relationship: Other -     [facility] Interim Update    Spoke with [facility] scheduling regarding B12 injection.    Patient lives closer to [facility] and is needing to continue B12 injections.  Second dose is due and will need to be closer to patient home.    [facility] has faxed order to [facility] [phone].  Once they receive the order patient may schedule by calling central scheduling.  [phone].    [facility] has made patient aware.        Education Documentation  No documentation found.        Judy SMILEY  Ambulatory Case Management    6/26/2024, 11:54 EDT

## 2024-07-08 ENCOUNTER — TELEPHONE (OUTPATIENT)
Dept: CASE MANAGEMENT | Facility: OTHER | Age: 89
End: 2024-07-08
Payer: MEDICARE

## 2024-07-15 DIAGNOSIS — I10 ESSENTIAL HYPERTENSION: ICD-10-CM

## 2024-07-15 RX ORDER — LISINOPRIL 10 MG/1
10 TABLET ORAL NIGHTLY
Qty: 100 TABLET | Refills: 5 | OUTPATIENT
Start: 2024-07-15

## 2024-07-26 ENCOUNTER — LAB (OUTPATIENT)
Dept: LAB | Facility: HOSPITAL | Age: 89
End: 2024-07-26
Payer: MEDICARE

## 2024-07-26 ENCOUNTER — TELEPHONE (OUTPATIENT)
Dept: FAMILY MEDICINE CLINIC | Facility: CLINIC | Age: 89
End: 2024-07-26
Payer: MEDICARE

## 2024-07-26 ENCOUNTER — PATIENT OUTREACH (OUTPATIENT)
Dept: CASE MANAGEMENT | Facility: OTHER | Age: 89
End: 2024-07-26
Payer: MEDICARE

## 2024-07-26 DIAGNOSIS — D51.3 OTHER DIETARY VITAMIN B12 DEFICIENCY ANEMIA: ICD-10-CM

## 2024-07-26 NOTE — TELEPHONE ENCOUNTER
Hub staff attempted to follow warm transfer process and was unsuccessful     Caller: April Maloney    Relationship to patient: Emergency Contact    Best call back number: [phone]     Patient is needing: PATIENT'S WIFE STATED THAT DR. NICOLE ORDERED A B12 SHOT FOR PATIENT AND PATIENT WAS SCHEDULED AT [facility] ON [facility]. PATIENT WAS INFORMED WHEN HE WENT THERE THAT THEY DO NOT DO B12 SHOTS SO SHE WOULD LIKE TO SCHEDULE AN APPOINTMENT IN OFFICE. PLEASE ADVISE.

## 2024-07-26 NOTE — OUTREACH NOTE
AMBULATORY CASE MANAGEMENT NOTE    Names and Relationships of Patient/Support Persons: Contact: April Maloney; Relationship: Emergency Contact -     [facility] Interim Update    Spoke with patient spouse today, verified patient by name.    States that they had an appointment scheduled with [facility] to receive B12 injection.  Further stating that patient was informed that [facility] does not provide injection, and they attempted to check his B12.    Patient spouse stating that they have an appointment scheduled to collect B12 next week in office.    Was able to provide spouse with the contact number for future scheduling at [facility] for B12 injections.    No further needs at this time.  We will close at this time.        Education Documentation  No documentation found.        Judy SMILEY  Ambulatory Case Management    7/26/2024, 15:03 EDT

## 2024-07-30 ENCOUNTER — CLINICAL SUPPORT (OUTPATIENT)
Dept: FAMILY MEDICINE CLINIC | Facility: CLINIC | Age: 89
End: 2024-07-30
Payer: MEDICARE

## 2024-07-30 DIAGNOSIS — D51.3 OTHER DIETARY VITAMIN B12 DEFICIENCY ANEMIA: Primary | ICD-10-CM

## 2024-07-30 PROCEDURE — 96372 THER/PROPH/DIAG INJ SC/IM: CPT | Performed by: FAMILY MEDICINE

## 2024-07-30 RX ADMIN — CYANOCOBALAMIN 1000 MCG: 1000 INJECTION, SOLUTION INTRAMUSCULAR; SUBCUTANEOUS at 10:59

## 2024-08-21 ENCOUNTER — TRANSCRIBE ORDERS (OUTPATIENT)
Dept: ADMINISTRATIVE | Facility: HOSPITAL | Age: 89
End: 2024-08-21
Payer: MEDICARE

## 2024-08-21 DIAGNOSIS — E53.8 CYANOCOBALAMINE DEFICIENCY (NON ANEMIC): Primary | ICD-10-CM

## 2024-08-22 ENCOUNTER — TELEPHONE (OUTPATIENT)
Dept: FAMILY MEDICINE CLINIC | Facility: CLINIC | Age: 89
End: 2024-08-22
Payer: MEDICARE

## 2024-08-22 DIAGNOSIS — D51.3 OTHER DIETARY VITAMIN B12 DEFICIENCY ANEMIA: Primary | ICD-10-CM

## 2024-08-22 NOTE — TELEPHONE ENCOUNTER
Caller: [facility]    Relationship: Other    Best call back number: [phone]  FAX :[phone]    What medication are you requesting: VIT B 12 INJECTION    AMBULATORY CARE NEEDS ORDER FOR MONTHLY VITAMIN B12 INJECTIONS FOR THIS PATIENT  NEED ORDER BY TOMORROW OR PATIENT WILL NEED TO RESCHEDULE

## 2024-08-23 ENCOUNTER — TELEPHONE (OUTPATIENT)
Dept: FAMILY MEDICINE CLINIC | Facility: CLINIC | Age: 89
End: 2024-08-23
Payer: MEDICARE

## 2024-08-23 NOTE — TELEPHONE ENCOUNTER
Called [facility] pascale, avni Alvarado, let him know I am waiting for Dr. Moreno to sign off on this, I will fax orders once I have them.

## 2024-08-23 NOTE — TELEPHONE ENCOUNTER
Caller: [facility] -  AMBULATORY CARE    Relationship: Other    Best call back number: [phone]     What was the call regarding: [facility] STATED THAT THEY HAVE RESCHEDULED PATIENT TO WEDNESDAY 08/28/24 AT 12:30PM. THEY HAVE TO HAVE THE ORDER SENT TO THEM BY MONDAY AND THEY ARE ASKING THAT PATIENT BE NOTIFIED OF THIS BY PCP OFFICE. PLEASE ADVISE.

## 2024-08-23 NOTE — TELEPHONE ENCOUNTER
Caller: [facility]    Relationship: Other    Best call back number:  [phone]  FAX :[phone]    What medication are you requesting: B - 12     What are your current symptoms: VITAMIN B 12     Additional notes:AMBULATORY CARE NEEDS ORDER FOR MONTHLY VITAMIN B12 INJECTIONS FOR THIS PATIENT  NEED ORDER BY TOMORROW OR PATIENT WILL NEED TO RESCHEDULE     PLEASE FOLLOW UP. E[facility] STATES THAT IF THIS IS NOT DONE TODAY, THEY WILL HAVE TO CANCEL ORDER AND WAIT TO RESCHEDULE PATIENT.     PREVIOUS ENCOUNTER WAS SIGNED.

## 2024-08-26 ENCOUNTER — HOSPITAL ENCOUNTER (OUTPATIENT)
Dept: INFUSION THERAPY | Facility: HOSPITAL | Age: 89
Discharge: HOME | End: 2024-08-26
Payer: MEDICARE

## 2024-08-26 RX ORDER — CYANOCOBALAMIN 1000 UG/ML
1000 INJECTION, SOLUTION INTRAMUSCULAR; SUBCUTANEOUS ONCE
Status: CANCELLED | OUTPATIENT
Start: 2024-08-26

## 2024-08-28 ENCOUNTER — HOSPITAL ENCOUNTER (OUTPATIENT)
Dept: INFUSION THERAPY | Facility: HOSPITAL | Age: 89
Discharge: HOME | End: 2024-08-28
Admitting: FAMILY MEDICINE
Payer: MEDICARE

## 2024-08-28 VITALS
HEART RATE: 62 BPM | TEMPERATURE: 97.8 F | DIASTOLIC BLOOD PRESSURE: 75 MMHG | RESPIRATION RATE: 18 BRPM | SYSTOLIC BLOOD PRESSURE: 148 MMHG | OXYGEN SATURATION: 96 %

## 2024-08-28 DIAGNOSIS — D51.3 OTHER DIETARY VITAMIN B12 DEFICIENCY ANEMIA: Primary | ICD-10-CM

## 2024-08-28 PROCEDURE — 96372 THER/PROPH/DIAG INJ SC/IM: CPT

## 2024-08-28 PROCEDURE — 25010000002 CYANOCOBALAMIN PER 1000 MCG: Performed by: FAMILY MEDICINE

## 2024-08-28 RX ORDER — CYANOCOBALAMIN 1000 UG/ML
1000 INJECTION, SOLUTION INTRAMUSCULAR; SUBCUTANEOUS ONCE
Status: COMPLETED | OUTPATIENT
Start: 2024-08-28 | End: 2024-08-28

## 2024-08-28 RX ORDER — CYANOCOBALAMIN 1000 UG/ML
1000 INJECTION, SOLUTION INTRAMUSCULAR; SUBCUTANEOUS ONCE
OUTPATIENT
Start: 2024-09-25

## 2024-08-28 RX ADMIN — CYANOCOBALAMIN 1000 MCG: 1000 INJECTION, SOLUTION INTRAMUSCULAR; SUBCUTANEOUS at 12:38

## 2024-08-28 NOTE — PROGRESS NOTES
"Patient did not want labs drawn today. He stated \"they aren't due until one week prior to appointment with doctor.\"  B 12 injection completed every 28 days, previous injections without side effects. Discharged per ambulation from [facility].   "

## 2024-09-26 ENCOUNTER — HOSPITAL ENCOUNTER (OUTPATIENT)
Dept: INFUSION THERAPY | Facility: HOSPITAL | Age: 89
Discharge: HOME | End: 2024-09-26
Payer: MEDICARE

## 2024-09-26 VITALS
RESPIRATION RATE: 18 BRPM | OXYGEN SATURATION: 98 % | DIASTOLIC BLOOD PRESSURE: 76 MMHG | TEMPERATURE: 96.9 F | SYSTOLIC BLOOD PRESSURE: 134 MMHG | HEART RATE: 59 BPM

## 2024-09-26 DIAGNOSIS — D51.3 OTHER DIETARY VITAMIN B12 DEFICIENCY ANEMIA: Primary | ICD-10-CM

## 2024-09-26 PROCEDURE — 25010000002 CYANOCOBALAMIN PER 1000 MCG: Performed by: FAMILY MEDICINE

## 2024-09-26 PROCEDURE — 96372 THER/PROPH/DIAG INJ SC/IM: CPT

## 2024-09-26 RX ORDER — CYANOCOBALAMIN 1000 UG/ML
1000 INJECTION, SOLUTION INTRAMUSCULAR; SUBCUTANEOUS ONCE
OUTPATIENT
Start: 2024-10-23

## 2024-09-26 RX ORDER — CYANOCOBALAMIN 1000 UG/ML
1000 INJECTION, SOLUTION INTRAMUSCULAR; SUBCUTANEOUS ONCE
Status: COMPLETED | OUTPATIENT
Start: 2024-09-26 | End: 2024-09-26

## 2024-09-26 RX ADMIN — CYANOCOBALAMIN 1000 MCG: 1000 INJECTION INTRAMUSCULAR; SUBCUTANEOUS at 09:30

## 2024-10-16 ENCOUNTER — OFFICE VISIT (OUTPATIENT)
Dept: FAMILY MEDICINE CLINIC | Facility: CLINIC | Age: 89
End: 2024-10-16
Payer: MEDICARE

## 2024-10-16 VITALS
BODY MASS INDEX: 24.88 KG/M2 | HEART RATE: 55 BPM | SYSTOLIC BLOOD PRESSURE: 118 MMHG | DIASTOLIC BLOOD PRESSURE: 58 MMHG | OXYGEN SATURATION: 98 % | HEIGHT: 69 IN | WEIGHT: 168 LBS

## 2024-10-16 DIAGNOSIS — R35.1 NOCTURIA: ICD-10-CM

## 2024-10-16 DIAGNOSIS — E78.49 OTHER HYPERLIPIDEMIA: ICD-10-CM

## 2024-10-16 DIAGNOSIS — I10 ESSENTIAL HYPERTENSION: Primary | ICD-10-CM

## 2024-10-16 DIAGNOSIS — R79.89 ELEVATED TSH: ICD-10-CM

## 2024-10-16 DIAGNOSIS — D51.3 OTHER DIETARY VITAMIN B12 DEFICIENCY ANEMIA: ICD-10-CM

## 2024-10-16 PROCEDURE — 1126F AMNT PAIN NOTED NONE PRSNT: CPT | Performed by: FAMILY MEDICINE

## 2024-10-16 PROCEDURE — 99214 OFFICE O/P EST MOD 30 MIN: CPT | Performed by: FAMILY MEDICINE

## 2024-10-16 PROCEDURE — G2211 COMPLEX E/M VISIT ADD ON: HCPCS | Performed by: FAMILY MEDICINE

## 2024-10-16 RX ORDER — LISINOPRIL 10 MG/1
10 TABLET ORAL NIGHTLY
Qty: 90 TABLET | Refills: 2 | Status: SHIPPED | OUTPATIENT
Start: 2024-10-16 | End: 2025-07-13

## 2024-10-16 RX ORDER — ATORVASTATIN CALCIUM 10 MG/1
10 TABLET, FILM COATED ORAL NIGHTLY
Qty: 90 TABLET | Refills: 2 | Status: SHIPPED | OUTPATIENT
Start: 2024-10-16 | End: 2025-07-13

## 2024-10-16 NOTE — PROGRESS NOTES
"Chief Complaint  Follow-up (6 month), Hypertension, Hyperlipidemia, Anemia, Elevated glucose level, and Elevated serum creatinine    Subjective        Follow-up  Conditions present:  Hyperlipidemia    Hypertension    Hyperlipidemia    Anemia          Patient here today for medication refill.  Overall feels well.  Labs have been reviewed and are seen below.           Vital Signs:   Vitals:    10/16/24 0813   BP: 118/58   Pulse: 55   SpO2: 98%   Weight: 76.2 kg (168 lb)   Height: 175.3 cm (69\")            4/15/2024     8:31 AM   PHQ-2/PHQ-9 Depression Screening   Retired Little Interest or Pleasure in Doing Things 0-->not at all   Retired Feeling Down, Depressed or Hopeless 0-->not at all   Retired PHQ-9: Brief Depression Severity Measure Score 0       BMI is within normal parameters. No other follow-up for BMI required.        Physical Exam  Vitals reviewed.   Constitutional:       General: He is not in acute distress.  Eyes:      General: Lids are normal.      Conjunctiva/sclera: Conjunctivae normal.   Neck:      Vascular: No carotid bruit.      Trachea: No tracheal deviation.   Cardiovascular:      Rate and Rhythm: Normal rate and regular rhythm.      Heart sounds: Normal heart sounds. No murmur heard.  Pulmonary:      Effort: Pulmonary effort is normal.      Breath sounds: Normal breath sounds.   Skin:     General: Skin is warm and dry.   Neurological:      Mental Status: He is alert. He is not disoriented.   Psychiatric:         Speech: Speech normal.         Behavior: Behavior normal. Behavior is cooperative.                 The following data was reviewed by: Franck Moreno MD on 10/16/2024:    TSH (10/03/2024 10:07)  Lipid Panel With / Chol / HDL Ratio (10/03/2024 10:07)  CK (10/03/2024 10:07)  CBC & Differential (10/03/2024 10:07)  Comprehensive Metabolic Panel (10/03/2024 10:07)  Methylmalonic Acid, Serum (10/03/2024 10:07)  Results                  Assessment and Plan     Assessment & Plan  Other " hyperlipidemia    condition is stable. The current medical regimen is effective;  continue present plan and medications.    Orders:    atorvastatin (LIPITOR) 10 MG tablet; Take 1 tablet by mouth Every Night for 270 days.    Lipid Panel With / Chol / HDL Ratio; Future    CK; Future    Essential hypertension  The current medical regimen is effective;  continue present plan and medications.      Orders:    lisinopril (PRINIVIL,ZESTRIL) 10 MG tablet; Take 1 tablet by mouth Every Night for 270 days.    Comprehensive Metabolic Panel; Future    CBC & Differential; Future    TSH; Future    Other dietary vitamin B12 deficiency anemia  Condition improving. Continue with monthly b12 injections.  Orders:    Vitamin B12; Future    Methylmalonic Acid, Serum; Future    Elevated TSH  Continue to monitor condition  Orders:    TSH; Future        Return in about 6 months (around 4/16/2025) for Medicare Wellness & regular visit, [id number] min.     Patient was given instructions and counseling regarding his condition or for health maintenance advice. Please see specific information pulled into the AVS if appropriate.     Patient or patient representative verbalized consent for the use of Ambient Listening during the visit with  Franck Moreno MD for chart documentation. 10/16/2024  08:46 EDT

## 2024-10-16 NOTE — ASSESSMENT & PLAN NOTE
condition is stable. The current medical regimen is effective;  continue present plan and medications.    Orders:    atorvastatin (LIPITOR) 10 MG tablet; Take 1 tablet by mouth Every Night for 270 days.    Lipid Panel With / Chol / HDL Ratio; Future    CK; Future

## 2024-10-16 NOTE — ASSESSMENT & PLAN NOTE
Condition improving. Continue with monthly b12 injections.  Orders:    Vitamin B12; Future    Methylmalonic Acid, Serum; Future

## 2024-10-16 NOTE — ASSESSMENT & PLAN NOTE
The current medical regimen is effective;  continue present plan and medications.      Orders:    lisinopril (PRINIVIL,ZESTRIL) 10 MG tablet; Take 1 tablet by mouth Every Night for 270 days.    Comprehensive Metabolic Panel; Future    CBC & Differential; Future    TSH; Future

## 2024-10-29 ENCOUNTER — HOSPITAL ENCOUNTER (OUTPATIENT)
Dept: INFUSION THERAPY | Facility: HOSPITAL | Age: 89
Discharge: HOME | End: 2024-10-29
Admitting: FAMILY MEDICINE
Payer: MEDICARE

## 2024-10-29 VITALS
TEMPERATURE: 97.7 F | RESPIRATION RATE: 18 BRPM | DIASTOLIC BLOOD PRESSURE: 64 MMHG | HEART RATE: 52 BPM | OXYGEN SATURATION: 100 % | SYSTOLIC BLOOD PRESSURE: 158 MMHG

## 2024-10-29 DIAGNOSIS — D51.3 OTHER DIETARY VITAMIN B12 DEFICIENCY ANEMIA: Primary | ICD-10-CM

## 2024-10-29 PROCEDURE — 25010000002 CYANOCOBALAMIN PER 1000 MCG: Performed by: FAMILY MEDICINE

## 2024-10-29 PROCEDURE — 96372 THER/PROPH/DIAG INJ SC/IM: CPT

## 2024-10-29 RX ORDER — CYANOCOBALAMIN 1000 UG/ML
1000 INJECTION, SOLUTION INTRAMUSCULAR; SUBCUTANEOUS ONCE
Status: COMPLETED | OUTPATIENT
Start: 2024-10-29 | End: 2024-10-29

## 2024-10-29 RX ORDER — CYANOCOBALAMIN 1000 UG/ML
1000 INJECTION, SOLUTION INTRAMUSCULAR; SUBCUTANEOUS ONCE
OUTPATIENT
Start: 2024-11-21

## 2024-10-29 RX ADMIN — CYANOCOBALAMIN 1000 MCG: 1000 INJECTION INTRAMUSCULAR; SUBCUTANEOUS at 12:35

## 2024-11-19 NOTE — TELEPHONE ENCOUNTER
Caller: RISSA SEGURA    Relationship to patient: SELF    Best call back number: [phone]    Chief complaint: LOW BACK PAIN WITH PAIN GOING INTO LEG, LEG NUMB WITH SHOOTING PAIN    Type of visit: FOLLOW UP    Requested date: ASAP     If rescheduling, when is the original appointment:     Additional notes:PT HAS TRIED PHYSICAL THERAPY WHICH MADE PAIN WORSE.              "Subjective:      Patient ID: Odell Cherry is a 2 y.o. male.    Chief Complaint: Nasal Congestion (Mom is present with patient. States has runny nose. Onset 2-3 days. Also states that pt has been rubbing and wiping nose until irritated. Mom applied cerave ointment and Aquaphor around area. Given Allegra. Denies fever. ) and Sore Throat (Pt c/o sore throat on Sunday. )    Sunday started with congestion and sore throat.  There has been no fever.  Eyes are watery.  He can not breath through his nose, and his nose hurts.  He does have small cough in the morning.  He is not coughing throughout the day.  He is not coughing at night.  Mom is doing saline spray up his nose.      Sore Throat  Associated symptoms include congestion and a sore throat. Pertinent negatives include no abdominal pain, coughing, fever, nausea, neck pain or vomiting.     Review of Systems   Constitutional:  Positive for activity change. Negative for appetite change and fever.   HENT:  Positive for congestion, rhinorrhea and sore throat. Negative for ear pain and trouble swallowing.    Eyes:  Negative for pain, discharge, redness and itching.   Respiratory:  Negative for cough.    Gastrointestinal:  Negative for abdominal pain, diarrhea, nausea and vomiting.   Genitourinary:  Negative for decreased urine volume, difficulty urinating and dysuria.   Musculoskeletal:  Negative for gait problem and neck pain.   Skin:  Negative for wound.   Psychiatric/Behavioral:  Positive for sleep disturbance. Negative for behavioral problems.       Objective:     Vitals:    11/19/24 1423   Pulse: 116   Resp: 24   Temp: 98.2 °F (36.8 °C)     Vitals:    11/19/24 1423   Pulse: 116   Resp: 24   Temp: 98.2 °F (36.8 °C)   TempSrc: Axillary   SpO2: 98%   Weight: 15.6 kg (34 lb 8 oz)   Height: 3' 3.69" (1.008 m)       Physical Exam  Constitutional:       General: He is active. He is not in acute distress.     Appearance: Normal appearance. He is well-developed and normal " weight. He is not toxic-appearing.   HENT:      Head: Normocephalic.      Right Ear: Tympanic membrane normal. There is no impacted cerumen. Tympanic membrane is not erythematous.      Left Ear: Tympanic membrane normal. There is no impacted cerumen. Tympanic membrane is not erythematous.      Nose: Congestion and rhinorrhea present.      Mouth/Throat:      Pharynx: No oropharyngeal exudate or posterior oropharyngeal erythema.   Eyes:      General:         Right eye: No discharge.         Left eye: No discharge.      Conjunctiva/sclera: Conjunctivae normal.      Pupils: Pupils are equal, round, and reactive to light.   Cardiovascular:      Rate and Rhythm: Normal rate and regular rhythm.   Pulmonary:      Effort: Pulmonary effort is normal. No respiratory distress, nasal flaring or retractions.      Breath sounds: Normal breath sounds. No stridor or decreased air movement. No wheezing.   Abdominal:      General: There is no distension.      Tenderness: There is no abdominal tenderness. There is no guarding.   Musculoskeletal:      Cervical back: No rigidity.   Lymphadenopathy:      Cervical: No cervical adenopathy.   Skin:     Findings: No erythema or rash.   Neurological:      Mental Status: He is alert.       Assessment:      1. Congestion of nasal sinus    2. Right otitis media with effusion    3. RSV (acute bronchiolitis due to respiratory syncytial virus)    4. Cough, unspecified type      Plan:     Congestion of nasal sinus  -     POCT Influenza A/B Molecular  -     POCT RSV by Molecular  -     fluticasone propionate (FLONASE) 50 mcg/actuation nasal spray; 1 spray (50 mcg total) by Each Nostril route once daily. for 10 days  Dispense: 11.1 mL; Refill: 0    Right otitis media with effusion  -     cefdinir (OMNICEF) 250 mg/5 mL suspension; Take 4.4 mLs (220 mg total) by mouth once daily. for 10 days  Dispense: 44 mL; Refill: 0    RSV (acute bronchiolitis due to respiratory syncytial virus)    Cough, unspecified  type  -     albuterol (ACCUNEB) 1.25 mg/3 mL Nebu; Take 3 mLs (1.25 mg total) by nebulization every 6 (six) hours as needed (cough). Rescue  Dispense: 60 each; Refill: 0      Follow up if symptoms worsen or fail to improve.

## 2024-11-27 ENCOUNTER — HOSPITAL ENCOUNTER (OUTPATIENT)
Dept: INFUSION THERAPY | Facility: HOSPITAL | Age: 89
Discharge: HOME | End: 2024-11-27
Admitting: FAMILY MEDICINE
Payer: MEDICARE

## 2024-11-27 VITALS
OXYGEN SATURATION: 96 % | DIASTOLIC BLOOD PRESSURE: 66 MMHG | SYSTOLIC BLOOD PRESSURE: 149 MMHG | HEART RATE: 60 BPM | RESPIRATION RATE: 18 BRPM | TEMPERATURE: 97 F

## 2024-11-27 DIAGNOSIS — D51.3 OTHER DIETARY VITAMIN B12 DEFICIENCY ANEMIA: Primary | ICD-10-CM

## 2024-11-27 PROCEDURE — 96372 THER/PROPH/DIAG INJ SC/IM: CPT

## 2024-11-27 PROCEDURE — 25010000002 CYANOCOBALAMIN PER 1000 MCG: Performed by: FAMILY MEDICINE

## 2024-11-27 RX ORDER — CYANOCOBALAMIN 1000 UG/ML
1000 INJECTION, SOLUTION INTRAMUSCULAR; SUBCUTANEOUS ONCE
Status: COMPLETED | OUTPATIENT
Start: 2024-11-27 | End: 2024-11-27

## 2024-11-27 RX ORDER — CYANOCOBALAMIN 1000 UG/ML
1000 INJECTION, SOLUTION INTRAMUSCULAR; SUBCUTANEOUS ONCE
OUTPATIENT
Start: 2024-12-24

## 2024-11-27 RX ADMIN — CYANOCOBALAMIN 1000 MCG: 1000 INJECTION INTRAMUSCULAR; SUBCUTANEOUS at 12:27

## 2024-12-26 ENCOUNTER — HOSPITAL ENCOUNTER (OUTPATIENT)
Dept: INFUSION THERAPY | Facility: HOSPITAL | Age: 89
Discharge: HOME | End: 2024-12-26
Admitting: FAMILY MEDICINE
Payer: MEDICARE

## 2024-12-26 VITALS
SYSTOLIC BLOOD PRESSURE: 148 MMHG | DIASTOLIC BLOOD PRESSURE: 68 MMHG | HEART RATE: 59 BPM | OXYGEN SATURATION: 99 % | RESPIRATION RATE: 18 BRPM | TEMPERATURE: 97.6 F

## 2024-12-26 DIAGNOSIS — D51.3 OTHER DIETARY VITAMIN B12 DEFICIENCY ANEMIA: Primary | ICD-10-CM

## 2024-12-26 PROCEDURE — 96372 THER/PROPH/DIAG INJ SC/IM: CPT

## 2024-12-26 PROCEDURE — 25010000002 CYANOCOBALAMIN PER 1000 MCG: Performed by: FAMILY MEDICINE

## 2024-12-26 RX ORDER — CYANOCOBALAMIN 1000 UG/ML
1000 INJECTION, SOLUTION INTRAMUSCULAR; SUBCUTANEOUS ONCE
OUTPATIENT
Start: 2025-01-22

## 2024-12-26 RX ORDER — CYANOCOBALAMIN 1000 UG/ML
1000 INJECTION, SOLUTION INTRAMUSCULAR; SUBCUTANEOUS ONCE
Status: COMPLETED | OUTPATIENT
Start: 2024-12-26 | End: 2024-12-26

## 2024-12-26 RX ADMIN — CYANOCOBALAMIN 1000 MCG: 1000 INJECTION INTRAMUSCULAR; SUBCUTANEOUS at 12:03

## 2025-01-30 ENCOUNTER — HOSPITAL ENCOUNTER (OUTPATIENT)
Dept: INFUSION THERAPY | Facility: HOSPITAL | Age: OVER 89
Discharge: HOME | End: 2025-01-30
Payer: MEDICARE

## 2025-01-30 VITALS
OXYGEN SATURATION: 98 % | SYSTOLIC BLOOD PRESSURE: 142 MMHG | DIASTOLIC BLOOD PRESSURE: 77 MMHG | TEMPERATURE: 97.6 F | RESPIRATION RATE: 18 BRPM | HEART RATE: 75 BPM

## 2025-01-30 DIAGNOSIS — D51.3 OTHER DIETARY VITAMIN B12 DEFICIENCY ANEMIA: Primary | ICD-10-CM

## 2025-01-30 PROCEDURE — 96372 THER/PROPH/DIAG INJ SC/IM: CPT

## 2025-01-30 PROCEDURE — 25010000002 CYANOCOBALAMIN PER 1000 MCG: Performed by: FAMILY MEDICINE

## 2025-01-30 RX ORDER — CYANOCOBALAMIN 1000 UG/ML
1000 INJECTION, SOLUTION INTRAMUSCULAR; SUBCUTANEOUS ONCE
Status: COMPLETED | OUTPATIENT
Start: 2025-01-30 | End: 2025-01-30

## 2025-01-30 RX ORDER — CYANOCOBALAMIN 1000 UG/ML
1000 INJECTION, SOLUTION INTRAMUSCULAR; SUBCUTANEOUS ONCE
OUTPATIENT
Start: 2025-02-20

## 2025-01-30 RX ADMIN — CYANOCOBALAMIN 1000 MCG: 1000 INJECTION, SOLUTION INTRAMUSCULAR; SUBCUTANEOUS at 11:59

## 2025-02-27 ENCOUNTER — HOSPITAL ENCOUNTER (OUTPATIENT)
Dept: INFUSION THERAPY | Facility: HOSPITAL | Age: OVER 89
Discharge: HOME | End: 2025-02-27
Payer: MEDICARE

## 2025-02-27 VITALS
SYSTOLIC BLOOD PRESSURE: 126 MMHG | DIASTOLIC BLOOD PRESSURE: 64 MMHG | TEMPERATURE: 97.6 F | OXYGEN SATURATION: 99 % | RESPIRATION RATE: 16 BRPM | HEART RATE: 55 BPM

## 2025-02-27 DIAGNOSIS — D51.3 OTHER DIETARY VITAMIN B12 DEFICIENCY ANEMIA: Primary | ICD-10-CM

## 2025-02-27 PROCEDURE — 25010000002 CYANOCOBALAMIN PER 1000 MCG: Performed by: FAMILY MEDICINE

## 2025-02-27 PROCEDURE — 96372 THER/PROPH/DIAG INJ SC/IM: CPT

## 2025-02-27 RX ORDER — CYANOCOBALAMIN 1000 UG/ML
1000 INJECTION, SOLUTION INTRAMUSCULAR; SUBCUTANEOUS ONCE
OUTPATIENT
Start: 2025-03-27

## 2025-02-27 RX ORDER — CYANOCOBALAMIN 1000 UG/ML
1000 INJECTION, SOLUTION INTRAMUSCULAR; SUBCUTANEOUS ONCE
Status: COMPLETED | OUTPATIENT
Start: 2025-02-27 | End: 2025-02-27

## 2025-02-27 RX ADMIN — CYANOCOBALAMIN 1000 MCG: 1000 INJECTION, SOLUTION INTRAMUSCULAR; SUBCUTANEOUS at 12:23

## 2025-03-12 ENCOUNTER — TELEPHONE (OUTPATIENT)
Dept: FAMILY MEDICINE CLINIC | Facility: CLINIC | Age: OVER 89
End: 2025-03-12
Payer: MEDICARE

## 2025-03-12 NOTE — TELEPHONE ENCOUNTER
Caller: April Maloney    Relationship to patient: Emergency Contact    Best call back number: [phone]     Type of visit: LABS    Requested date: PRIOR TO MEDICARE WELLNESS 04-    Additional notes:PATIENTS WIFE IS REQUESTING TO SCHEDULE LABS PRIOR TO MEDICARE WELLNESS VISIT.    PLEASE CALL PATIENTS WIFE TO SCHEDULE.

## 2025-03-27 ENCOUNTER — HOSPITAL ENCOUNTER (OUTPATIENT)
Dept: INFUSION THERAPY | Facility: HOSPITAL | Age: OVER 89
Discharge: HOME | End: 2025-03-27
Payer: MEDICARE

## 2025-03-27 VITALS
HEART RATE: 61 BPM | DIASTOLIC BLOOD PRESSURE: 72 MMHG | TEMPERATURE: 96.6 F | RESPIRATION RATE: 16 BRPM | SYSTOLIC BLOOD PRESSURE: 132 MMHG | OXYGEN SATURATION: 98 %

## 2025-03-27 DIAGNOSIS — D51.3 OTHER DIETARY VITAMIN B12 DEFICIENCY ANEMIA: Primary | ICD-10-CM

## 2025-03-27 PROCEDURE — 25010000002 CYANOCOBALAMIN PER 1000 MCG: Performed by: FAMILY MEDICINE

## 2025-03-27 PROCEDURE — 96372 THER/PROPH/DIAG INJ SC/IM: CPT

## 2025-03-27 RX ORDER — CYANOCOBALAMIN 1000 UG/ML
1000 INJECTION, SOLUTION INTRAMUSCULAR; SUBCUTANEOUS ONCE
Status: COMPLETED | OUTPATIENT
Start: 2025-03-27 | End: 2025-03-27

## 2025-03-27 RX ORDER — CYANOCOBALAMIN 1000 UG/ML
1000 INJECTION, SOLUTION INTRAMUSCULAR; SUBCUTANEOUS ONCE
OUTPATIENT
Start: 2025-04-24

## 2025-03-27 RX ADMIN — CYANOCOBALAMIN 1000 MCG: 1000 INJECTION, SOLUTION INTRAMUSCULAR; SUBCUTANEOUS at 12:21

## 2025-04-16 ENCOUNTER — OFFICE VISIT (OUTPATIENT)
Dept: FAMILY MEDICINE CLINIC | Facility: CLINIC | Age: OVER 89
End: 2025-04-16
Payer: MEDICARE

## 2025-04-16 VITALS
SYSTOLIC BLOOD PRESSURE: 124 MMHG | WEIGHT: 167 LBS | HEART RATE: 54 BPM | BODY MASS INDEX: 24.73 KG/M2 | OXYGEN SATURATION: 98 % | HEIGHT: 69 IN | DIASTOLIC BLOOD PRESSURE: 66 MMHG

## 2025-04-16 DIAGNOSIS — E78.49 OTHER HYPERLIPIDEMIA: ICD-10-CM

## 2025-04-16 DIAGNOSIS — D51.3 OTHER DIETARY VITAMIN B12 DEFICIENCY ANEMIA: ICD-10-CM

## 2025-04-16 DIAGNOSIS — Z00.01 ENCOUNTER FOR WELL ADULT EXAM WITH ABNORMAL FINDINGS: ICD-10-CM

## 2025-04-16 DIAGNOSIS — Z00.00 MEDICARE ANNUAL WELLNESS VISIT, SUBSEQUENT: Primary | ICD-10-CM

## 2025-04-16 DIAGNOSIS — E03.9 ACQUIRED HYPOTHYROIDISM: ICD-10-CM

## 2025-04-16 DIAGNOSIS — I10 ESSENTIAL HYPERTENSION: ICD-10-CM

## 2025-04-16 DIAGNOSIS — M25.561 ARTHRALGIA OF KNEE, RIGHT: ICD-10-CM

## 2025-04-16 DIAGNOSIS — R35.1 NOCTURIA: ICD-10-CM

## 2025-04-16 PROBLEM — G89.4 CHRONIC PAIN DISORDER: Status: RESOLVED | Noted: 2022-08-16 | Resolved: 2025-04-16

## 2025-04-16 PROBLEM — M47.816 LUMBAR SPONDYLOSIS: Status: RESOLVED | Noted: 2022-08-16 | Resolved: 2025-04-16

## 2025-04-16 PROBLEM — M54.42 ACUTE LEFT-SIDED LOW BACK PAIN WITH LEFT-SIDED SCIATICA: Status: RESOLVED | Noted: 2021-12-23 | Resolved: 2025-04-16

## 2025-04-16 PROBLEM — Z98.890 STATUS POST SURGERY: Status: RESOLVED | Noted: 2022-09-02 | Resolved: 2025-04-16

## 2025-04-16 RX ORDER — LISINOPRIL 10 MG/1
10 TABLET ORAL NIGHTLY
Qty: 90 TABLET | Refills: 2 | Status: SHIPPED | OUTPATIENT
Start: 2025-04-16 | End: 2026-01-11

## 2025-04-16 RX ORDER — ATORVASTATIN CALCIUM 10 MG/1
10 TABLET, FILM COATED ORAL NIGHTLY
Qty: 90 TABLET | Refills: 2 | Status: SHIPPED | OUTPATIENT
Start: 2025-04-16 | End: 2026-01-11

## 2025-04-16 RX ORDER — LEVOTHYROXINE SODIUM 50 UG/1
50 TABLET ORAL
Qty: 90 TABLET | Refills: 2 | Status: SHIPPED | OUTPATIENT
Start: 2025-04-16 | End: 2026-01-11

## 2025-04-16 NOTE — ASSESSMENT & PLAN NOTE
Newly identified, start Euthyrox 50 mcg am. Recheck labs in 6 months.   Orders:    Euthyrox 50 MCG tablet; Take 1 tablet by mouth Every Morning for 270 days.    TSH; Future

## 2025-04-16 NOTE — ASSESSMENT & PLAN NOTE
The current medical regimen is effective;  continue present plan and medications.    Orders:    atorvastatin (LIPITOR) 10 MG tablet; Take 1 tablet by mouth Every Night for 270 days.    Lipid Panel With LDL / HDL Ratio; Future    CK; Future

## 2025-04-16 NOTE — ASSESSMENT & PLAN NOTE
The current medical regimen is effective;  continue present plan and medications.    Orders:    CBC & Differential; Future    Vitamin B12; Future    Methylmalonic Acid, Serum; Future    Folate; Future

## 2025-04-16 NOTE — PROGRESS NOTES
Subjective   The ABCs of the Annual Wellness Visit  Medicare Wellness Visit      Gab Maloney is a 89 y.o. patient who presents for a Medicare Wellness Visit.    The following portions of the patient's history were reviewed and   updated as appropriate: allergies, current medications, past family history, past medical history, past social history, past surgical history, and problem list.    Compared to one year ago, the patient's physical   health is the same.  Compared to one year ago, the patient's mental   health is the same.    Recent Hospitalizations:  He was not admitted to the hospital during the last year.     Current Medical Providers:  Patient Care Team:  Franck Moreno MD as PCP - General  Franck Moreno MD as PCP - Family Medicine    Outpatient Medications Prior to Visit   Medication Sig Dispense Refill    Cyanocobalamin 1000 MCG/ML kit Inject  as directed Every 28 (Twenty-Eight) Days.      atorvastatin (LIPITOR) 10 MG tablet Take 1 tablet by mouth Every Night for 270 days. 90 tablet 2    lisinopril (PRINIVIL,ZESTRIL) 10 MG tablet Take 1 tablet by mouth Every Night for 270 days. 90 tablet 2     Facility-Administered Medications Prior to Visit   Medication Dose Route Frequency Provider Last Rate Last Admin    cyanocobalamin injection 1,000 mcg  1,000 mcg Intramuscular Q28 Days Franck Moreno MD   1,000 mcg at 07/30/24 1059     No opioid medication identified on active medication list. I have reviewed chart for other potential  high risk medication/s and harmful drug interactions in the elderly.      Aspirin is not on active medication list.  Aspirin use is not indicated based on review of current medical condition/s. Risk of harm outweighs potential benefits.  .    Patient Active Problem List   Diagnosis    Other hyperlipidemia    Essential hypertension    Elevated TSH    Nocturia    History of colon cancer    History of poliomyelitis    Other dietary vitamin B12 deficiency anemia    Arthralgia of  "knee, right    Acquired hypothyroidism     Advance Care Planning Advance Directive is on file.  ACP discussion was held with the patient during this visit. Patient has an advance directive in EMR which is still valid.             Objective   Vitals:    25 0947   BP: 124/66   Pulse: 54   SpO2: 98%   Weight: 75.8 kg (167 lb)   Height: 175.3 cm (69\")   PainSc: 0-No pain       Estimated body mass index is 24.66 kg/m² as calculated from the following:    Height as of this encounter: 175.3 cm (69\").    Weight as of this encounter: 75.8 kg (167 lb).    BMI is within normal parameters. No other follow-up for BMI required.           Does the patient have evidence of cognitive impairment? No  Lab Results   Component Value Date    CHLPL 150 2025    TRIG 109 2025    HDL 47 2025    LDL 83 2025    VLDL 20 2025                                                                                                Health  Risk Assessment    Smoking Status:  Social History     Tobacco Use   Smoking Status Former    Current packs/day: 0.00    Types: Cigarettes    Start date:     Quit date: 1970    Years since quittin.3   Smokeless Tobacco Never   Tobacco Comments    quit age 35     Alcohol Consumption:  Social History     Substance and Sexual Activity   Alcohol Use Yes    Comment: RARELY       Fall Risk Screen  STEADI Fall Risk Assessment was completed, and patient is at LOW risk for falls.Assessment completed on:2025    Depression Screening   Little interest or pleasure in doing things? Not at all   Feeling down, depressed, or hopeless? Not at all   PHQ-2 Total Score 0      Health Habits and Functional and Cognitive Screenin/16/2025    10:05 AM   Functional & Cognitive Status   Do you have difficulty preparing food and eating? No   Do you have difficulty bathing yourself, getting dressed or grooming yourself? No   Do you have difficulty using the toilet? No   Do you have difficulty " moving around from place to place? No   Do you have trouble with steps or getting out of a bed or a chair? No   Current Diet Well Balanced Diet   Dental Exam Up to date   Eye Exam Up to date   Exercise (times per week) 0 times per week   Current Exercises Include No Regular Exercise   Do you need help using the phone?  No   Are you deaf or do you have serious difficulty hearing?  No   Do you need help to go to places out of walking distance? No   Do you need help shopping? No   Do you need help preparing meals?  No   Do you need help with housework?  No   Do you need help with laundry? No   Do you need help taking your medications? No   Do you need help managing money? No   Do you ever drive or ride in a car without wearing a seat belt? No   Have you felt unusual stress, anger or loneliness in the last month? No   Who do you live with? Spouse   If you need help, do you have trouble finding someone available to you? No   Have you been bothered in the last four weeks by sexual problems? No   Do you have difficulty concentrating, remembering or making decisions? No           Age-appropriate Screening Schedule:  Refer to the list below for future screening recommendations based on patient's age, sex and/or medical conditions. Orders for these recommended tests are listed in the plan section. The patient has been provided with a written plan.    Health Maintenance List  Health Maintenance   Topic Date Due    COVID-19 Vaccine (9 - 2024-25 season) 04/01/2025    ANNUAL WELLNESS VISIT  04/15/2025    INFLUENZA VACCINE  07/01/2025    LIPID PANEL  04/09/2026    TDAP/TD VACCINES (2 - Td or Tdap) 06/07/2032    RSV Vaccine - Adults  Completed    Pneumococcal Vaccine 50+  Completed    ZOSTER VACCINE  Completed                                                                                                                                                CMS Preventative Services Quick Reference  Risk Factors Identified During  Encounter  Immunizations Discussed/Encouraged: COVID19  Inactivity/Sedentary: Patient was advised to exercise at least 150 minutes a week per CDC recommendations.    The above risks/problems have been discussed with the patient.  Pertinent information has been shared with the patient in the After Visit Summary.  An After Visit Summary and PPPS were made available to the patient.    Follow Up:   Next Medicare Wellness visit to be scheduled in 1 year.         Additional E&M Note during same encounter follows:  Patient has additional, significant, and separately identifiable condition(s)/problem(s) that require work above and beyond the Medicare Wellness Visit     Chief Complaint  Medicare Wellness-subsequent    Subjective    HPI  Gab is also being seen today for an annual adult preventative physical exam.  and Gab is also being seen today for additional medical problem/s.       The patient presents for a Medicare wellness visit.    The chief complaint includes knee pain and instability. He reports no adverse effects from his current medication regimen, which includes lisinopril. Monthly B12 injections are administered, with the most recent shot given last month. Scheduled appointments for B12 injections are maintained. Despite expressing uncertainty about the efficacy of these injections, B12 levels have improved from 214 to 597 over the past 10 months, and anemia has been corrected.    Lab results indicate normal sodium, potassium, chloride, calcium, glucose, and protein levels. The BUN is slightly elevated at 29, suggesting mild kidney dysfunction potentially due to inadequate water intake. The EGFR is 55. Thyroid function tests reveal a TSH level of 7.53, indicating early thyroid malfunction. He experiences dry skin during the winter months and agrees to try Euthyrox for hypothyroidism.    Physical activity is limited due to knee pain, which causes occasional instability and swelling. A brace provides some  "relief. This issue first manifested 4 years ago, at which time he also used a brace, and symptoms resolved with the onset of summer. 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    No hospitalizations have occurred in the past year. He plans to receive the COVID-19 vaccine in the fall. The living will remains unchanged.          Objective   Vital Signs:  /66   Pulse 54   Ht 175.3 cm (69\")   Wt 75.8 kg (167 lb)   SpO2 98%   BMI 24.66 kg/m²   Physical Exam  Constitutional:       Appearance: Normal appearance. He is well-developed.   HENT:      Head: Normocephalic.      Right Ear: Hearing, tympanic membrane and external ear normal.      Left Ear: Hearing, tympanic membrane and external ear normal.   Eyes:      General: Lids are normal.      Conjunctiva/sclera: Conjunctivae normal.      Pupils: Pupils are equal, round, and reactive to light.      Funduscopic exam:     Right eye: No AV nicking or papilledema.         Left eye: No AV nicking or papilledema.   Neck:      Thyroid: No thyroid mass or thyromegaly.      Vascular: No carotid bruit or JVD.      Trachea: Trachea normal.   Cardiovascular:      Rate and Rhythm: Normal rate and regular rhythm.      Pulses: Normal pulses.      Heart sounds: Normal heart sounds. No murmur heard.  Pulmonary:      Effort: Pulmonary effort is normal.      Breath sounds: Normal breath sounds.   Abdominal:      General: Bowel sounds are normal.      Palpations: Abdomen is soft.      Tenderness: There is no abdominal tenderness.   Musculoskeletal:         General: Normal range of motion.      Cervical back: Normal range of motion and neck supple.      Lumbar back: No bony tenderness.   Lymphadenopathy:      Cervical: No cervical adenopathy.      Upper Body:      Right upper body: No supraclavicular adenopathy.      Left upper body: No supraclavicular adenopathy.   Skin:     General: Skin is warm and dry.      Findings: No rash.      " Nails: There is no clubbing.   Neurological:      Mental Status: He is alert and oriented to person, place, and time.      Cranial Nerves: No cranial nerve deficit.      Deep Tendon Reflexes:      Reflex Scores:       Patellar reflexes are 2+ on the right side and 2+ on the left side.  Psychiatric:         Speech: Speech normal.         Behavior: Behavior normal.         Thought Content: Thought content normal.         Judgment: Judgment normal.          The following data was reviewed by: Franck Moreno MD on 04/16/2025:        Results  - Labs:    - B12 level: 597    - Sodium: 140    - Potassium: 5    - Chloride: 104    - Calcium: 9.5    - BUN: 29    - EGFR: 55    - Glucose: 96    - TSH: 7.53    - Total cholesterol: 150    - HDL: 47    - LDL: 83    - Triglycerides: 109    - Methylmalonic acid: 186    - Hemoglobin: 13.8           Assessment and Plan Additional age appropriate preventative wellness advice topics were discussed during today's preventative wellness exam(some topics already addressed during AWV portion of the note above):    Physical Activity: Advised cardiovascular activity 150 minutes per week as tolerated. (example brisk walk for 30 minutes, 5 days a week).     Nutrition: Discussed nutrition plan with patient. Information shared in after visit summary. Goal is for a well balanced diet to enhance overall health.     Motor Vehicle Safety Discussion:  Wearing Seatbelt While in Motor Vehicle recommendation. Adhering to posted speed limit recommendation.  Assessment & Plan  1. Acquired hypothyroidism.  - Presents with symptoms of dry skin and fatigue, indicative of early thyroid dysfunction.  - Trial of Euthyrox initiated to assess efficacy in managing symptoms.  - Advised to take medication once daily in the morning, 30 minutes before breakfast.  - Laboratory tests to be conducted in 6 months to monitor response to treatment.    2. Knee arthralgia.  - Reports knee pain, particularly when walking,  alleviated somewhat by using a brace.  - Pain localized to the medial aspect of the right knee, with some crepitus noted.  - Advised to continue using the knee brace and monitor symptoms over the summer.  - If condition persists in 6 months, an x-ray will be performed in the office. No new medications prescribed today.    3. Essential hypertension.  - Blood pressure well-controlled at 124/66 mmHg on lisinopril.  - No reported side effects from the medication.  - Advised to continue current regimen of lisinopril.    4. Vitamin B12 deficiency.  - B12 levels improved significantly from 214 to 597 with monthly B12 injections.  - Advised to continue monthly B12 injections to maintain adequate levels and prevent symptoms such as fatigue and neurological issues.    5. Lipid management.  - Lipid profile stable with total cholesterol at 150, HDL at 47, LDL at 83, and triglycerides at 109.  - Currently taking atorvastatin 10 mg.  - Advised to continue current lipid-lowering therapy.    6. Anemia.  - Anemia improved with hemoglobin levels increasing from 12.8 to 13.8, likely due to correction of B12 deficiency.  - No changes in management necessary at this time.    7. Health maintenance.  - Blood glucose level improved at 96 compared to last time.  - Kidney function looks good overall with slight dysfunction probably normal for age.  - Methylmalonic acid level improved from 833 a year ago to normal range at 186.  - Liver function tests are normal.  - Up to date on vaccinations except for COVID-19 vaccine planned for the fall.  - Advised to increase water intake to address slightly elevated BUN levels and support kidney function.  - Advised to avoid NSAIDs like Aleve or ibuprofen and use Tylenol if needed.  - PSA test to be conducted during next visit in 6 months.        Medicare annual wellness visit, subsequent  More exercise recommended. Covid vaccine due.        Encounter for well adult exam with abnormal  findings  Additional recommendations as above.       Other hyperlipidemia     The current medical regimen is effective;  continue present plan and medications.    Orders:    atorvastatin (LIPITOR) 10 MG tablet; Take 1 tablet by mouth Every Night for 270 days.    Lipid Panel With LDL / HDL Ratio; Future    CK; Future    Essential hypertension    The current medical regimen is effective;  continue present plan and medications.    Orders:    lisinopril (PRINIVIL,ZESTRIL) 10 MG tablet; Take 1 tablet by mouth Every Night for 270 days.    Comprehensive Metabolic Panel; Future    CBC & Differential; Future    Acquired hypothyroidism  Newly identified, start Euthyrox 50 mcg am. Recheck labs in 6 months.   Orders:    Euthyrox 50 MCG tablet; Take 1 tablet by mouth Every Morning for 270 days.    TSH; Future    Arthralgia of knee, right  Wear brace, exercises discussed, xray in 6 months if not improved       Other dietary vitamin B12 deficiency anemia  The current medical regimen is effective;  continue present plan and medications.    Orders:    CBC & Differential; Future    Vitamin B12; Future    Methylmalonic Acid, Serum; Future    Folate; Future    Nocturia  Nocturia symptoms are stable. PSA will be monitored with annual labs.     Orders:    PSA DIAGNOSTIC; Future              Follow Up   Return in about 6 months (around 10/16/2025) for recheck/refill medication.  Patient was given instructions and counseling regarding his condition or for health maintenance advice. Please see specific information pulled into the AVS if appropriate.  Patient or patient representative verbalized consent for the use of Ambient Listening during the visit with  Franck Moreno MD for chart documentation. 4/16/2025  11:19 EDT

## 2025-04-16 NOTE — ASSESSMENT & PLAN NOTE
The current medical regimen is effective;  continue present plan and medications.    Orders:    lisinopril (PRINIVIL,ZESTRIL) 10 MG tablet; Take 1 tablet by mouth Every Night for 270 days.    Comprehensive Metabolic Panel; Future    CBC & Differential; Future

## 2025-04-16 NOTE — PATIENT INSTRUCTIONS
You are due for a Covid 19 vaccination. (provides protection against Covid 19 Viral Infection) Please  talk to your pharmacist and get the immunization at your local pharmacy at your earliest convenience. Please click on the link for more information about this vaccine.   https://www.cdc.gov/coronavirus/2019-ncov/vaccines/stay-up-to-date.html  Exercising to Stay Healthy  To become healthy and stay healthy, it is recommended that you do moderate-intensity and vigorous-intensity exercise. You can tell that you are exercising at a moderate intensity if your heart starts beating faster and you start breathing faster but can still hold a conversation. You can tell that you are exercising at a vigorous intensity if you are breathing much harder and faster and cannot hold a conversation while exercising.  How can exercise benefit me?  Exercising regularly is important. It has many health benefits, such as:  Improving overall fitness, flexibility, and endurance.  Increasing bone density.  Helping with weight control.  Decreasing body fat.  Increasing muscle strength and endurance.  Reducing stress and tension, anxiety, depression, or anger.  Improving overall health.  What guidelines should I follow while exercising?  Before you start a new exercise program, talk with your health care provider.  Do not exercise so much that you hurt yourself, feel dizzy, or get very short of breath.  Wear comfortable clothes and wear shoes with good support.  Drink plenty of water while you exercise to prevent dehydration or heat stroke.  Work out until your breathing and your heartbeat get faster (moderate intensity).  How often should I exercise?  Choose an activity that you enjoy, and set realistic goals. Your health care provider can help you make an activity plan that is individually designed and works best for you.  Exercise regularly as told by your health care provider. This may include:  Doing strength training two times a week,  such as:  Lifting weights.  Using resistance bands.  Push-ups.  Sit-ups.  Yoga.  Doing a certain intensity of exercise for a given amount of time. Choose from these options:  A total of 150 minutes of moderate-intensity exercise every week.  A total of 75 minutes of vigorous-intensity exercise every week.  A mix of moderate-intensity and vigorous-intensity exercise every week.  Children, pregnant women, people who have not exercised regularly, people who are overweight, and older adults may need to talk with a health care provider about what activities are safe to perform. If you have a medical condition, be sure to talk with your health care provider before you start a new exercise program.  What are some exercise ideas?  Moderate-intensity exercise ideas include:  Walking 1 mile (1.6 km) in about 15 minutes.  Biking.  Hiking.  Golfing.  Dancing.  Water aerobics.  Vigorous-intensity exercise ideas include:  Walking 4.5 miles (7.2 km) or more in about 1 hour.  Jogging or running 5 miles (8 km) in about 1 hour.  Biking 10 miles (16.1 km) or more in about 1 hour.  Lap swimming.  Roller-skating or in-line skating.  Cross-country skiing.  Vigorous competitive sports, such as football, basketball, and soccer.  Jumping rope.  Aerobic dancing.  What are some everyday activities that can help me get exercise?  Yard work, such as:  Pushing a .  Raking and bagging leaves.  Washing your car.  Pushing a stroller.  Shoveling snow.  Gardening.  Washing windows or floors.  How can I be more active in my day-to-day activities?  Use stairs instead of an elevator.  Take a walk during your lunch break.  If you drive, park your car farther away from your work or school.  If you take public transportation, get off one stop early and walk the rest of the way.  Stand up or walk around during all of your indoor phone calls.  Get up, stretch, and walk around every 30 minutes throughout the day.  Enjoy exercise with a friend.  Support to continue exercising will help you keep a regular routine of activity.  Where to find more information  You can find more information about exercising to stay healthy from:  [place] Department of Health and Human Services: www.hhs.gov  Centers for Disease Control and Prevention (CDC): www.cdc.gov  Summary  Exercising regularly is important. It will improve your overall fitness, flexibility, and endurance.  Regular exercise will also improve your overall health. It can help you control your weight, reduce stress, and improve your bone density.  Do not exercise so much that you hurt yourself, feel dizzy, or get very short of breath.  Before you start a new exercise program, talk with your health care provider.  This information is not intended to replace advice given to you by your health care provider. Make sure you discuss any questions you have with your health care provider.  Document Revised: 04/15/2022 Document Reviewed: 04/15/2022  [employer] Patient Education ©  [employer]      Medicare Wellness  Personal Prevention Plan of Service     Date of Office Visit:    Encounter Provider:  Franck Moreno MD  Place of Service:  [facility]  Patient Name: Gab Maloney  :  1935    As part of the Medicare Wellness portion of your visit today, we are providing you with this personalized preventive plan of services (PPPS). This plan is based upon recommendations of the [place] Preventive Services Task Force (USPSTF) and the Advisory Committee on Immunization Practices (ACIP).    This lists the preventive care services that should be considered, and provides dates of when you are due. Items listed as completed are up-to-date and do not require any further intervention.    Health Maintenance   Topic Date Due    COVID-19 Vaccine ( season) 2025    ANNUAL WELLNESS VISIT  04/15/2025    INFLUENZA VACCINE  2025    LIPID PANEL  2026    TDAP/TD  VACCINES (2 - Td or Tdap) 06/07/2032    RSV Vaccine - Adults  Completed    Pneumococcal Vaccine 50+  Completed    ZOSTER VACCINE  Completed       Orders Placed This Encounter   Procedures    Comprehensive Metabolic Panel     Standing Status:   Future     Expected Date:   9/13/2025     Expiration Date:   4/16/2026     Release to patient:   Routine Release [[id number]]    Lipid Panel With LDL / HDL Ratio     Standing Status:   Future     Expected Date:   9/13/2025     Expiration Date:   4/16/2026     Release to patient:   Routine Release [[id number]]    CK     Standing Status:   Future     Expected Date:   9/13/2025     Expiration Date:   4/16/2026     Release to patient:   Routine Release [[id number]]    TSH     Standing Status:   Future     Expected Date:   9/13/2025     Expiration Date:   4/16/2026     Release to patient:   Routine Release [[id number]]    PSA DIAGNOSTIC     Standing Status:   Future     Expected Date:   9/13/2025     Expiration Date:   4/16/2026     Release to patient:   Routine Release [[id number]]    Vitamin B12     Standing Status:   Future     Expected Date:   9/13/2025     Expiration Date:   4/16/2026     Release to patient:   Routine Release [[id number]]    Methylmalonic Acid, Serum     Standing Status:   Future     Expected Date:   9/13/2025     Expiration Date:   4/16/2026     Release to patient:   Routine Release [[id number]]    Folate     Standing Status:   Future     Expected Date:   9/13/2025     Expiration Date:   4/16/2026     Release to patient:   Routine Release [[id number]]    CBC & Differential     Standing Status:   Future     Expected Date:   9/13/2025     Manual Differential:   No     Release to patient:   Routine Release [[id number]]       Return in about 6 months (around 10/16/2025) for recheck/refill medication.

## 2025-04-24 ENCOUNTER — HOSPITAL ENCOUNTER (OUTPATIENT)
Dept: INFUSION THERAPY | Facility: HOSPITAL | Age: OVER 89
Discharge: HOME | End: 2025-04-24
Admitting: FAMILY MEDICINE
Payer: MEDICARE

## 2025-04-24 VITALS
HEART RATE: 53 BPM | TEMPERATURE: 97.1 F | SYSTOLIC BLOOD PRESSURE: 127 MMHG | DIASTOLIC BLOOD PRESSURE: 58 MMHG | RESPIRATION RATE: 16 BRPM | OXYGEN SATURATION: 98 %

## 2025-04-24 DIAGNOSIS — D51.3 OTHER DIETARY VITAMIN B12 DEFICIENCY ANEMIA: Primary | ICD-10-CM

## 2025-04-24 PROCEDURE — 25010000002 CYANOCOBALAMIN PER 1000 MCG: Performed by: FAMILY MEDICINE

## 2025-04-24 PROCEDURE — 96372 THER/PROPH/DIAG INJ SC/IM: CPT

## 2025-04-24 RX ORDER — CYANOCOBALAMIN 1000 UG/ML
1000 INJECTION, SOLUTION INTRAMUSCULAR; SUBCUTANEOUS ONCE
Status: COMPLETED | OUTPATIENT
Start: 2025-04-24 | End: 2025-04-24

## 2025-04-24 RX ORDER — CYANOCOBALAMIN 1000 UG/ML
1000 INJECTION, SOLUTION INTRAMUSCULAR; SUBCUTANEOUS ONCE
OUTPATIENT
Start: 2025-05-22

## 2025-04-24 RX ADMIN — CYANOCOBALAMIN 1000 MCG: 1000 INJECTION, SOLUTION INTRAMUSCULAR; SUBCUTANEOUS at 12:30

## 2025-05-23 ENCOUNTER — HOSPITAL ENCOUNTER (OUTPATIENT)
Dept: INFUSION THERAPY | Facility: HOSPITAL | Age: OVER 89
Discharge: HOME | End: 2025-05-23
Payer: MEDICARE

## 2025-05-23 VITALS
TEMPERATURE: 96.4 F | RESPIRATION RATE: 16 BRPM | DIASTOLIC BLOOD PRESSURE: 71 MMHG | HEART RATE: 52 BPM | OXYGEN SATURATION: 100 % | SYSTOLIC BLOOD PRESSURE: 133 MMHG

## 2025-05-23 DIAGNOSIS — D51.3 OTHER DIETARY VITAMIN B12 DEFICIENCY ANEMIA: Primary | ICD-10-CM

## 2025-05-23 PROCEDURE — 25010000002 CYANOCOBALAMIN PER 1000 MCG: Performed by: FAMILY MEDICINE

## 2025-05-23 PROCEDURE — 96372 THER/PROPH/DIAG INJ SC/IM: CPT

## 2025-05-23 RX ORDER — CYANOCOBALAMIN 1000 UG/ML
1000 INJECTION, SOLUTION INTRAMUSCULAR; SUBCUTANEOUS ONCE
OUTPATIENT
Start: 2025-06-19

## 2025-05-23 RX ORDER — CYANOCOBALAMIN 1000 UG/ML
1000 INJECTION, SOLUTION INTRAMUSCULAR; SUBCUTANEOUS ONCE
Status: COMPLETED | OUTPATIENT
Start: 2025-05-23 | End: 2025-05-23

## 2025-05-23 RX ADMIN — CYANOCOBALAMIN 1000 MCG: 1000 INJECTION, SOLUTION INTRAMUSCULAR; SUBCUTANEOUS at 10:55

## 2025-06-20 ENCOUNTER — HOSPITAL ENCOUNTER (OUTPATIENT)
Dept: INFUSION THERAPY | Facility: HOSPITAL | Age: OVER 89
Discharge: HOME | End: 2025-06-20
Payer: MEDICARE

## 2025-06-20 VITALS
RESPIRATION RATE: 17 BRPM | HEART RATE: 64 BPM | OXYGEN SATURATION: 98 % | SYSTOLIC BLOOD PRESSURE: 141 MMHG | DIASTOLIC BLOOD PRESSURE: 70 MMHG | TEMPERATURE: 97.7 F

## 2025-06-20 DIAGNOSIS — D51.3 OTHER DIETARY VITAMIN B12 DEFICIENCY ANEMIA: Primary | ICD-10-CM

## 2025-06-20 PROCEDURE — 96372 THER/PROPH/DIAG INJ SC/IM: CPT

## 2025-06-20 PROCEDURE — 25010000002 CYANOCOBALAMIN PER 1000 MCG: Performed by: FAMILY MEDICINE

## 2025-06-20 RX ORDER — CYANOCOBALAMIN 1000 UG/ML
1000 INJECTION, SOLUTION INTRAMUSCULAR; SUBCUTANEOUS ONCE
Status: DISCONTINUED | OUTPATIENT
Start: 2025-06-20 | End: 2025-06-22 | Stop reason: HOSPADM

## 2025-06-20 RX ORDER — CYANOCOBALAMIN 1000 UG/ML
1000 INJECTION, SOLUTION INTRAMUSCULAR; SUBCUTANEOUS ONCE
OUTPATIENT
Start: 2025-07-18

## 2025-06-20 RX ADMIN — CYANOCOBALAMIN 1000 MCG: 1000 INJECTION, SOLUTION INTRAMUSCULAR; SUBCUTANEOUS at 10:39

## 2025-07-15 ENCOUNTER — TELEPHONE (OUTPATIENT)
Dept: FAMILY MEDICINE CLINIC | Facility: CLINIC | Age: OVER 89
End: 2025-07-15
Payer: MEDICARE

## 2025-07-15 DIAGNOSIS — E03.9 ACQUIRED HYPOTHYROIDISM: Primary | ICD-10-CM

## 2025-07-15 NOTE — TELEPHONE ENCOUNTER
Caller: [facility] - [place], [place] - [address] [facility] - [phone] [facility] [phone] FX    Relationship: Pharmacy    Best call back number: [phone]  DIRECT LINE TO PHARMACIST      What medication are you requesting: WILL NEED A SUBSTITUTE FOR   Euthyrox 50 MCG tablet  50 mcg, Every Early Morning     MANUFACTURERS DISCONTINUED        Have you had these symptoms before:    [x] Yes  [] No    Have you been treated for these symptoms before:   [x] Yes  [] No    If a prescription is needed, what is your preferred pharmacy and phone number: [facility] - [place], [place] - [address] [facility] - [phone] [facility] [phone] FX     Additional notes:

## 2025-07-16 RX ORDER — LEVOTHYROXINE SODIUM 50 MCG
50 TABLET ORAL
Qty: 90 TABLET | Refills: 0 | Status: SHIPPED | OUTPATIENT
Start: 2025-07-16 | End: 2025-10-14

## 2025-07-18 ENCOUNTER — HOSPITAL ENCOUNTER (OUTPATIENT)
Dept: INFUSION THERAPY | Facility: HOSPITAL | Age: OVER 89
Discharge: HOME | End: 2025-07-18
Payer: MEDICARE

## 2025-07-18 VITALS
TEMPERATURE: 97 F | OXYGEN SATURATION: 99 % | RESPIRATION RATE: 18 BRPM | SYSTOLIC BLOOD PRESSURE: 125 MMHG | DIASTOLIC BLOOD PRESSURE: 60 MMHG | HEART RATE: 52 BPM

## 2025-07-18 DIAGNOSIS — D51.3 OTHER DIETARY VITAMIN B12 DEFICIENCY ANEMIA: Primary | ICD-10-CM

## 2025-07-18 PROCEDURE — 25010000002 CYANOCOBALAMIN PER 1000 MCG: Performed by: FAMILY MEDICINE

## 2025-07-18 PROCEDURE — 96372 THER/PROPH/DIAG INJ SC/IM: CPT

## 2025-07-18 RX ORDER — CYANOCOBALAMIN 1000 UG/ML
1000 INJECTION, SOLUTION INTRAMUSCULAR; SUBCUTANEOUS ONCE
Status: COMPLETED | OUTPATIENT
Start: 2025-07-18 | End: 2025-07-18

## 2025-07-18 RX ORDER — CYANOCOBALAMIN 1000 UG/ML
1000 INJECTION, SOLUTION INTRAMUSCULAR; SUBCUTANEOUS ONCE
OUTPATIENT
Start: 2025-08-15

## 2025-07-18 RX ADMIN — CYANOCOBALAMIN 1000 MCG: 1000 INJECTION, SOLUTION INTRAMUSCULAR; SUBCUTANEOUS at 10:56

## 2025-07-22 ENCOUNTER — TELEPHONE (OUTPATIENT)
Dept: FAMILY MEDICINE CLINIC | Facility: CLINIC | Age: OVER 89
End: 2025-07-22
Payer: MEDICARE

## 2025-07-22 NOTE — TELEPHONE ENCOUNTER
Patient called into the office stating that his medication was changed to Synthroid 50 mcg which is a Tier 3 medication. He requesting Euthyrox 50 mcg to be sent back to his pharmacy due to it being tier 2 medication. Patient is requesting to know why his medication was changed.

## 2025-08-22 ENCOUNTER — HOSPITAL ENCOUNTER (OUTPATIENT)
Dept: INFUSION THERAPY | Facility: HOSPITAL | Age: OVER 89
Discharge: HOME | End: 2025-08-22
Payer: MEDICARE

## (undated) DEVICE — UNDYED BRAIDED (POLYGLACTIN 910), SYNTHETIC ABSORBABLE SUTURE: Brand: COATED VICRYL

## (undated) DEVICE — TOOL MR8-15BA50T MR8 15CM BAL SYMTRI 5MM: Brand: MIDAS REX MR8

## (undated) DEVICE — ANTIBACTERIAL UNDYED BRAIDED (POLYGLACTIN 910), SYNTHETIC ABSORBABLE SUTURE: Brand: COATED VICRYL

## (undated) DEVICE — SYR CONTRL PRESS/LO FIX/M/LL W/THMB/RNG 10ML

## (undated) DEVICE — CONN TBG Y 5 IN 1 LF STRL

## (undated) DEVICE — GLV SURG SENSICARE W/ALOE PF LF 7.5 STRL

## (undated) DEVICE — NEEDLE, QUINCKE, 20GX3.5": Brand: MEDLINE

## (undated) DEVICE — GLV SURG BIOGEL LTX PF 7

## (undated) DEVICE — TUBING, SUCTION, 1/4" X 10', STRAIGHT: Brand: MEDLINE

## (undated) DEVICE — SPONGE,NEURO,.75"X.75",XR,STRL,LF,10/PK: Brand: MEDLINE

## (undated) DEVICE — DRP MICROSCOPE 4 BINOCULAR CV 54X150IN

## (undated) DEVICE — SPNG GZ WOVN 4X4IN 12PLY 10/BX STRL

## (undated) DEVICE — PK NEURO SPINE 40

## (undated) DEVICE — SINGLE-USE BIOPSY FORCEPS: Brand: RADIAL JAW 4

## (undated) DEVICE — APPL CHLORAPREP HI/LITE 26ML ORNG

## (undated) DEVICE — THE TORRENT IRRIGATION SCOPE CONNECTOR IS USED WITH THE TORRENT IRRIGATION TUBING TO PROVIDE IRRIGATION FLUIDS SUCH AS STERILE WATER DURING GASTROINTESTINAL ENDOSCOPIC PROCEDURES WHEN USED IN CONJUNCTION WITH AN IRRIGATION PUMP (OR ELECTROSURGICAL UNIT).: Brand: TORRENT

## (undated) DEVICE — CANN NASL CO2 TRULINK W/O2 A/

## (undated) DEVICE — Device: Brand: DEFENDO AIR/WATER/SUCTION AND BIOPSY VALVE

## (undated) DEVICE — SOL ISO/ALC RUB 70PCT 4OZ

## (undated) DEVICE — 3M™ STERI-STRIP™ ANTIMICROBIAL SKIN CLOSURES 1/2 INCH X 4 INCHES 50/CARTON 4 CARTONS/CASE A1847: Brand: 3M™ STERI-STRIP™

## (undated) DEVICE — ADHS LIQ MASTISOL 2/3ML

## (undated) DEVICE — SOL NACL 0.9PCT 100ML SGL

## (undated) DEVICE — SMOKE EVACUATION TUBING WITH 7/8 IN TO 1/4 IN REDUCER: Brand: BUFFALO FILTER

## (undated) DEVICE — PATIENT RETURN ELECTRODE, SINGLE-USE, CONTACT QUALITY MONITORING, ADULT, WITH 9FT CORD, FOR PATIENTS WEIGING OVER 33LBS. (15KG): Brand: MEGADYNE

## (undated) DEVICE — PENCL ES MEGADINE EZ/CLEAN BUTN W/HOLSTR 10FT

## (undated) DEVICE — Device

## (undated) DEVICE — INTENDED FOR TISSUE SEPARATION, AND OTHER PROCEDURES THAT REQUIRE A SHARP SURGICAL BLADE TO PUNCTURE OR CUT.: Brand: BARD-PARKER ® STAINLESS STEEL BLADES